# Patient Record
Sex: FEMALE | Race: BLACK OR AFRICAN AMERICAN | NOT HISPANIC OR LATINO | Employment: OTHER | URBAN - METROPOLITAN AREA
[De-identification: names, ages, dates, MRNs, and addresses within clinical notes are randomized per-mention and may not be internally consistent; named-entity substitution may affect disease eponyms.]

---

## 2019-01-02 ENCOUNTER — TRANSFERRED RECORDS (OUTPATIENT)
Dept: HEALTH INFORMATION MANAGEMENT | Facility: CLINIC | Age: 24
End: 2019-01-02

## 2019-01-02 LAB — INR PPP: 0.8 (ref 0.8–1.2)

## 2019-01-04 LAB
ALT SERPL-CCNC: 22 U/L (ref 6–55)
AST SERPL-CCNC: 48 U/L (ref 5–34)
CREAT SERPL-MCNC: 0.56 MG/DL (ref 0.57–1.11)
GFR SERPL CREATININE-BSD FRML MDRD: >60 ML/MIN/1.73M2
GLUCOSE SERPL-MCNC: 96 MG/DL (ref 65–99)
POTASSIUM SERPL-SCNC: 4.3 MMOL/L (ref 3.6–5.3)

## 2019-07-19 ENCOUNTER — OFFICE VISIT (OUTPATIENT)
Dept: URGENT CARE | Facility: URGENT CARE | Age: 24
End: 2019-07-19
Payer: MEDICAID

## 2019-07-19 VITALS
WEIGHT: 178 LBS | HEART RATE: 72 BPM | SYSTOLIC BLOOD PRESSURE: 90 MMHG | OXYGEN SATURATION: 99 % | RESPIRATION RATE: 16 BRPM | TEMPERATURE: 98.2 F | DIASTOLIC BLOOD PRESSURE: 68 MMHG

## 2019-07-19 DIAGNOSIS — M54.41 ACUTE RIGHT-SIDED LOW BACK PAIN WITH RIGHT-SIDED SCIATICA: Primary | ICD-10-CM

## 2019-07-19 DIAGNOSIS — R25.3 MUSCLE TWITCHING: ICD-10-CM

## 2019-07-19 PROCEDURE — 99214 OFFICE O/P EST MOD 30 MIN: CPT | Performed by: FAMILY MEDICINE

## 2019-07-19 NOTE — PROGRESS NOTES
Chief Complaint   Patient presents with     Urgent Care     right sided back pain     SUBJECTIVE  HPI: Karen Loya is a 24 year old female who presents for evaluation of back pain  Symptoms began 1 day(s) ago, have been onset acute and are worse.  Pain is located in the low back right region, with radiation to radiates into the right buttocks and radiates into the right leg,  Recent injury:recent heavy lifting, [place]  was there to help with the visit  Personal hx of back pain is no prior back problems.  Pain is exacerbated by: changing position.  Pain is relieved by: rest[unfilled] sx include: none.  Red flag symptoms: negative.  Does have some associated twitching mostly in the lateral 3 toes of the right foot.    History reviewed. No pertinent past medical history.  No current outpatient medications on file.     Social History     Tobacco Use     Smoking status: Never Smoker     Smokeless tobacco: Never Used   Substance Use Topics     Alcohol use: Not on file       ROS:  10 point ROS of systems including Constitutional, Eyes, Respiratory, Cardiovascular, Gastroenterology, Genitourinary, Integumentary,Psychiatric were all negative except for pertinent positives noted in my HPI         OBJECTIVE:  BP 90/68   Pulse 72   Temp 98.2  F (36.8  C) (Oral)   Resp 16   Wt 80.7 kg (178 lb)   SpO2 99%   Back examination: Back symmetric, no curvature. ROM normal. No CVA tenderness.  [unfilled] leg raise test: negative  GENERAL APPEARANCE: healthy, alert and no distress  RESP: lungs clear to auscultation - no rales, rhonchi or wheezes  CV: regular rates and rhythm, normal S1 S2, no murmur noted  ABDOMEN:  soft, nontender, no HSM or masses and bowel sounds normal  NEURO: Normal strength and tone with no weakness or sensory deficit noted, reflexes normal   SKIN: no suspicious lesions or rashes    ASSESSMENT/IMPRESSION:  Karen was seen today for urgent care.    Diagnoses and all orders for this visit:    Acute  right-sided low back pain with right-sided sciatica    Muscle twitching          PLAN:1) PLEASE SEE ORDER SUMMARY  [unfilled]:      1.  Continue stretching and strengthening exercises.       2.  Continue prn heat or ice application.  Viewed with patient about the condition advised that she should avoid heavy lifting  Do some back stretching exercises  Discussed about trying ibuprofen 600 mg with food every 8 hours for next 2 to 3 days and definitely consider doing back stretching exercises can use heat or cold packs to help with the back pain patient understood and agreed with plan  Muscle twitching I did discuss with patient if continues to worsen should consider following up with neurologist    Julia Ramirez MD

## 2019-07-19 NOTE — PATIENT INSTRUCTIONS
Patient Education     Back Exercises: Back Press    Do this exercise on your hands and knees. Keep your knees under your hips and your hands under your shoulders. Keep your spine in a neutral position (not arched or sagging). Be sure to maintain your neck s natural curve:    Tighten your stomach and buttock muscles to press your back upward. Let your head drop slightly.    Hold for 5 seconds. Return to starting position.    Repeat 5 times.  Date Last Reviewed: 3/1/2018    2773-6000 [employer]. [address] [place] [address]. All rights reserved. This information is not intended as a substitute for professional medical care. Always follow your healthcare professional's instructions.           Patient Education     Back Exercises: Back Release  Do this exercise on your hands and knees. Keep your knees under your hips and your hands under your shoulders.        Relax your abdominal and buttocks muscles, lift your head, and let your back sag. Be sure to keep your weight evenly distributed. Don t sit back on your hips.     Hold for 5 seconds.    Return to starting position.    Tuck your head and lift (arch) your back.    Hold for 5 seconds    Return to starting position.    Repeat 5 times.  Date Last Reviewed: 3/1/2018    7587-2360 [employer]. [address] [place] [address]. All rights reserved. This information is not intended as a substitute for professional medical care. Always follow your healthcare professional's instructions.           Patient Education     Back Exercises: Hip Rotator Stretch    To start, lie on your back with your knees bent and feet flat on the floor. Don t press your neck or lower back to the floor. Breathe deeply. You should feel comfortable and relaxed in this position.    Rest your right ankle on your left knee.    Place a towel behind your left thigh, and use it to pull the knee toward your chest. Feel the stretch in your  buttocks.    Hold for 30 to 60 seconds. Release.    Repeat 2 times.    Switch legs.     If there is any pain other than stretch in the knee or buttock, stop and contact your healthcare provider.  For your safety, check with your healthcare provider before starting an exercise program.  Date Last Reviewed: 3/1/2018    2164-8002 [employer]. [address] [place] [address]. All rights reserved. This information is not intended as a substitute for professional medical care. Always follow your healthcare professional's instructions.           Patient Education     Back Exercises: Leg Pull    To start, lie on your back with your knees bent and feet flat on the floor. Don t press your neck or lower back to the floor. Breathe deeply. You should feel comfortable and relaxed in this position.    Pull one knee to your chest.    Hold for 30 to 60 seconds. Return to starting position.    Repeat 2 times.    Switch legs.    For a double leg pull, pull both legs to your chest at the same time. Repeat 2 times.  For your safety, check with your healthcare provider before starting an exercise program.  Date Last Reviewed: 3/1/2018    7638-6130 [employer]. [address] [place] [address]. All rights reserved. This information is not intended as a substitute for professional medical care. Always follow your healthcare professional's instructions.

## 2019-08-14 ENCOUNTER — TELEPHONE (OUTPATIENT)
Dept: OBGYN | Facility: CLINIC | Age: 24
End: 2019-08-14

## 2019-08-14 NOTE — TELEPHONE ENCOUNTER
"Call received from  [phone]  LMP 6/15/19, has not taken a pregnancy test. When asked if she could be pregnant?  \"could be\"??  Started having some bleeding 8/9.  Had difficulty getting any information from pt or   Recommend pt to to ER for evaluation.  Dilma Silva RN      "

## 2019-10-31 ENCOUNTER — APPOINTMENT (OUTPATIENT)
Dept: CT IMAGING | Facility: CLINIC | Age: 24
End: 2019-10-31
Attending: NURSE PRACTITIONER
Payer: COMMERCIAL

## 2019-10-31 ENCOUNTER — HOSPITAL ENCOUNTER (EMERGENCY)
Facility: CLINIC | Age: 24
Discharge: HOME | End: 2019-10-31
Attending: EMERGENCY MEDICINE | Admitting: EMERGENCY MEDICINE
Payer: COMMERCIAL

## 2019-10-31 VITALS
SYSTOLIC BLOOD PRESSURE: 108 MMHG | RESPIRATION RATE: 18 BRPM | OXYGEN SATURATION: 100 % | TEMPERATURE: 98.4 F | HEART RATE: 88 BPM | DIASTOLIC BLOOD PRESSURE: 88 MMHG

## 2019-10-31 DIAGNOSIS — K43.9 ABDOMINAL WALL HERNIA: ICD-10-CM

## 2019-10-31 DIAGNOSIS — K59.00 CONSTIPATION: ICD-10-CM

## 2019-10-31 LAB
ALBUMIN UR-MCNC: NEGATIVE MG/DL
ANION GAP SERPL CALCULATED.3IONS-SCNC: 3 MMOL/L (ref 3–14)
APPEARANCE UR: CLEAR
B-HCG FREE SERPL-ACNC: <5 IU/L
BASOPHILS # BLD AUTO: 0 10E9/L (ref 0–0.2)
BASOPHILS NFR BLD AUTO: 0.3 %
BILIRUB UR QL STRIP: NEGATIVE
BUN SERPL-MCNC: 10 MG/DL (ref 7–30)
CALCIUM SERPL-MCNC: 9 MG/DL (ref 8.5–10.1)
CHLORIDE SERPL-SCNC: 108 MMOL/L (ref 94–109)
CO2 SERPL-SCNC: 26 MMOL/L (ref 20–32)
COLOR UR AUTO: ABNORMAL
CREAT SERPL-MCNC: 0.37 MG/DL (ref 0.52–1.04)
DIFFERENTIAL METHOD BLD: ABNORMAL
EOSINOPHIL # BLD AUTO: 0.2 10E9/L (ref 0–0.7)
EOSINOPHIL NFR BLD AUTO: 3.1 %
ERYTHROCYTE [DISTWIDTH] IN BLOOD BY AUTOMATED COUNT: 20.2 % (ref 10–15)
GFR SERPL CREATININE-BSD FRML MDRD: >90 ML/MIN/{1.73_M2}
GLUCOSE SERPL-MCNC: 102 MG/DL (ref 70–99)
GLUCOSE UR STRIP-MCNC: NEGATIVE MG/DL
HCT VFR BLD AUTO: 31.5 % (ref 35–47)
HGB BLD-MCNC: 10.1 G/DL (ref 11.7–15.7)
HGB UR QL STRIP: ABNORMAL
IMM GRANULOCYTES # BLD: 0 10E9/L (ref 0–0.4)
IMM GRANULOCYTES NFR BLD: 0.2 %
KETONES UR STRIP-MCNC: NEGATIVE MG/DL
LEUKOCYTE ESTERASE UR QL STRIP: NEGATIVE
LYMPHOCYTES # BLD AUTO: 1.3 10E9/L (ref 0.8–5.3)
LYMPHOCYTES NFR BLD AUTO: 21.9 %
MCH RBC QN AUTO: 23.1 PG (ref 26.5–33)
MCHC RBC AUTO-ENTMCNC: 32.1 G/DL (ref 31.5–36.5)
MCV RBC AUTO: 72 FL (ref 78–100)
MONOCYTES # BLD AUTO: 0.7 10E9/L (ref 0–1.3)
MONOCYTES NFR BLD AUTO: 11.8 %
NEUTROPHILS # BLD AUTO: 3.7 10E9/L (ref 1.6–8.3)
NEUTROPHILS NFR BLD AUTO: 62.7 %
NITRATE UR QL: NEGATIVE
NRBC # BLD AUTO: 0 10*3/UL
NRBC BLD AUTO-RTO: 0 /100
PH UR STRIP: 7 PH (ref 5–7)
PLATELET # BLD AUTO: 374 10E9/L (ref 150–450)
POTASSIUM SERPL-SCNC: 4.1 MMOL/L (ref 3.4–5.3)
RBC # BLD AUTO: 4.37 10E12/L (ref 3.8–5.2)
RBC #/AREA URNS AUTO: 1 /HPF (ref 0–2)
SODIUM SERPL-SCNC: 137 MMOL/L (ref 133–144)
SOURCE: ABNORMAL
SP GR UR STRIP: 1 (ref 1–1.03)
SQUAMOUS #/AREA URNS AUTO: <1 /HPF (ref 0–1)
UROBILINOGEN UR STRIP-MCNC: NORMAL MG/DL (ref 0–2)
WBC # BLD AUTO: 5.8 10E9/L (ref 4–11)
WBC #/AREA URNS AUTO: 1 /HPF (ref 0–5)

## 2019-10-31 PROCEDURE — 99285 EMERGENCY DEPT VISIT HI MDM: CPT | Mod: 25

## 2019-10-31 PROCEDURE — 96374 THER/PROPH/DIAG INJ IV PUSH: CPT

## 2019-10-31 PROCEDURE — 81001 URINALYSIS AUTO W/SCOPE: CPT | Performed by: NURSE PRACTITIONER

## 2019-10-31 PROCEDURE — 80048 BASIC METABOLIC PNL TOTAL CA: CPT | Performed by: EMERGENCY MEDICINE

## 2019-10-31 PROCEDURE — 84702 CHORIONIC GONADOTROPIN TEST: CPT

## 2019-10-31 PROCEDURE — 85025 COMPLETE CBC W/AUTO DIFF WBC: CPT | Performed by: NURSE PRACTITIONER

## 2019-10-31 PROCEDURE — 74176 CT ABD & PELVIS W/O CONTRAST: CPT

## 2019-10-31 PROCEDURE — 25000128 H RX IP 250 OP 636: Performed by: NURSE PRACTITIONER

## 2019-10-31 RX ORDER — KETOROLAC TROMETHAMINE 15 MG/ML
15 INJECTION, SOLUTION INTRAMUSCULAR; INTRAVENOUS ONCE
Status: COMPLETED | OUTPATIENT
Start: 2019-10-31 | End: 2019-10-31

## 2019-10-31 RX ADMIN — KETOROLAC TROMETHAMINE 15 MG: 15 INJECTION, SOLUTION INTRAMUSCULAR; INTRAVENOUS at 11:37

## 2019-10-31 SDOH — HEALTH STABILITY: MENTAL HEALTH: HOW OFTEN DO YOU HAVE A DRINK CONTAINING ALCOHOL?: NEVER

## 2019-10-31 ASSESSMENT — ENCOUNTER SYMPTOMS
FEVER: 0
ABDOMINAL PAIN: 1

## 2019-10-31 NOTE — ED NOTES
Bed: ED23  Expected date:   Expected time:   Means of arrival:   Comments:  beau - 514 - 24 F vag bleeding eta 1024

## 2019-10-31 NOTE — ED TRIAGE NOTES
Pt reports that she is having heavy vaginal bleeding for past two days. Reports also having back pain. Last menstruation one month ago.

## 2019-10-31 NOTE — ED AVS SNAPSHOT
Emergency Department  [address] [place] [id number]  Phone:  [phone]  Fax:  [phone]                                    Karen Loya   MRN: [id number]    Department:   Emergency Department   Date of Visit:  10/31/2019           After Visit Summary Signature Page    I have received my discharge instructions, and my questions have been answered. I have discussed any challenges I see with this plan with the nurse or doctor.    ..........................................................................................................................................  Patient/Patient Representative Signature      ..........................................................................................................................................  Patient Representative Print Name and Relationship to Patient    ..................................................               ................................................  Date                                   Time    ..........................................................................................................................................  Reviewed by Signature/Title    ...................................................              ..............................................  Date                                               Time          22EPIC Rev 08/18

## 2019-10-31 NOTE — ED PROVIDER NOTES
History     Chief Complaint:  Vaginal Bleeding    HPI   Karen Loya is a 24 year old female who presents with vaginal bleeding and right-sided abdominal pain. She states she has had right-sided abdominal pain since she gave birth in January 2019 which is worse when she lies on her side and bends over.  The patient reports on 10/29 she started to have vaginal bleeding and her abdominal pain has increased. She notes her last period was about a month ago, but states that this period feels different from her normal period as it is heavier than normal with more clots. She states she has not taken medication since she wanted to know what was happening with her pain. She denies history of kidney stones.    Allergies:  No Known Drug Allergies     Medications:    The patient is currently on no regular medications.     Past Medical History:    Preeclampsia in pregnancy     Past Surgical History:    History reviewed. No pertinent past surgical history.     Family History:    History reviewed. No pertinent family history.      Social History:  Recently moved from [place], [place] and went to [place]  Smoking Status: Never  Smokeless Tobacco: Never  Marital Status:       Review of Systems   Constitutional: Negative for chills and fever.   Respiratory: Negative for shortness of breath.    Cardiovascular: Negative for chest pain.   Gastrointestinal: Positive for abdominal pain. Negative for nausea and vomiting.   Genitourinary: Positive for vaginal bleeding.   Musculoskeletal: Negative for back pain.   All other systems reviewed and are negative.        Physical Exam     Patient Vitals for the past 24 hrs:   BP Temp Temp src Pulse Resp SpO2   10/31/19 1035 -- -- -- -- -- 100 %   10/31/19 1034 119/57 98.4  F (36.9  C) Oral 89 20 100 %        Physical Exam  General: Alert, Mild  discomfort, well kept  Eyes: PERRL, conjunctivae pink no scleral icterus or conjunctival injection  ENT:   Moist mucus membranes, posterior  oropharynx clear without erythema or exudates, No lymphadenopathy, Normal voice  Resp:  Lungs clear to auscultation bilaterally, no crackles/rubs/wheezes. Good air movement  CV:  Normal rate and rhythm, no murmurs/rubs/gallops  GI:  Abdomen soft and non-distended.  Normoactive BS. Mild LLQ/Left abd tenderness, No guarding or rebound, No masses  Skin:  Warm, dry.  No rashes or petechiae  Musculoskeletal: No peripheral edema or calf tenderness, Normal gross ROM   Neuro: Alert and oriented to person/place/time, normal sensation  Psychiatric: Normal affect, cooperative, good eye contact    Emergency Department Course   Imaging:  Radiology findings were communicated with the patient who voiced understanding of the findings.    Abd/pelvis CT no contrast - Stone Protocol  1. Calcifications and/or nonobstructing stones in the left kidney.  2. Large amount of stool in the distal colon.  3. Anterior abdominal wall hernia as above.  As read by Radiology.    Laboratory:  Laboratory findings were communicated with the patient who voiced understanding of the findings.    CBC: HGB 10.1 (L) o/w WNL. (WBC 5.8, )   BMP: Glucose 102 (H), Creatinine 0.37 (L) o/w WNL  ISTAT HCG Sergei: <5.0  UA: Moderate blood, o/w Negative    Interventions:  1137: Toradol 15 mg IV    Emergency Department Course:  Past medical records, nursing notes, and vitals reviewed.  1112: I performed an exam of the patient and obtained history, as documented above.  IV inserted and blood drawn.  The patient provided a urine sample here in the emergency department. This was sent for laboratory testing, findings above.    1422: I rechecked the patient. Explained findings to the patient. She was concerned about her urine and would like to try to give a urine sample.    Findings and plan explained to the Patient. Patient discharged home with instructions regarding supportive care, medications, and reasons to return. The importance of close follow-up was reviewed.      Impression & Plan   Medical Decision Making:  Karen Loya presents with abdominal pain.  The differential diagnosis is broad and includes:  Appendicitis, cholecystitis, peptic ulcer disease, diverticulitis, bowel obstruction, ischemia, pancreatitis, amongst others.  Based on clinical exam, laboratory testing, and imaging, exam is most consistent with abdominal wall hernia not incarcerated or strangulated and  constipation.  No evidence for further intra-abdominal catastrophe..  The pain has improved with interventions in the ED.  The exact etiology of the pain is not clear at this time.  She will be discharged, and was warned that persistent or worsening symptoms should prompt re-examination (ED if necessary) in 8-12 hours.    Discharge Diagnosis:    ICD-10-CM    1. Abdominal wall hernia K43.9 UA with Microscopic reflex to Culture   2. Constipation K59.00      Disposition:  discharged to home     Scribe Disclosure:  Gelacio LONG, am serving as a scribe at 11:12 AM on 10/31/2019 to document services personally performed by Pj Partida APRN CNP based on my observations and the provider's statements to me.       10/31/2019    EMERGENCY DEPARTMENT       Pj Partida APRN CNP  11/12/19 1016

## 2019-11-05 ENCOUNTER — OFFICE VISIT (OUTPATIENT)
Dept: URGENT CARE | Facility: URGENT CARE | Age: 24
End: 2019-11-05
Payer: COMMERCIAL

## 2019-11-05 VITALS
WEIGHT: 178 LBS | DIASTOLIC BLOOD PRESSURE: 67 MMHG | HEART RATE: 94 BPM | SYSTOLIC BLOOD PRESSURE: 115 MMHG | OXYGEN SATURATION: 98 % | TEMPERATURE: 99 F | RESPIRATION RATE: 16 BRPM

## 2019-11-05 DIAGNOSIS — R10.9 ABDOMINAL WALL PAIN: Primary | ICD-10-CM

## 2019-11-05 PROCEDURE — 99213 OFFICE O/P EST LOW 20 MIN: CPT | Performed by: PHYSICIAN ASSISTANT

## 2019-11-05 RX ORDER — IBUPROFEN 800 MG/1
800 TABLET, FILM COATED ORAL EVERY 8 HOURS PRN
Qty: 100 TABLET | Refills: 0 | Status: SHIPPED | OUTPATIENT
Start: 2019-11-05 | End: 2020-08-12

## 2019-11-05 NOTE — PATIENT INSTRUCTIONS
(R10.9) Abdominal wall pain  (primary encounter diagnosis)  Comment:   Plan: ibuprofen (ADVIL/MOTRIN) 800 MG tablet    Establish care with primary clinic.  Appointment made for patient.

## 2019-11-05 NOTE — PROGRESS NOTES
Patient presents with:  Abnormal Uterine Bleeding: Pt states she is having bleeding and abdominal bloating     SUBJECTIVE:   Karen Loya is a 24 year old female presenting with a chief complaint of   1) pain in her abdomen since she was pregnant.  Her son is now 10 months old    She was seen in the ED on 10/31/19 and diagnosed with an abdominal wall hernia and constipation.    Her symptoms are the same.      She does not have a primary clinic.     LMP 10/30/19.    SH: patient is here with her 10 month old son today     used today.    No past medical history on file.     Abdominal wall hernia    There is no problem list on file for this patient.    Social History     Tobacco Use     Smoking status: Never Smoker     Smokeless tobacco: Never Used   Substance Use Topics     Alcohol use: Never     Frequency: Never       ROS:  CONSTITUTIONAL:NEGATIVE for fever, chills, change in weight  INTEGUMENTARY/SKIN: NEGATIVE for worrisome rashes, moles or lesions  EYES: NEGATIVE for vision changes or irritation  ENT/MOUTH: NEGATIVE for ear, mouth and throat problems  RESP:NEGATIVE for significant cough or SOB  CV: NEGATIVE for chest pain, palpitations or peripheral edema  GI: NEGATIVE for nausea, abdominal pain, heartburn, or change in bowel habits  : normal menstrual cycles  MUSCULOSKELETAL: as per HPI  NEURO: NEGATIVE for weakness, dizziness or paresthesias  PSYCHIATRIC: NEGATIVE for changes in mood or affect    OBJECTIVE  :/67 (BP Location: Left arm, Patient Position: Sitting, Cuff Size: Adult Regular)   Pulse 94   Temp 99  F (37.2  C) (Oral)   Resp 16   Wt 80.7 kg (178 lb)   SpO2 98%   GENERAL APPEARANCE: healthy, alert and no distress  EYES: EOMI,  PERRL, conjunctiva clear  HENT: ear canals and TM's normal.  Nose and mouth without ulcers, erythema or lesions  NECK: supple, nontender, no lymphadenopathy  RESP: lungs clear to auscultation - no rales, rhonchi or wheezes  CV: regular rates and rhythm,  normal S1 S2, no murmur noted  ABDOMEN: soft.  Tenderness around umbilicus.    NEURO: Normal strength and tone, sensory exam grossly normal,  normal speech and mentation  SKIN: no suspicious lesions or rashes    (R10.9) Abdominal wall pain  (primary encounter diagnosis)  Comment: in patient recently seen for same chronic symptoms at ED on 10/31/19 and diagnosed with abdominal wall hernia  Plan: ibuprofen (ADVIL/MOTRIN) 800 MG tablet        appoitnment made for patient to establish care with primary clinic    Patient expresses understanding and agreement with the assessment and plan as above.

## 2019-11-12 ASSESSMENT — ENCOUNTER SYMPTOMS
BACK PAIN: 0
SHORTNESS OF BREATH: 0
VOMITING: 0
NAUSEA: 0
CHILLS: 0

## 2020-01-21 ENCOUNTER — OFFICE VISIT (OUTPATIENT)
Dept: URGENT CARE | Facility: URGENT CARE | Age: 25
End: 2020-01-21
Payer: COMMERCIAL

## 2020-01-21 VITALS
SYSTOLIC BLOOD PRESSURE: 113 MMHG | OXYGEN SATURATION: 99 % | DIASTOLIC BLOOD PRESSURE: 74 MMHG | HEART RATE: 83 BPM | TEMPERATURE: 99.5 F | RESPIRATION RATE: 17 BRPM

## 2020-01-21 DIAGNOSIS — N39.0 URINARY TRACT INFECTION WITH HEMATURIA, SITE UNSPECIFIED: ICD-10-CM

## 2020-01-21 DIAGNOSIS — R31.9 URINARY TRACT INFECTION WITH HEMATURIA, SITE UNSPECIFIED: ICD-10-CM

## 2020-01-21 DIAGNOSIS — R42 DIZZINESS: Primary | ICD-10-CM

## 2020-01-21 LAB
ALBUMIN UR-MCNC: NEGATIVE MG/DL
ANION GAP SERPL CALCULATED.3IONS-SCNC: 2 MMOL/L (ref 3–14)
APPEARANCE UR: CLEAR
BASOPHILS # BLD AUTO: 0 10E9/L (ref 0–0.2)
BASOPHILS NFR BLD AUTO: 0.4 %
BILIRUB UR QL STRIP: NEGATIVE
BUN SERPL-MCNC: 8 MG/DL (ref 7–30)
CALCIUM SERPL-MCNC: 8.9 MG/DL (ref 8.5–10.1)
CHLORIDE SERPL-SCNC: 106 MMOL/L (ref 94–109)
CO2 SERPL-SCNC: 29 MMOL/L (ref 20–32)
COLOR UR AUTO: YELLOW
CREAT SERPL-MCNC: 0.41 MG/DL (ref 0.52–1.04)
DIFFERENTIAL METHOD BLD: ABNORMAL
EOSINOPHIL # BLD AUTO: 0.1 10E9/L (ref 0–0.7)
EOSINOPHIL NFR BLD AUTO: 2.7 %
ERYTHROCYTE [DISTWIDTH] IN BLOOD BY AUTOMATED COUNT: 21.5 % (ref 10–15)
GFR SERPL CREATININE-BSD FRML MDRD: >90 ML/MIN/{1.73_M2}
GLUCOSE SERPL-MCNC: 99 MG/DL (ref 70–99)
GLUCOSE UR STRIP-MCNC: NEGATIVE MG/DL
HCG UR QL: NEGATIVE
HCT VFR BLD AUTO: 33.6 % (ref 35–47)
HGB BLD-MCNC: 10.6 G/DL (ref 11.7–15.7)
HGB UR QL STRIP: ABNORMAL
KETONES UR STRIP-MCNC: NEGATIVE MG/DL
LEUKOCYTE ESTERASE UR QL STRIP: ABNORMAL
LYMPHOCYTES # BLD AUTO: 1.9 10E9/L (ref 0.8–5.3)
LYMPHOCYTES NFR BLD AUTO: 36.3 %
MCH RBC QN AUTO: 23 PG (ref 26.5–33)
MCHC RBC AUTO-ENTMCNC: 31.5 G/DL (ref 31.5–36.5)
MCV RBC AUTO: 73 FL (ref 78–100)
MONOCYTES # BLD AUTO: 0.8 10E9/L (ref 0–1.3)
MONOCYTES NFR BLD AUTO: 14.4 %
NEUTROPHILS # BLD AUTO: 2.4 10E9/L (ref 1.6–8.3)
NEUTROPHILS NFR BLD AUTO: 46.2 %
NITRATE UR QL: NEGATIVE
NON-SQ EPI CELLS #/AREA URNS LPF: ABNORMAL /LPF
PH UR STRIP: 5.5 PH (ref 5–7)
PLATELET # BLD AUTO: 332 10E9/L (ref 150–450)
POTASSIUM SERPL-SCNC: 3.6 MMOL/L (ref 3.4–5.3)
RBC # BLD AUTO: 4.61 10E12/L (ref 3.8–5.2)
RBC #/AREA URNS AUTO: ABNORMAL /HPF
SODIUM SERPL-SCNC: 137 MMOL/L (ref 133–144)
SOURCE: ABNORMAL
SP GR UR STRIP: 1.01 (ref 1–1.03)
TSH SERPL DL<=0.005 MIU/L-ACNC: 2.8 MU/L (ref 0.4–4)
UROBILINOGEN UR STRIP-ACNC: 0.2 EU/DL (ref 0.2–1)
WBC # BLD AUTO: 5.2 10E9/L (ref 4–11)
WBC #/AREA URNS AUTO: ABNORMAL /HPF

## 2020-01-21 PROCEDURE — 81001 URINALYSIS AUTO W/SCOPE: CPT | Performed by: FAMILY MEDICINE

## 2020-01-21 PROCEDURE — 36415 COLL VENOUS BLD VENIPUNCTURE: CPT | Performed by: FAMILY MEDICINE

## 2020-01-21 PROCEDURE — 87086 URINE CULTURE/COLONY COUNT: CPT | Performed by: FAMILY MEDICINE

## 2020-01-21 PROCEDURE — 99214 OFFICE O/P EST MOD 30 MIN: CPT | Performed by: FAMILY MEDICINE

## 2020-01-21 PROCEDURE — 80048 BASIC METABOLIC PNL TOTAL CA: CPT | Performed by: FAMILY MEDICINE

## 2020-01-21 PROCEDURE — 81025 URINE PREGNANCY TEST: CPT | Performed by: FAMILY MEDICINE

## 2020-01-21 PROCEDURE — 85025 COMPLETE CBC W/AUTO DIFF WBC: CPT | Performed by: FAMILY MEDICINE

## 2020-01-21 PROCEDURE — 84443 ASSAY THYROID STIM HORMONE: CPT | Performed by: FAMILY MEDICINE

## 2020-01-21 RX ORDER — NITROFURANTOIN 25; 75 MG/1; MG/1
100 CAPSULE ORAL 2 TIMES DAILY
Qty: 10 CAPSULE | Refills: 0 | Status: SHIPPED | OUTPATIENT
Start: 2020-01-21 | End: 2020-01-26

## 2020-01-21 NOTE — PROGRESS NOTES
SUBJECTIVE: Karen Loya is a 25 year old female presenting with a chief complaint of wanting blood tests for not feeling well.  Onset of symptoms was day(s) ago.  Course of illness is worsening.    Severity moderate  Current and Associated symptoms: dizziness  Treatment measures tried include None tried.  Predisposing factors include None.    No past medical history on file.  No Known Allergies  Social History     Tobacco Use     Smoking status: Never Smoker     Smokeless tobacco: Never Used   Substance Use Topics     Alcohol use: Never     Frequency: Never       ROS:  SKIN: no rash  GI: no vomiting    OBJECTIVE:  /74   Pulse 83   Temp 99.5  F (37.5  C) (Oral)   Resp 17   SpO2 99% GENERAL APPEARANCE: healthy, alert and no distress  EYES: EOMI,  PERRL, conjunctiva clear  HENT: ear canals and TM's normal.  Nose and mouth without ulcers, erythema or lesions  NECK: supple, nontender, no lymphadenopathy  RESP: lungs clear to auscultation - no rales, rhonchi or wheezes  CV: regular rates and rhythm, normal S1 S2, no murmur noted  ABDOMEN:  soft, nontender, no HSM or masses and bowel sounds normal  NEURO: Normal strength and tone, sensory exam grossly normal,  normal speech and mentation  SKIN: no suspicious lesions or rashes      ICD-10-CM    1. Dizziness R42 CBC with platelets differential     HCG qualitative urine     UA with Microscopic reflex to Culture     Basic metabolic panel     TSH with free T4 reflex     Urine Culture Aerobic Bacterial   2. Urinary tract infection with hematuria, site unspecified N39.0 nitroFURantoin macrocrystal-monohydrate (MACROBID) 100 MG capsule    R31.9        Fluids/Rest, f/u if worse/not any better

## 2020-01-22 LAB
BACTERIA SPEC CULT: NORMAL
SPECIMEN SOURCE: NORMAL

## 2020-08-12 ENCOUNTER — OFFICE VISIT (OUTPATIENT)
Dept: INTERNAL MEDICINE | Facility: CLINIC | Age: 25
End: 2020-08-12
Payer: COMMERCIAL

## 2020-08-12 ENCOUNTER — TELEPHONE (OUTPATIENT)
Dept: INTERNAL MEDICINE | Facility: CLINIC | Age: 25
End: 2020-08-12

## 2020-08-12 VITALS
BODY MASS INDEX: 47.54 KG/M2 | RESPIRATION RATE: 18 BRPM | OXYGEN SATURATION: 98 % | DIASTOLIC BLOOD PRESSURE: 66 MMHG | HEART RATE: 87 BPM | SYSTOLIC BLOOD PRESSURE: 104 MMHG | WEIGHT: 205.4 LBS | HEIGHT: 55 IN

## 2020-08-12 DIAGNOSIS — R50.9 FEVER AND CHILLS: Primary | ICD-10-CM

## 2020-08-12 DIAGNOSIS — Z32.01 PREGNANCY TEST POSITIVE: ICD-10-CM

## 2020-08-12 DIAGNOSIS — Z34.90 SUPERVISION OF NORMAL PREGNANCY: ICD-10-CM

## 2020-08-12 DIAGNOSIS — Z20.822 SUSPECTED COVID-19 VIRUS INFECTION: ICD-10-CM

## 2020-08-12 DIAGNOSIS — N92.6 MISSED PERIOD: ICD-10-CM

## 2020-08-12 DIAGNOSIS — O21.9 NAUSEA AND VOMITING IN PREGNANCY: Primary | ICD-10-CM

## 2020-08-12 LAB — B-HCG SERPL-ACNC: ABNORMAL IU/L (ref 0–5)

## 2020-08-12 PROCEDURE — 84702 CHORIONIC GONADOTROPIN TEST: CPT | Performed by: PHYSICIAN ASSISTANT

## 2020-08-12 PROCEDURE — 99213 OFFICE O/P EST LOW 20 MIN: CPT | Performed by: PHYSICIAN ASSISTANT

## 2020-08-12 PROCEDURE — 36415 COLL VENOUS BLD VENIPUNCTURE: CPT | Performed by: PHYSICIAN ASSISTANT

## 2020-08-12 ASSESSMENT — MIFFLIN-ST. JEOR: SCORE: 1510.88

## 2020-08-12 NOTE — TELEPHONE ENCOUNTER
Please call patient with HCG results. Pregnancy test is positive. She should get into see OBGYN. Referral placed    Your provider has referred you to:  [facility]: [facility] [phone]   [email or web address]    I am wondering if we can reach out to OB team she will be seeing for advice. Her HCG seems higher than her LMP (July 10). Also patient hasn't eaten since yesterday due to vomiting. She is keeping liquids down.  I advised her to eat small frequent meals and hydrate. I am wondering if OB has a recommendation on medication to try for vomiting until she can get in and be seen by them.     I will also send in prenatal vitamin. Please ensure patient has no further pregnancy education questions and review general recommendations      - no alcohol or tobacco use    - daily prenatal   - avoid soft cheeses, raw fish and sandwich meat   - call before trying an OTC medications or ask pharmacist

## 2020-08-12 NOTE — PROGRESS NOTES
"   SUBJECTIVE:   CC: Karen Loya is an 25 year old woman who presents for multiple concerns as below.   Patient was initially here for a physical but had multiple concerns thus we change this to an office visit.    Patient notes she missed her period. She is concerned for possible pregnancy.     Patient reports loss of appetite and notes she does not like the smell of food   -She reports vomiting when trying to eat anything  -Patient reports she hasn't eaten today  -She is drinking fluids, and has nausea with water, but does not throw it up  -She reports she had this with last pregnancy   -Patient notes she did medication last time   -Patient reports she feels dizzy, room spinning   -Dizziness comes and goes and lasts for few seconds  -Symptom onset- July 23 rd    -Patient also reports fever and chills she has had this for couple of days only when going to sleep  -She has not taken temperature   -She denies cough, sore throat, diarrhea, loss of smell or taste, headache, runny nose, body aches   -She feels warm and cold for few hours then goes away   - LMP was 07/10/20          Reviewed and updated as needed this visit by clinical staff  Tobacco  Allergies  Meds  Problems  Surg Hx  Fam Hx  Soc Hx        Reviewed and updated as needed this visit by Provider  Tobacco  Meds  Problems  Surg Hx  Fam Hx  Soc Hx         OBJECTIVE:   /66   Pulse 87   Resp 18   Ht 1.384 m (4' 6.5\")   Wt 93.2 kg (205 lb 6.4 oz)   LMP 07/10/2020 (Approximate)   SpO2 98%   BMI 48.62 kg/m    Physical Exam  GENERAL: healthy, alert and no distress  EYES: Eyes grossly normal to inspection, PERRL and conjunctivae and sclerae normal  HENT: ear canals and TM's normal, nose and mouth without ulcers or lesions  NECK: no adenopathy, no asymmetry, masses, or scars and thyroid normal to palpation  RESP: lungs clear to auscultation - no rales, rhonchi or wheezes  CV: regular rates and rhythm, normal S1 S2, no S3 or S4 and no murmur, " click or rub  ABDOMEN: soft, nontender, no hepatosplenomegaly, no masses and bowel sounds normal    Diagnostic Test Results:  Labs reviewed in Epic    ASSESSMENT/PLAN:   1. Fever and chills  2. Suspected COVID-19 virus infection  -Discussed with patient due to reported fever and chills recommend being screened for COVID-19  -When CMA goes into the room to take COVID-19 test patient declines, CMA reviews that this is needed and she continues to decline  -She has been advised on isolation precautions    3. Missed period  - HCG Quantitative Pregnancy, Blood ([id number])    4. Pregnancy test positive  -hCG quant returned higher than expected based on last menstrual period   -Reached out to OB/GYN team due to significant vomiting and they have advised on medication see telephone encounter  - pt to establish with OBGYN and needs to start prenatals   - OB/GYN REFERRAL      Yahaira Lopez PA-C  [facility]

## 2020-08-12 NOTE — NURSING NOTE
Spoke with patient explaining that because of her symptoms she should be tested. Patient declined COVID testing. I did instruct patient that she should quarantine for the next 14 days to be safe. Patient gave verbal understanding through phone .

## 2020-08-13 ENCOUNTER — APPOINTMENT (OUTPATIENT)
Dept: INTERPRETER SERVICES | Facility: CLINIC | Age: 25
End: 2020-08-13
Payer: COMMERCIAL

## 2020-08-13 RX ORDER — PYRIDOXINE HCL (VITAMIN B6) 25 MG
25 TABLET ORAL 3 TIMES DAILY PRN
Qty: 100 TABLET | Refills: 3 | Status: SHIPPED | OUTPATIENT
Start: 2020-08-13 | End: 2020-12-09

## 2020-08-13 NOTE — TELEPHONE ENCOUNTER
Noted. No further recommendations, just recommend that she establish with OB and if not comfortable with a male we could send her to another OB location ?

## 2020-08-13 NOTE — TELEPHONE ENCOUNTER
Patient notified of results . Discussed to call her back with rest of information at 9 am .Sydney Villa RN

## 2020-08-13 NOTE — TELEPHONE ENCOUNTER
Sent in vit b6 and unisom as there is  and it may be hard to explain getting this OTC.    Scheduled for dating U/s.      Pt unsure if she will come to see male doc at [facility] or if she may seek care elsewhere.  We will still proceed with U/s for dating as HCG high enough and according to pt her LMP was 7/10/20.    Dr Alves, I ordered under you as she may continue care here.    Chasity CORTES R.N.

## 2020-08-14 ENCOUNTER — TELEPHONE (OUTPATIENT)
Dept: OBGYN | Facility: CLINIC | Age: 25
End: 2020-08-14

## 2020-08-14 NOTE — TELEPHONE ENCOUNTER
Tried to call pt, unable to LM.  Pt was on U/s schedule today at [facility].    Had to cancel as she had COVID sx yesterday. Will need to reschedule.    Chasity CORTES R.N.

## 2020-08-19 RX ORDER — PRENATAL VIT/IRON FUM/FOLIC AC 27MG-0.8MG
1 TABLET ORAL DAILY
Qty: 90 TABLET | Refills: 3 | Status: SHIPPED | OUTPATIENT
Start: 2020-08-19 | End: 2020-09-16

## 2020-08-27 ENCOUNTER — APPOINTMENT (OUTPATIENT)
Dept: INTERPRETER SERVICES | Facility: CLINIC | Age: 25
End: 2020-08-27
Payer: COMMERCIAL

## 2020-08-28 DIAGNOSIS — Z34.90 SUPERVISION OF NORMAL PREGNANCY: ICD-10-CM

## 2020-08-29 NOTE — RESULT ENCOUNTER NOTE
Please advise the patient of the normal early first trimester obstetrical ultrasound confirming viability.  Please change the EDC of this gestation to 4/6/2021 based on the 7-week ultrasound exam  Thank you  Tiago Alves MD FACOG

## 2020-09-16 ENCOUNTER — PRENATAL OFFICE VISIT (OUTPATIENT)
Dept: NURSING | Facility: CLINIC | Age: 25
End: 2020-09-16
Payer: COMMERCIAL

## 2020-09-16 DIAGNOSIS — Z34.80 PRENATAL CARE, SUBSEQUENT PREGNANCY, UNSPECIFIED TRIMESTER: Primary | ICD-10-CM

## 2020-09-16 DIAGNOSIS — Z32.01 PREGNANCY TEST POSITIVE: ICD-10-CM

## 2020-09-16 PROCEDURE — 99207 ZZC NO CHARGE NURSE ONLY: CPT

## 2020-09-16 RX ORDER — PRENATAL VIT/IRON FUM/FOLIC AC 27MG-0.8MG
1 TABLET ORAL DAILY
Qty: 90 TABLET | Refills: 3 | Status: SHIPPED | OUTPATIENT
Start: 2020-09-16 | End: 2020-12-09

## 2020-09-16 NOTE — PROGRESS NOTES
NPN nurse visit done over the phone. Pt will be given NPN folder and book at her upcoming appt.   Discussed optional screening available to assess chromosomal anomalies. Questions answered. Pt advised to call the clinic if she has any questions or concerns related to her pregnancy. Prenatal labs will be obtained at her upcoming appt. New prenatal visit scheduled on 9/23 with Maile Eliza HALINA.    11w1d    No results found for: PAP        Patient supplied answers from flow sheet for:  Prenatal OB Questionnaire.  Past Medical History  Diabetes?: No  Hypertension : No  Heart disease, mitral valve prolapse or rheumatic fever?: No  An autoimmune disease such as lupus or rheumatoid arthritis?: No  Kidney disease or urinary tract infection?: No  Epilepsy, seizures or spells?: No  Migraine headaches?: No  A stroke or loss of function or sensation?: No  Any other neurological problems?: No  Have you ever been treated for depression?: No  Are you having problems with crying spells or loss of self-esteem?: No  Have you ever required psychiatric care?: No  Have you ever had hepatitis, liver disease or jaundice?: No  Have you been treated for blood clots in your veins, deep vein thromosis, inflammation in the veins, thrombosis, phlebitis, pulmonary embolism or varicosities?: No  Have you had excessive bleeding after surgery or dental work?: No  Do you bleed more than other women after a cut or scratch?: No  Do you have a history of anemia?: No  Have you ever had thyroid problems or taken thyroid medication?: No   Do you have any endocrine problems?: No  Have you ever been in a major accident or suffered serious trauma?: No  Within the last year, has anyone hit, slapped, kicked or otherwise hurt you?: No  In the last year, has anyone forced you to have sex when you didn't want to?: No    Past Medical History 2   Have you ever received a blood transfusion?: No  Would you refuse a blood transfusion if a doctor judged it to be  medically necessary?: No   If you answered Yes, would you rather die than receive a blood transfusion?: No  If you answered Yes, is this for Spiritism reasons?: No  Does anyone in your home smoke?: No  Do you use tobacco products?: No  Do you drink beer, wine or hard liquor?: No  Do you use any of the following: marijuana, speed, cocaine, heroin, hallucinogens or other drugs?: No   Is your blood type Rh negative?: No  Have you ever had abnormal antibodies in your blood?: No  Have you ever had asthma?: No  Have you ever had tuberculosis?: No  Do you have any allergies to drugs or over-the-counter medications?: No  Allergies: Dust Mites, Aspartame, Ethanol, Venlafaxine, Hydrochloride, Sertraline: No  Have you had any breast problems?: No  Have you ever ?: (!) Yes  Have you had any gynecological surgical procedures such as cervical conization, a LEEP procedure, laser treatment, cryosurgery of the cervix or a dilation and curettage, etc?: No  Have you ever had any other surgical procedures?: No  Have you been hospitalized for a nonsurgical reason excluding normal delivery?: No  Have you ever had any anesthetic complications?: No  Have you ever had an abnormal pap smear?: No    Past Medical History (Continued)  Do you have a history of abnormalities of the uterus?: No  Did your mother take JEFF or any other hormones when she was pregnant with you?: No  Did it take you more than a year to become pregnant?: No  Have you ever been evaluated or treated for infertility?: No  Is there a history of medical problems in your family, which you feel may be important to this pregnancy?: No  Do you have any other problems we have not asked about which you feel may be important to this pregnancy?: No    Symptoms since last menstrual period  Do you have any of the following symptoms: abdominal pain, blood in stools or urine, chest pain, shortness of breath, coughing or vomiting up blood, your heart racing or skipping beats,  nausea and vomiting, pain on urination or vaginal discharge or bleed: No  Will the patient be 35 years old or older at the time of delivery?: No    Has the patient, baby's father or anyone in either family had:  Thalassemia (Italian, Greek, Mediterranean or  background only) and an MCV result less than 80?: No  Neural tube defect such as meningomyelocele, spina bifida or anencephaly?: No  Congenital heart defect?: No  Down's Syndrome?: No  Ranjeet-Sachs disease (Restorationist, Cajun, Spanish-[place])?: No  Sickle cell disease or trait ()?: No  Hemophilia or other inherited problems of blood?: No  Muscular dystrophy?: No  Cystic fibrosis?: No  Aric's chorea?: No  Mental retardation/autism?: No  If yes, was the person tested for fragile X?: No  Any other inherited genetic or chromosomal disorder?: No  Maternal metabolic disorder (e.g Insulin-dependent diabetes, PKU)?: No  A child with birth defects not listed above?: No  Recurrent pregnancy loss or stillbirth?: No   Has the patient had any medications/street drugs/alcohol since her last menstrual period?: No  Does the patient or baby's father have any other genetic risks?: No    Infection History   Do you object to being tested for Hepatitis B?: No  Do you object to being tested for HIV?: No   Do you feel that you are at high risk for coming in contact with the AIDS virus?: No  Have you ever been treated for tuberculosis?: No  Have you ever had a positive skin test for tuberculosis?: No  Do you live with someone who has tuberculosis?: No  Have you ever been exposed to tuberculosis?: No  Do you have genital herpes?: No  Does your partner have genital herpes?: No  Have you had a viral illness since your last period?: No  Have you ever had gonorrhea, chlamydia, syphilis, venereal warts, trichomoniasis, pelvic inflammatory disease or any other sexually transmitted disease?: No  Do you know if you are a genital group B streptococcus carrier?: No  Have you had  chicken pox/varicella?: Unknown   Have you been vaccinated against chicken Pox?: Unknown  Have you had any other infectious diseases?: No

## 2020-09-23 ENCOUNTER — PRENATAL OFFICE VISIT (OUTPATIENT)
Dept: OBGYN | Facility: CLINIC | Age: 25
End: 2020-09-23
Payer: COMMERCIAL

## 2020-09-23 VITALS — WEIGHT: 206 LBS | SYSTOLIC BLOOD PRESSURE: 102 MMHG | DIASTOLIC BLOOD PRESSURE: 68 MMHG | BODY MASS INDEX: 48.76 KG/M2

## 2020-09-23 DIAGNOSIS — O21.9 NAUSEA/VOMITING IN PREGNANCY: Primary | ICD-10-CM

## 2020-09-23 DIAGNOSIS — O09.299 HISTORY OF MATERNAL THIRD DEGREE PERINEAL LACERATION, CURRENTLY PREGNANT: ICD-10-CM

## 2020-09-23 DIAGNOSIS — Z34.80 PRENATAL CARE, SUBSEQUENT PREGNANCY, UNSPECIFIED TRIMESTER: ICD-10-CM

## 2020-09-23 DIAGNOSIS — O09.299 HISTORY OF PRE-ECLAMPSIA IN PRIOR PREGNANCY, CURRENTLY PREGNANT: ICD-10-CM

## 2020-09-23 DIAGNOSIS — Z34.80 PRENATAL CARE, SUBSEQUENT PREGNANCY: Primary | ICD-10-CM

## 2020-09-23 PROCEDURE — 99207 ZZC FIRST OB VISIT: CPT | Performed by: ADVANCED PRACTICE MIDWIFE

## 2020-09-23 PROCEDURE — 87389 HIV-1 AG W/HIV-1&-2 AB AG IA: CPT | Performed by: ADVANCED PRACTICE MIDWIFE

## 2020-09-23 PROCEDURE — G0499 HEPB SCREEN HIGH RISK INDIV: HCPCS | Performed by: ADVANCED PRACTICE MIDWIFE

## 2020-09-23 RX ORDER — PNV NO.95/FERROUS FUM/FOLIC AC 28MG-0.8MG
1 TABLET ORAL DAILY
Qty: 90 TABLET | Refills: 3 | Status: SHIPPED | OUTPATIENT
Start: 2020-09-23 | End: 2020-12-09

## 2020-09-23 RX ORDER — ONDANSETRON 4 MG/1
TABLET, ORALLY DISINTEGRATING ORAL
COMMUNITY
Start: 2020-09-17 | End: 2020-12-09

## 2020-09-23 RX ORDER — ONDANSETRON 4 MG/1
4 TABLET, ORALLY DISINTEGRATING ORAL EVERY 8 HOURS PRN
Qty: 30 TABLET | Refills: 1 | Status: SHIPPED | OUTPATIENT
Start: 2020-09-23 | End: 2020-12-09

## 2020-09-23 NOTE — NURSING NOTE
"Chief Complaint   Patient presents with     Prenatal Care     New Prenatal Visit       Initial /68 (BP Location: Left arm, Cuff Size: Adult Regular)   Wt 93.4 kg (206 lb)   LMP 07/10/2020 (Approximate)   BMI 48.76 kg/m   Estimated body mass index is 48.76 kg/m  as calculated from the following:    Height as of 20: 1.384 m (4' 6.5\").    Weight as of this encounter: 93.4 kg (206 lb).  BP completed using cuff size: regular    Questioned patient about current smoking habits.  Pt. has never smoked.          The following HM Due: pap smear and Samantha Bailey MA               "

## 2020-09-23 NOTE — PROGRESS NOTES
PRENATAL VISIT   FIRST OBSTETRICAL EXAM - OB     First prenatal visit at 12w1d   26yo    Hx preeclampsia in prior pregnancy  Hyperemesis gravidarum   Partial 3rd degree laceration with first birth  Morbid obesity in pregnancy  Last pap = never    **Full visit not completed due to continuous vomiting and need for IV fluids/medications.  Please see recommendations for future visit. **    Plan:  -Return in 1-2 weeks for FHT and physical (refused doptone today) assessment with labs  -Admission to ER for IV fluids, zofran, and labs  -RX for zofran sent to preferred pharmacy  -Low dose ASA for hx of preE with  heritage  -IOB labs ordered to be drawn at ER when she gets fluids.   -Reviewed prenatal care schedule.   -Optimal nutrition and weight gain discussed. Pregnancy weight gain of 2-11lb encouraged.   -Anticipatory guidance for common pregnancy questions and concerns reviewed.   -Danger s/sx for this trimester reviewed with patient.   -Reviewed genetic screening options with patient, patient is interested in quad screen  -Did not review carrier screening.  Please assess at future visit  -IOB packet given and reviewed with patient.   -Recommend FERNANDO for records from [place] where she had last birth, some notes are scanned in chart.    The patient has the following high risk factors for preeclampsia: hx of preE. Therefore, low-dose aspirin ordered to  Begin now    -Antepartum VTE risk factors: BMI > 40.  -Patient is at increased risk for overt diabetes, so A1C will be added to IOB labs today.  -Return to clinic 1 week for FHT assessment.  Total time spent with patient: 45 minutes, >50% time spent counseling and coordinating care.     Karen Loya is a 25 year old  here today for her first obstetrical exam at 12w1d. Here alone.  The patient reports nausea and vomiting daily, where she is unable to keep any food or fluids down.  She had been given zofran but states it has run out.  It worked some, but  then nausea and vomiting would return.  We discussed taking the medication every 8 hours to help control N/V so she could eat and drink.  She also states she feels very dizzy.  We discussed that may be associated with pregnancy and BP changes but may likely be due to dehydration. Advised generally that she should move slowly to help reduce dizziness and drink fluids when nausea is controlled. Patient's last menstrual period was 07/10/2020 (approximate)., predicting an expected date of delivery of 21.  She had an early US that was inconsistent with this date.  US 20, with EGA of 8 3/7 weeks, giving Estimated Date of Delivery: 2021. Last period was normal.  Her pregnancy is dated by early US.   The patient states that she is in a monogamous relationship and states that she is safe. Offered GC/CT screening today and patient and could not be done due to consistent vomiting and need to leave to go to ER for fluids.    Current symptoms also include: N/V dizziness, fatigue.   Risk factors: hx of preE, morbid obesity.     Social History:     Occupation: [employer]  Partners name: [place],   ?   OB History    Para Term  AB Living   2 1 1 0 0 1   SAB TAB Ectopic Multiple Live Births   0 0 0 0 1      # Outcome Date GA Lbr Robert/2nd Weight Sex Delivery Anes PTL Lv   2 Current            1 Term 19 40w0d  3.175 kg (7 lb) M Vag-Spont   MARLA      Birth Comments: partial 3rd degree lac, IOL for GHTN      History:   Past Medical History:   Diagnosis Date     No pertinent past medical history 2020      Past Surgical History:   Procedure Laterality Date     NO HISTORY OF SURGERY  2020      No family history on file.   Social History     Tobacco Use     Smoking status: Never Smoker     Smokeless tobacco: Never Used   Substance Use Topics     Alcohol use: Never     Frequency: Never     Drug use: Never      Current Outpatient Medications   Medication Sig Dispense Refill     aspirin (ASA) 81 MG EC  tablet Take 1 tablet (81 mg) by mouth daily 90 tablet 2     doxylamine (UNISOM) 25 MG TABS tablet 1/2 to 1 tablet at bedtime 30 tablet 1     ondansetron (ZOFRAN-ODT) 4 MG ODT tab Take 1 tablet (4 mg) by mouth every 8 hours as needed for nausea 30 tablet 1     Prenatal Vit-Fe Fumarate-FA (PRENATAL MULTIVITAMIN W/IRON) 27-0.8 MG tablet Take 1 tablet by mouth daily 90 tablet 3     Prenatal Vit-Fe Fumarate-FA (PRENATAL VITAMIN) 27-0.8 MG TABS Take 1 tablet by mouth daily 90 tablet 3     pyridOXINE (VITAMIN B6) 25 MG tablet Take 1 tablet (25 mg) by mouth 3 times daily as needed (for anusea) 100 tablet 3     ondansetron (ZOFRAN-ODT) 4 MG ODT tab DIS ONE T PO  Q 8 H PRN        No Known Allergies   The patient's medical, surgical and family histories were reviewed and were pertinent to this visit.   Pap smear: Last Pap: never done, Pt wants to defer until PP.       Review of Systems   General: Fatigue but otherwise denies problem   Eyes: Denies problem   Ears/Nose/Throat: Denies problem   Cardiovascular: Denies problem   Respiratory: Denies problem   Gastrointestinal: Nausea with vomiting whenever she eats and drinks anything  Genitourinary: Denies any discharge, vaginal bleeding or itchiness or any other problem   Musculoskeletal: Breast tenderness otherwise denies problem   Skin: dry on feet bilaterally   Neurologic: Dizziness without fainting   Psychiatric: Denies problem   Endocrine: Denies problem   Heme/Lymphatic: Denies problem   Allergic/Immunologic: Denies problem         Objective    Physical Exam:   General Appearance: Alert, cooperative, moderate distress, appears stated age   HEENT: Normocephalic, without obvious abnormality, atraumatic. Conjunctiva/corneas clear   Lungs: Clear to auscultation bilaterally, respirations unlabored   Heart: Regular rate and rhythm, S1 and S2 normal, no murmur, rub, or gallop. No edema to lower extremities.   Abdomen: Gravid, soft, non-tender, bowel sounds active all four  quadrants, no masses.    Musculoskeletal:Moving extremities equally and able to step onto exam table, lay down and sit up unassisted  Skin: Skin color, texture, turgor normal, no rashes or lesions.  Dry hands and feet noted  Neurologic: Alert and oriented x 3. Normal speech, CN II-XII intact  Lab: No results found for any visits on 09/23/20.      '

## 2020-09-25 PROBLEM — O21.9 NAUSEA/VOMITING IN PREGNANCY: Status: ACTIVE | Noted: 2020-09-25

## 2020-09-25 PROBLEM — O09.299 HISTORY OF PRE-ECLAMPSIA IN PRIOR PREGNANCY, CURRENTLY PREGNANT: Status: ACTIVE | Noted: 2020-09-25

## 2020-09-25 PROBLEM — O09.299 HISTORY OF MATERNAL THIRD DEGREE PERINEAL LACERATION, CURRENTLY PREGNANT: Status: ACTIVE | Noted: 2020-09-25

## 2020-09-28 ENCOUNTER — TELEPHONE (OUTPATIENT)
Dept: OBGYN | Facility: CLINIC | Age: 25
End: 2020-09-28

## 2020-09-28 NOTE — TELEPHONE ENCOUNTER
----- Message from Maile Kay CNM sent at 9/25/2020  9:09 PM CDT -----  Regarding: Retrun to clinic  Hi!  I sent this gal to the ER for fluids from her initial OB appt on Wednesday as she was vomiting over and over in clinic.  We did not have labs drawn and asked that the ER do them along with their normal panel.  She also refused FHTs.  It appears as though she never showed up to ER.  Could you please call her and see how she is doing in terms of hydration and nutrition?  She had run out of zofran and I did refill it for her.  Also, please ask her to come in in about a week for FHTs and to complete labs and remainder of exam?  She had stated she wanted to see OB in pregnancy.  Thanks!

## 2020-09-28 NOTE — TELEPHONE ENCOUNTER
Called pt via [email or web address] , ID # [id number].     No answer and unable to leave a message as the mailbox was full.    Raina MCCOY RN

## 2020-10-05 NOTE — TELEPHONE ENCOUNTER
Called via [facility] .   No answer. Unable to leave a message as mailbox was full.    Raina MCCOY RN

## 2020-10-09 NOTE — TELEPHONE ENCOUNTER
[place], we have been unable to reach this pt.     Just wanted to let you know.    Thanks    Raina MCCOY RN

## 2020-10-12 NOTE — TELEPHONE ENCOUNTER
Maile Kay CNM  You; [facility] Triage 3 days ago     Thank you for attempting.  It is documented that we tried to reach her.  I appreciate it.    Message text

## 2020-10-21 ENCOUNTER — PRENATAL OFFICE VISIT (OUTPATIENT)
Dept: OBGYN | Facility: CLINIC | Age: 25
End: 2020-10-21
Payer: COMMERCIAL

## 2020-10-21 VITALS — BODY MASS INDEX: 49.71 KG/M2 | DIASTOLIC BLOOD PRESSURE: 64 MMHG | SYSTOLIC BLOOD PRESSURE: 120 MMHG | WEIGHT: 210 LBS

## 2020-10-21 DIAGNOSIS — Z11.3 ROUTINE SCREENING FOR STI (SEXUALLY TRANSMITTED INFECTION): ICD-10-CM

## 2020-10-21 DIAGNOSIS — O26.892 HEARTBURN DURING PREGNANCY IN SECOND TRIMESTER: ICD-10-CM

## 2020-10-21 DIAGNOSIS — Z87.59 HISTORY OF PRE-ECLAMPSIA: ICD-10-CM

## 2020-10-21 DIAGNOSIS — R12 HEARTBURN DURING PREGNANCY IN SECOND TRIMESTER: ICD-10-CM

## 2020-10-21 DIAGNOSIS — Z34.82 PRENATAL CARE, SUBSEQUENT PREGNANCY IN SECOND TRIMESTER: Primary | ICD-10-CM

## 2020-10-21 LAB
ABO + RH BLD: NORMAL
ABO + RH BLD: NORMAL
BLD GP AB SCN SERPL QL: NORMAL
BLOOD BANK CMNT PATIENT-IMP: NORMAL
ERYTHROCYTE [DISTWIDTH] IN BLOOD BY AUTOMATED COUNT: 16.7 % (ref 10–15)
HBA1C MFR BLD: 5.2 % (ref 0–5.6)
HCT VFR BLD AUTO: 32.9 % (ref 35–47)
HGB BLD-MCNC: 10.9 G/DL (ref 11.7–15.7)
MCH RBC QN AUTO: 27 PG (ref 26.5–33)
MCHC RBC AUTO-ENTMCNC: 33.1 G/DL (ref 31.5–36.5)
MCV RBC AUTO: 81 FL (ref 78–100)
PLATELET # BLD AUTO: 201 10E9/L (ref 150–450)
RBC # BLD AUTO: 4.04 10E12/L (ref 3.8–5.2)
SPECIMEN EXP DATE BLD: NORMAL
WBC # BLD AUTO: 5.4 10E9/L (ref 4–11)

## 2020-10-21 PROCEDURE — 86901 BLOOD TYPING SEROLOGIC RH(D): CPT | Performed by: ADVANCED PRACTICE MIDWIFE

## 2020-10-21 PROCEDURE — 99000 SPECIMEN HANDLING OFFICE-LAB: CPT | Performed by: ADVANCED PRACTICE MIDWIFE

## 2020-10-21 PROCEDURE — 85027 COMPLETE CBC AUTOMATED: CPT | Performed by: ADVANCED PRACTICE MIDWIFE

## 2020-10-21 PROCEDURE — 87389 HIV-1 AG W/HIV-1&-2 AB AG IA: CPT | Performed by: ADVANCED PRACTICE MIDWIFE

## 2020-10-21 PROCEDURE — 86780 TREPONEMA PALLIDUM: CPT | Mod: 90 | Performed by: ADVANCED PRACTICE MIDWIFE

## 2020-10-21 PROCEDURE — 87086 URINE CULTURE/COLONY COUNT: CPT | Performed by: ADVANCED PRACTICE MIDWIFE

## 2020-10-21 PROCEDURE — 83036 HEMOGLOBIN GLYCOSYLATED A1C: CPT | Performed by: ADVANCED PRACTICE MIDWIFE

## 2020-10-21 PROCEDURE — 86900 BLOOD TYPING SEROLOGIC ABO: CPT | Performed by: ADVANCED PRACTICE MIDWIFE

## 2020-10-21 PROCEDURE — 87491 CHLMYD TRACH DNA AMP PROBE: CPT | Performed by: ADVANCED PRACTICE MIDWIFE

## 2020-10-21 PROCEDURE — 86762 RUBELLA ANTIBODY: CPT | Performed by: ADVANCED PRACTICE MIDWIFE

## 2020-10-21 PROCEDURE — 99207 PR PRENATAL VISIT: CPT | Performed by: ADVANCED PRACTICE MIDWIFE

## 2020-10-21 PROCEDURE — 86850 RBC ANTIBODY SCREEN: CPT | Performed by: ADVANCED PRACTICE MIDWIFE

## 2020-10-21 PROCEDURE — 81511 FTL CGEN ABNOR FOUR ANAL: CPT | Mod: 90 | Performed by: ADVANCED PRACTICE MIDWIFE

## 2020-10-21 PROCEDURE — 87591 N.GONORRHOEAE DNA AMP PROB: CPT | Performed by: ADVANCED PRACTICE MIDWIFE

## 2020-10-21 PROCEDURE — 87340 HEPATITIS B SURFACE AG IA: CPT | Performed by: ADVANCED PRACTICE MIDWIFE

## 2020-10-21 PROCEDURE — 36415 COLL VENOUS BLD VENIPUNCTURE: CPT | Performed by: ADVANCED PRACTICE MIDWIFE

## 2020-10-21 RX ORDER — ASPIRIN 81 MG/1
81 TABLET, CHEWABLE ORAL DAILY
Qty: 60 TABLET | Refills: 2 | Status: SHIPPED | OUTPATIENT
Start: 2020-10-21 | End: 2020-12-09

## 2020-10-21 RX ORDER — SUCRALFATE 1 G/1
1 TABLET ORAL
Qty: 90 TABLET | Refills: 1 | Status: SHIPPED | OUTPATIENT
Start: 2020-10-21 | End: 2020-12-09

## 2020-10-21 NOTE — PROGRESS NOTES
S: Endorses heartburn, especially at night. States TUMS hasn't helped in her previous pregnancy. Has not tried anything yet this pregnancy. Frequent N/V has resolved.  Fetal movement Yes  Denies loss of fluid/vb/contractions/pelvic pain    O:  /64   Wt 95.3 kg (210 lb)   LMP 07/10/2020 (Approximate)   BMI 49.71 kg/m    Exam:  Constitutional: Healthy, alert and no distress  Respiratory: Respirations even and unlabored  Gastrointestinal: Abdomen soft, non-tender. Fundus consistent with gestational age. Fetal heart tones heard easily.  Psychiatric: Mentation appears normal and affect normal/bright    A:     ICD-10-CM    1. Prenatal care, subsequent pregnancy in second trimester  Z34.82 NEISSERIA GONORRHOEA PCR     CHLAMYDIA TRACHOMATIS PCR     aspirin (ASA) 81 MG chewable tablet     sucralfate (CARAFATE) 1 GM tablet     US OB > 14 Weeks     ABO/Rh type and screen     Hepatitis B surface antigen     CBC with platelets     HIV Antigen Antibody Combo     Rubella Antibody IgG Quantitative     Treponema Abs w Reflex to RPR and Titer     Urine Culture Aerobic Bacterial     Hemoglobin A1c     Maternal Quad Marker 2nd Trimester     CANCELED: Quad Screen (2nd Tri) ([facility])   2. Routine screening for STI (sexually transmitted infection)  Z11.3 CHLAMYDIA TRACHOMATIS PCR     aspirin (ASA) 81 MG chewable tablet   3. Heartburn during pregnancy in second trimester  O26.892 sucralfate (CARAFATE) 1 GM tablet    R12    4. History of pre-eclampsia  Z87.59 aspirin (ASA) 81 MG chewable tablet       P: OB labs drawn today, including quad screen per patient desire and A1c based on ethnicity and BMI risk factors  Start ASA today for hx preeclampsia. Instructed to take daily until 36 weeks gestation. Rx sent to preferred pharmacy.  Sucralfate 1 g three times daily for heartburn. Rx sent. Discussed avoiding triggers, refraining from eating 2-3 hours before bed, and elevating head at night.  Anatomy ultrasound next visit between  20-22 weeks  Return to clinic in 4 weeks    Edel Victoria, JOSE, APRN, CNM

## 2020-10-22 LAB
BACTERIA SPEC CULT: NO GROWTH
C TRACH DNA SPEC QL NAA+PROBE: NEGATIVE
Lab: NORMAL
N GONORRHOEA DNA SPEC QL NAA+PROBE: NEGATIVE
SPECIMEN SOURCE: NORMAL

## 2020-10-23 LAB
HBV SURFACE AG SERPL QL IA: NONREACTIVE
HIV 1+2 AB+HIV1 P24 AG SERPL QL IA: NONREACTIVE
RUBV IGG SERPL IA-ACNC: 195 IU/ML
T PALLIDUM AB SER QL: NONREACTIVE

## 2020-10-24 DIAGNOSIS — O99.012 ANEMIA DURING PREGNANCY IN SECOND TRIMESTER: Primary | ICD-10-CM

## 2020-10-24 LAB
# FETUSES US: NORMAL
# FETUSES: NORMAL
AFP ADJ MOM AMN: 0.73
AFP SERPL-MCNC: 21 NG/ML
AGE - REPORTED: 26.3 YR
CURRENT SMOKER: NO
CURRENT SMOKER: NO
DIABETES STATUS PATIENT: NO
FAMILY MEMBER DISEASES HX: NO
FAMILY MEMBER DISEASES HX: NO
GA METHOD: NORMAL
GA METHOD: NORMAL
GA: NORMAL WK
HCG MOM SERPL: 0.98
HCG SERPL-ACNC: NORMAL IU/L
HX OF HEREDITARY DISORDERS: NO
IDDM PATIENT QL: NO
INHIBIN A MOM SERPL: 0.73
INHIBIN A SERPL-MCNC: 94 PG/ML
INTEGRATED SCN PATIENT-IMP: NORMAL
IVF PREGNANCY: NO
LMP START DATE: NORMAL
MONOCHORIONIC TWINS: NO
PATHOLOGY STUDY: NORMAL
PREV FETUS DEFECT: NO
SERVICE CMNT-IMP: NO
SPECIMEN DRAWN SERPL: NORMAL
U ESTRIOL MOM SERPL: 1.03
U ESTRIOL SERPL-MCNC: 0.92 NG/ML
VALPROIC/CARBAMAZEPINE STATUS: NO
WEIGHT UNITS: NORMAL

## 2020-10-24 RX ORDER — FERROUS GLUCONATE 324(38)MG
324 TABLET ORAL
Qty: 90 TABLET | Refills: 1 | Status: SHIPPED | OUTPATIENT
Start: 2020-10-24 | End: 2020-12-09

## 2020-12-09 ENCOUNTER — PRENATAL OFFICE VISIT (OUTPATIENT)
Dept: OBGYN | Facility: CLINIC | Age: 25
End: 2020-12-09
Attending: ADVANCED PRACTICE MIDWIFE
Payer: COMMERCIAL

## 2020-12-09 ENCOUNTER — ANCILLARY PROCEDURE (OUTPATIENT)
Dept: ULTRASOUND IMAGING | Facility: CLINIC | Age: 25
End: 2020-12-09
Attending: ADVANCED PRACTICE MIDWIFE
Payer: COMMERCIAL

## 2020-12-09 ENCOUNTER — TELEPHONE (OUTPATIENT)
Dept: OBGYN | Facility: CLINIC | Age: 25
End: 2020-12-09

## 2020-12-09 VITALS — DIASTOLIC BLOOD PRESSURE: 72 MMHG | BODY MASS INDEX: 51.13 KG/M2 | SYSTOLIC BLOOD PRESSURE: 106 MMHG | WEIGHT: 216 LBS

## 2020-12-09 DIAGNOSIS — Z11.3 ROUTINE SCREENING FOR STI (SEXUALLY TRANSMITTED INFECTION): ICD-10-CM

## 2020-12-09 DIAGNOSIS — O99.012 ANEMIA DURING PREGNANCY IN SECOND TRIMESTER: ICD-10-CM

## 2020-12-09 DIAGNOSIS — Z34.82 PRENATAL CARE, SUBSEQUENT PREGNANCY IN SECOND TRIMESTER: Primary | ICD-10-CM

## 2020-12-09 DIAGNOSIS — Z87.59 HISTORY OF PRE-ECLAMPSIA: ICD-10-CM

## 2020-12-09 DIAGNOSIS — Z34.82 PRENATAL CARE, SUBSEQUENT PREGNANCY IN SECOND TRIMESTER: ICD-10-CM

## 2020-12-09 PROCEDURE — 99207 PR PRENATAL VISIT: CPT | Performed by: ADVANCED PRACTICE MIDWIFE

## 2020-12-09 PROCEDURE — 76805 OB US >/= 14 WKS SNGL FETUS: CPT | Performed by: OBSTETRICS & GYNECOLOGY

## 2020-12-09 RX ORDER — ASPIRIN 81 MG/1
81 TABLET, CHEWABLE ORAL DAILY
Qty: 60 TABLET | Refills: 2 | Status: SHIPPED | OUTPATIENT
Start: 2020-12-09 | End: 2020-12-09

## 2020-12-09 RX ORDER — FERROUS GLUCONATE 324(38)MG
324 TABLET ORAL
Qty: 90 TABLET | Refills: 1 | Status: SHIPPED | OUTPATIENT
Start: 2020-12-09 | End: 2020-12-09

## 2020-12-09 NOTE — PROGRESS NOTES
S: Feels well and has no concerns. Having difficulty with transportation to her visits.  Baby active.  Denies uterine cramping, vaginal bleeding or leaking of fluid  O: Vitals: /72 (BP Location: Left arm, Cuff Size: Adult Regular)   Wt 98 kg (216 lb)   LMP 07/10/2020 (Approximate)   BMI 51.13 kg/m    BMI= Body mass index is 51.13 kg/m .  Exam:  Constitutional: healthy, alert and no distress  Respiratory: respirations even and unlabored  Gastrointestinal: Abdomen soft, non-tender. Fundus measures appropriate for gestational age. Fetal heart tones hear without difficulty and within normal limits  : Deferred  Psychiatric: mentation appears normal and affect normal/bright  A:    Diagnosis Comments   1. Prenatal care, subsequent pregnancy in second trimester         P: Discussed GCT/repeat RPR for next visit, handout provided, reminded of longer appointment.  Tdap next visit; reviewed CDC recommendations and partner/family vaccination recommended as well.  Need for Rhogam? No, to be done next visit   Encouraged patient to call with any questions or concerns.  Return to clinic 4 weeks    LAXMI Sanchez, HALINA

## 2020-12-09 NOTE — NURSING NOTE
"Chief Complaint   Patient presents with     Prenatal Care       Initial /72 (BP Location: Left arm, Cuff Size: Adult Regular)   Wt 98 kg (216 lb)   LMP 07/10/2020 (Approximate)   BMI 51.13 kg/m   Estimated body mass index is 51.13 kg/m  as calculated from the following:    Height as of 20: 1.384 m (4' 6.5\").    Weight as of this encounter: 98 kg (216 lb).  BP completed using cuff size: regular    Questioned patient about current smoking habits.  Pt. has never smoked.           Would like vitamin D and iron.  Patient states that the [facility] is easier for her to get to, she is not able to drive sometimes.     Jack Bailey MA             "

## 2020-12-09 NOTE — TELEPHONE ENCOUNTER
Patient calling with [place] .  Lab results reviewed.   She has not been taking asa or iron.  (send to 2 different pharmacies)  Both med's faxed to preferred pharmacy.  Stressed importance of keeping ultrasound and follow up appt today. She will get an Uber ride to appts today.  She usually is seen in [place] because it is close to her home.  Dilma Sivla RN

## 2020-12-10 ENCOUNTER — PATIENT OUTREACH (OUTPATIENT)
Dept: NURSING | Facility: CLINIC | Age: 25
End: 2020-12-10
Payer: COMMERCIAL

## 2020-12-10 NOTE — PROGRESS NOTES
Clinic Care Coordination Contact  Community Health Worker Initial Outreach    CHW Initial Information Gathering:  Referral Source: PCP  Preferred Hospital: [facility], [place]  [phone]  Current living arrangement:: I live in a private home with family  Type of residence:: Apartment  Community Resources: [facility]  Supplies used at home:: None  Equipment Currently Used at Home: none  Informal Support system:: Children, Family, Friends  No PCP office visit in Past Year: No  Transportation means:: Regular car  CHW Additional Questions  If ED/Hospital discharge, follow-up appointment scheduled as recommended?: N/A  Medication changes made following ED/Hospital discharge?: N/A  MyChart active?: No  Patient agreeable to assistance with activating MyChart?: No    Patient accepts CC: Yes. Patient scheduled for assessment with YARIEL Rinaldi , on 12/10/20 at 10AM. Patient noted desire to discuss CC.     Spoke to Karen  KYMBERLY introduced self and intent of call regarding recent referral from OB.  Complex Medical Concerns/Education: Chronic diagnosis waiting for a dx, Financial Support: Insurance and Medication Affordability and Mental Wellness (Health) (Mental Illness/Chemical Depedency): Treatment Options  Additional pertinent details: Struggling with disability paperwork and her health.     Patient acknowledged, stating that she does have a car.  However, she's need other modes of tranportation since she's pregnant and may get dizziness while she's driving.    CHW introduced roles with CC and offered assessment with CC to assess for support and resources.  Patient agreed to follow up.    CRISTIANE Ordoñez  Clinic Care Coordination  [facility] : [place], [place], [place], and [place]  Phone: [phone]

## 2020-12-11 ENCOUNTER — PATIENT OUTREACH (OUTPATIENT)
Dept: NURSING | Facility: CLINIC | Age: 25
End: 2020-12-11
Attending: ADVANCED PRACTICE MIDWIFE
Payer: COMMERCIAL

## 2020-12-11 ENCOUNTER — TRANSCRIBE ORDERS (OUTPATIENT)
Dept: MATERNAL FETAL MEDICINE | Facility: CLINIC | Age: 25
End: 2020-12-11

## 2020-12-11 DIAGNOSIS — Z34.82 PRENATAL CARE, SUBSEQUENT PREGNANCY IN SECOND TRIMESTER: Primary | ICD-10-CM

## 2020-12-11 DIAGNOSIS — O26.90 PREGNANCY RELATED CONDITION, ANTEPARTUM: Primary | ICD-10-CM

## 2020-12-11 ASSESSMENT — ACTIVITIES OF DAILY LIVING (ADL): DEPENDENT_IADLS:: TRANSPORTATION;INDEPENDENT

## 2020-12-11 NOTE — LETTER
[facility] Home  Complex Care Plan  About Me:    Patient Name:  Karen Loya    YOB: 1995  Age:         25 year old   Cedric MRN:    [id number] Telephone Information:  Home Phone [phone]   Mobile [phone]       Address:  [address]  [facility] [address] Email address:  No e-mail address on record      Emergency Contact(s)    Name Relationship Lgl Grd Work Phone Home Phone Mobile Phone   1. NONE PER PT Other    [phone]           Primary language:  Portuguese     needed? Yes   [place] [employer]:  [phone] op. 1  Other communication barriers: English as a second language  Preferred Method of Communication:   Phone, Mail  Current living arrangement: I live in a private home with family  Mobility Status/ Medical Equipment: Independent    Health Maintenance  Health Maintenance Reviewed: Due/Overdue   Health Maintenance Due   Topic Date Due     PREVENTIVE CARE VISIT  1995     HPV IMMUNIZATION (1 - 2-dose series) 01/01/2006     HEPATITIS C SCREENING  01/01/2013     PAP  01/01/2016     PHQ-2  01/01/2020     DTAP/TDAP/TD IMMUNIZATION (1 - Tdap) 01/01/2020     INFLUENZA VACCINE (1) 09/01/2020     OBGCT (OB)  12/15/2020       My Access Plan  Medical Emergency 911   Primary Clinic Line [facility] [phone]   24 Hour Appointment Line [phone] or  [phone]) (toll-free)   24 Hour Nurse Line [phone] (toll-free)   Preferred Urgent Care [facility], [phone]   Preferred Hospital [facility]  [phone]   Preferred Pharmacy [facility] [address]     Behavioral [facility] Crisis Line The National Suicide Prevention Lifeline at 1-531.922.7524 or 911       My Care Team Members  Patient Care Team       Relationship Specialty Notifications Start End    No Ref-Primary, Physician PCP - General   7/19/19      Fax: [phone]         Yahaira Mooney PA-C Assigned PCP   7/23/20     Phone: [phone] Fax: [phone]         [address] [facility] [address]    Edel Victoria APRN REEM Assigned OBGYN Provider   11/15/20     Phone: [phone] Fax: [phone] [address] [facility] [address]    Sandy Paz LGSW Lead Care Coordinator   12/11/20     Artemio Harper Community Health Worker   12/11/20             My Care Plans  Self Management and Treatment Plan  Goals and (Comments)  Goals        General    1. Transportation (pt-stated)     Notes - Note created  12/11/2020 11:12 AM by Sandy Paz LGSW    Goal Statement: I want to access my insurance-covered transportation benefit through [employer] within 3 months.   Date Goal set: 12.11.2020  Barriers: COVID-19, English as a second language.   Strengths: Recognition of need, CC involvement and support.   Date to Achieve By: 3.11.2021  Patient expressed understanding of goal: Pt reports understanding and denies any additional questions or concerns at this times. SW CC engaged in AIDET communication during encounter.    Action steps to achieve this goal:  1. I will review [employer] information.   2. I will contact [employer] to schedule a ride.   3. I will use the transportation benefit through [employer] for appointments.                    My Medical and Care Information  Problem List   Patient Active Problem List   Diagnosis     History of pre-eclampsia in prior pregnancy, currently pregnant     Nausea/vomiting in pregnancy     History of maternal third degree perineal laceration, currently pregnant      Current Medications and Allergies:  See printed Medication Report.  No current outpatient medications      Care Coordination Start Date: 12/11/2020   Frequency of Care Coordination: monthly   Form Last Updated: 12/11/2020     DAVIS Rinaldi  Clinic Care Coordinator  Ph.  [phone]  [email or web address]

## 2020-12-11 NOTE — LETTER
[facility]  [address] [place], [address]  December 11, 2020      Karen Loya  [address] [place] [address] [address]  [facility] [address]      Dear Karen,    I am a clinic care coordinator who works at the [facility]. I wanted to thank you for taking the time to speak with me over the phone.  Below is a description of clinic care coordination and how I can further assist you.      The clinic care coordination team is made up of a registered nurse,  and community health worker who understand the health care system. The goal of clinic care coordination is to help you manage your health and improve access to the health care system in the most efficient manner. The team can assist you in meeting your health care goals by providing education, coordinating services, strengthening the communication among your providers and supporting you with any resource needs.    As we discussed on the phone, I've included the information for [employer] below:    To schedule, change or cancel a ride, call: toll-free [phone] ([phone]) Monday through Friday, 8 a.m. to 5 p.m.    Eligibility: You may be able to get a ride with a participating provider if you do not have access to your own ride and are a member of [facility] ([facility]).   How the program works: A BlueRide representative will make sure you re eligible. If you are, you ll get one of these services: ?? A public bus pass if you live in    a public transit service area ?? A scheduled taxi or other ride service if you live outside a public transit service area ?? If you have a disability or other special needs, [employer] will find another type of ride to meet your needs    Using the program: Here are things to keep in mind when using [employer]: ?? [employer] is for covered medical, dental and mental health appointments, to  prescriptions, and to  or repair durable medical equipment (DME).   You can also  use BlueRide when discharged from a hospital. ?? Rides are available for the member. The member may have an escort accompany them. ?? For bus passes, call at least 10 business days before your appointment. ?? If you need to change or cancel your ride, call a minimum of 4 hours before your scheduled pick-up time. ?? [employer] members may also use [employer] for visits  to a fitness facility once a  day (round trip). ?? The  will not be able to make unplanned stops or drop you off other than your scheduled drop off location.      Please feel free to contact the Community Health Worker, Artemio, at [phone] with any questions or concerns. We are focused on providing you with the highest-quality healthcare experience possible and that all starts with you.     ----    Colin Bennett isjosselinuwaha daryeelka rugta caafimaadka neetu brina [facility]. Tai almarazugu mahadceliyo sida aad waqtiga ii siisay inaad telefoonmiriam skaggs. Harrisonos chantel cooper sharaxadam quickbssarah iskuduwida daryeelka rugta caafimaadka iyo sida sulemann nilo root.    Kooxda isuduwidda daryeelka caafimaadka jordon galarza tapeewee kalkaaliye caafimaad oo diiwaangashan, shaqaale bulsho iyo shaqaale caafimaad bulshada oo fahmay nidaamka daryeelka caafimaadka. Ujeedada isuduwidda daryeelka bukaan socod eegtada ayaa ah inay kaa caawiso inaad maareyso caafimaadkaaga iyo inaad hagaajiso helitaanka nidaamka daryeelka caafimaadka sida ugu habboon. Kooxda ayaa kaomer perez karcindi inatalon la clive hadafyadaada daryeelka caafimaadka adiga oo siinaya waxbarasho, isku-dubbarid adeegyo, xoojinta xiriirka ka dhexeeya bixiyeyaashaada iyo inay rufinagu taageerasarah wivikkii baahiyo aad u baahan tahay    Sidii sulemann taleefanka uga wada hadalnay, waxaan ku soo daray macluumaadka loogu talagalay [employer] hoosta:    Linnette randhawa, katrina baeza, [facility]: vivienne rodriguez  [phone] () Isniinta illaa [place], 8da subaxnimo ilaa 5 galabnimo.    U-qalmitaanka: Waxaa laga yaabaa inaad la fay karto raacitaan bixiye ka qaybqaadanaya haddii aadan montgomery u lahayn safarkaaga oo aad xubin ka tahay [employer] (HMO SNP).  Sidee barnaamijku u shaqeeyaa: Wakiilka [employer] ayaa hubin doona inaad xaq u leedahay. Haddii aad tahay, waxaad fay doontaa mid ka mid ah adeegyadan: ?? Kaarka baska dadweynaha haddii aad ku nooshahay   aagga adeegga gaadiidka dadweynaha ?? Taksi la qorsheeyay ama adeeg kale oo raacitaan ah haddii aad ku nooshahay meel ka baxsan aagga adeegga gaadiidka dadweynaha ?? Haddii aad leedahay naafo ama baahiyo kale oo gaar ah, [employer] waxay fay doontaa nooc kale oo fuulitaan ah si loo daboolo baahiyahaaga    Isticmaalka barnaamijka: Waa kuwan waxyaabo ay tahay in maanka lagu hayo marka la isticmaalayo [employer]: ?? [employer] waxaa loogu talagalay ballamaha caafimaadka, ilkaha iyo caafimaadka maskaxda oo daboolan, si ay u beau qaadato rijeetooyinka, iyo in ay beau qaadato ama ku dayactirto qalab caafimaad oo waara (DME).  Waxaad sidoo kale isticmaali kartaa [employer] markii lagaa saarayo isbitaalka. ?? Duulimaadyo ayaa loo fay karaa xubinta. Xubinta waxaa laga yaabaa inay wehel galiso weheliso. ?? Geoffreyfranchesca soler, [facility] ugu yaraan 10 maalmood oo shaqo ka hor ballantaada. ?? Haddii aad u baahan tahay inaad beddesho ama aad baajiso lizzetha, [facility] ugu yaraan 4 saacadood ka hor wakhtiga laguu qabtay. ?? Xubnaha [employer] sidoo kale waxay u isticmaali karaan [employer]ide booqashooyinka xarunta jirdhiska umer mar maalintii (mary mcnamara ). ?? Juan ahmadi in sameo michaelogsitracy aan la sii qorsheyn ama uu ku dejiyo wax aan ka ahayn goobta aad ka degtay.      Lizbethlan si xor michael jackson xiriiArtemio Reid lambarka [phone] wimichael taylor'aalo rogelio brooks . Fortinoaan beatrizrada jai hilliardan rufina lunao summer fowler  oo macquul ah wax walbana adiga ayey kaa bilaabayaan.  Sincerely,       Sandy Vásquez, [facility]  Clinic Care Coordinator  Ph. [phone]  [email or web address]      Enclosed: I have enclosed a copy of the Complex Care Plan. This has helpful information and goals that we have talked about. Please keep this in an easy to access place to use as needed.

## 2020-12-11 NOTE — PROGRESS NOTES
Clinic Care Coordination Contact    Clinic Care Coordination Contact  OUTREACH    Referral Information:  Referral Source: PCP  Primary Diagnosis: Psychosocial    Chief Complaint   Patient presents with     Clinic Care Coordination - Initial     Psychosocial        [place] Utilization: Reviewed on this date.   Difficulty keeping appointments: No  Compliance Concerns: No  No-Show Concerns: Yes  No PCP office visit in Past Year: No  Utilization    Last refreshed: 12/11/2020 10:56 AM: Hospital Admissions 0           Last refreshed: 12/11/2020 10:56 AM: ED Visits 0           Last refreshed: 12/11/2020 10:56 AM: No Show Count (past year) 2              Current as of: 12/11/2020 10:56 AM            Clinical Concerns:  Current Medical Concerns:    Patient Active Problem List   Diagnosis     History of pre-eclampsia in prior pregnancy, currently pregnant     Nausea/vomiting in pregnancy     History of maternal third degree perineal laceration, currently pregnant       Current Behavioral Concerns: None noted at this time.    Education Provided to patient: Role of SHRAVAN CC and reason for appointment.    Pain  Pain  No  Health Maintenance Reviewed: Due/Overdue   Health Maintenance Due   Topic Date Due     PREVENTIVE CARE VISIT  1995     HPV IMMUNIZATION (1 - 2-dose series) 01/01/2006     HEPATITIS C SCREENING  01/01/2013     PAP  01/01/2016     PHQ-2  01/01/2020     DTAP/TDAP/TD IMMUNIZATION (1 - Tdap) 01/01/2020     INFLUENZA VACCINE (1) 09/01/2020     OBGCT (OB)  12/15/2020     Clinical Pathway: None    Medication Management:  Independent.      Functional Status:  Dependent ADLs: Independent  Dependent IADLs: Transportation, Independent  Bed or wheelchair confined: No  Mobility Status: Independent  Fallen 2 or more times in the past year?: No  Any fall with injury in the past year?: No    Living Situation:  Current living arrangement: I live in a private home with family  Type of residence: Apartment    Lifestyle &  Psychosocial Needs:  Diet: Regular  Inadequate nutrition  No  Tube Feeding: No  Inadequate activity/exercise  No  Significant changes in sleep pattern  No  Transportation means: Regular car     Sikh or spiritual beliefs that impact treatment: No  Mental health DX: No  Mental health management concern  No  Chemical Dependency Status: No Current Concerns  Informal Support system: Children, Family, Friends   Socioeconomic History     Marital status:      Spouse name: Not on file     Number of children: Not on file     Years of education: Not on file     Highest education level: Not on file     Tobacco Use     Smoking status: Never Smoker     Smokeless tobacco: Never Used   Substance and Sexual Activity     Alcohol use: Never     Frequency: Never     Drug use: Never     Sexual activity: Yes     Partners: Male     Call placed to Patient with the support of a [employer] . Patient reports that her sole concern at this time is transportation to/from medical appointments. Pt has been using an Uber for longer drives, like those to the [facility]. We reviewed the availability of [employer] and covered transportation to/from medical appointments. Pt endorses understanding and is agreeable to contacting [employer] for transportation.     SW CC inquired about other reasons for the CC order. Pt declines any additional concerns.      Resources and Interventions:  Community Resources: [facility] Programs  Supplies used at home: None  Equipment Currently Used at Home: none  Employment Status: unemployed)    Advance Care Plan/Directive  Advanced Care Plans/Directives on file: No  Advanced Care Plan/Directive Status: Considering Options    Referrals Placed: Transportation     Goals:   Goals        General    1. Transportation (pt-stated)     Notes - Note created  12/11/2020 11:12 AM by Sandy Paz LGSW    Goal Statement: I want to access my insurance-covered transportation benefit through [employer] within 3  months.   Date Goal set: 12.11.2020  Barriers: COVID-19, English as a second language.   Strengths: Recognition of need, CC involvement and support.   Date to Achieve By: 3.11.2021  Patient expressed understanding of goal: Pt reports understanding and denies any additional questions or concerns at this times. SW CC engaged in AIDET communication during encounter.    Action steps to achieve this goal:  1. I will review Blue Ride information.   2. I will contact [employer] to schedule a ride.   3. I will use the transportation benefit through [employer] for appointments.              Patient/Caregiver understanding: Pt reports understanding and denies any additional questions or concerns at this times. SW CC engaged in AIDET communication during encounter.      Outreach Frequency: monthly  Future Appointments              In 3 weeks Clementina Smith CNM [facility] [place] [place]          Plan: Introduction to CC letter, Blue Ride information, and Complex Care Plan mailed to Patient on this date. Patient will review the letter and contact [employer] for next scheduled appointment. CHW will outreach to Patient in one month to discuss goal progression. SW CC will remain available for CC needs and will schedule a clinical review in 6 weeks.    Sandy Vásquez [facility]  Clinic Care Coordinator  Ph. [phone]  [email or web address]

## 2020-12-18 ENCOUNTER — PRE VISIT (OUTPATIENT)
Dept: MATERNAL FETAL MEDICINE | Facility: CLINIC | Age: 25
End: 2020-12-18

## 2020-12-22 ENCOUNTER — HOSPITAL ENCOUNTER (OUTPATIENT)
Dept: ULTRASOUND IMAGING | Facility: CLINIC | Age: 25
End: 2020-12-22
Attending: ADVANCED PRACTICE MIDWIFE
Payer: COMMERCIAL

## 2020-12-22 ENCOUNTER — OFFICE VISIT (OUTPATIENT)
Dept: MATERNAL FETAL MEDICINE | Facility: CLINIC | Age: 25
End: 2020-12-22
Attending: ADVANCED PRACTICE MIDWIFE
Payer: COMMERCIAL

## 2020-12-22 DIAGNOSIS — O26.90 PREGNANCY RELATED CONDITION, ANTEPARTUM: ICD-10-CM

## 2020-12-22 DIAGNOSIS — O99.213 MATERNAL OBESITY SYNDROME, ANTEPARTUM, THIRD TRIMESTER: ICD-10-CM

## 2020-12-22 DIAGNOSIS — O99.210 OBESITY AFFECTING PREGNANCY, ANTEPARTUM: Primary | ICD-10-CM

## 2020-12-22 PROCEDURE — 76811 OB US DETAILED SNGL FETUS: CPT

## 2020-12-22 PROCEDURE — 76811 OB US DETAILED SNGL FETUS: CPT | Mod: 26 | Performed by: OBSTETRICS & GYNECOLOGY

## 2020-12-22 NOTE — PROGRESS NOTES
Please see full imaging report from [facility] program under imaging tab.      Hugo Jules MD  Maternal Fetal Medicine

## 2021-01-20 ENCOUNTER — PATIENT OUTREACH (OUTPATIENT)
Dept: CARE COORDINATION | Facility: CLINIC | Age: 26
End: 2021-01-20

## 2021-01-20 NOTE — PROGRESS NOTES
Clinic Care Coordination Contact  [facility]/Voicemail       Clinical Data: Care Coordinator Outreach  Outreach attempted x 1.  Left message on patient's voicemail with call back information and requested return call.    Plan:  Care Coordinator will try to reach patient again in 10 business days.    CRISTIANE Ordoñez  Clinic Care Coordination  [facility] Clinics : [place], [place], and [place]  Phone: [phone]    ______________________  Next Outreach:  02/03/21  Planned Outreach Frequency: Monthly  Preferred Phone Number: [phone]    Icelandic  needed    Enrollment Date:  12/11/20  Last Care Plan Assessment:  12/11/20

## 2021-02-01 ENCOUNTER — PATIENT OUTREACH (OUTPATIENT)
Dept: CARE COORDINATION | Facility: CLINIC | Age: 26
End: 2021-02-01

## 2021-02-01 ASSESSMENT — ACTIVITIES OF DAILY LIVING (ADL): DEPENDENT_IADLS:: TRANSPORTATION;INDEPENDENT

## 2021-02-01 NOTE — PROGRESS NOTES
Clinic Care Coordination Contact    Situation: Patient chart reviewed by care coordinator.    Background: Patient is enrolled in Clinic Care Coordination with the goal of accessing transportation benefits through her health insurance.     Assessment: Patient has not yet been reachable for follow-up to address goal progression.     Plan/Recommendations: CHW will outreach as previously planned to address goal progression with Patient. SW CC will remain available for CC needs and will schedule a clinical review in 6 weeks.     Sandy Vásquez [facility]  Clinic Care Coordinator  Ph. [phone]  [email or web address]

## 2021-02-02 ENCOUNTER — HOSPITAL ENCOUNTER (OUTPATIENT)
Dept: ULTRASOUND IMAGING | Facility: CLINIC | Age: 26
End: 2021-02-02
Attending: OBSTETRICS & GYNECOLOGY
Payer: COMMERCIAL

## 2021-02-02 ENCOUNTER — OFFICE VISIT (OUTPATIENT)
Dept: MATERNAL FETAL MEDICINE | Facility: CLINIC | Age: 26
End: 2021-02-02
Attending: OBSTETRICS & GYNECOLOGY
Payer: COMMERCIAL

## 2021-02-02 VITALS — DIASTOLIC BLOOD PRESSURE: 80 MMHG | HEART RATE: 80 BPM | SYSTOLIC BLOOD PRESSURE: 125 MMHG

## 2021-02-02 DIAGNOSIS — O99.210 OBESITY AFFECTING PREGNANCY, ANTEPARTUM: ICD-10-CM

## 2021-02-02 DIAGNOSIS — O99.210 OBESITY AFFECTING PREGNANCY, ANTEPARTUM: Primary | ICD-10-CM

## 2021-02-02 PROCEDURE — 76816 OB US FOLLOW-UP PER FETUS: CPT | Mod: 26 | Performed by: OBSTETRICS & GYNECOLOGY

## 2021-02-02 PROCEDURE — 76816 OB US FOLLOW-UP PER FETUS: CPT

## 2021-02-02 NOTE — NURSING NOTE
Patient here for MFM visit, originally declined  and wanted spouse to interpret, explained to patient it is  policy to provide professional .  ID #[id number] used via the phone for MD discussion re: U/S. Patient was walked to primary OB clinic to make appointment with them for OB care.

## 2021-02-02 NOTE — PROGRESS NOTES
Karen Shalini was seen for an ultrasound today at the Maternal-Fetal Medicine center.      For the details of the ultrasound please see the report which can be found under the imaging tab.      Maile Wheatley MD  , OB/GYN  Maternal-Fetal Medicine  [email or web address]  [phone] (Main M Office)  [phone] or [phone] (for 24 hour MFM questions)  [phone] (Pager)

## 2021-02-03 ENCOUNTER — PRENATAL OFFICE VISIT (OUTPATIENT)
Dept: OBGYN | Facility: CLINIC | Age: 26
End: 2021-02-03
Payer: COMMERCIAL

## 2021-02-03 VITALS — WEIGHT: 225 LBS | BODY MASS INDEX: 53.26 KG/M2 | DIASTOLIC BLOOD PRESSURE: 60 MMHG | SYSTOLIC BLOOD PRESSURE: 104 MMHG

## 2021-02-03 DIAGNOSIS — Z34.83 PRENATAL CARE, SUBSEQUENT PREGNANCY IN THIRD TRIMESTER: Primary | ICD-10-CM

## 2021-02-03 LAB
ERYTHROCYTE [DISTWIDTH] IN BLOOD BY AUTOMATED COUNT: 16.5 % (ref 10–15)
HCT VFR BLD AUTO: 28.7 % (ref 35–47)
HGB BLD-MCNC: 9.2 G/DL (ref 11.7–15.7)
MCH RBC QN AUTO: 25 PG (ref 26.5–33)
MCHC RBC AUTO-ENTMCNC: 32.1 G/DL (ref 31.5–36.5)
MCV RBC AUTO: 78 FL (ref 78–100)
PLATELET # BLD AUTO: 221 10E9/L (ref 150–450)
RBC # BLD AUTO: 3.68 10E12/L (ref 3.8–5.2)
WBC # BLD AUTO: 7.8 10E9/L (ref 4–11)

## 2021-02-03 PROCEDURE — 86780 TREPONEMA PALLIDUM: CPT | Mod: 90 | Performed by: ADVANCED PRACTICE MIDWIFE

## 2021-02-03 PROCEDURE — 82950 GLUCOSE TEST: CPT | Performed by: ADVANCED PRACTICE MIDWIFE

## 2021-02-03 PROCEDURE — 85027 COMPLETE CBC AUTOMATED: CPT | Performed by: ADVANCED PRACTICE MIDWIFE

## 2021-02-03 PROCEDURE — 99000 SPECIMEN HANDLING OFFICE-LAB: CPT | Performed by: ADVANCED PRACTICE MIDWIFE

## 2021-02-03 PROCEDURE — 36415 COLL VENOUS BLD VENIPUNCTURE: CPT | Performed by: ADVANCED PRACTICE MIDWIFE

## 2021-02-03 PROCEDURE — 99207 PR PRENATAL VISIT: CPT | Performed by: ADVANCED PRACTICE MIDWIFE

## 2021-02-03 NOTE — PROGRESS NOTES
S: Feels well and has no concerns. Has been going to [facility] appointments, but has not been seen in this office since early December.  Baby active.  Denies uterine cramping, vaginal bleeding or leaking of fluid  O: Vitals: /60 (BP Location: Right arm, Cuff Size: Adult Large)   Wt 102.1 kg (225 lb)   LMP 07/10/2020 (Approximate)   BMI 53.26 kg/m    BMI= Body mass index is 53.26 kg/m .  Exam:  Constitutional: healthy, alert and no distress  Respiratory: respirations even and unlabored  Gastrointestinal: Abdomen soft, non-tender. Fundus measures appropriate for gestational age. Fetal heart tones hear without difficulty and within normal limits  : Deferred  Psychiatric: mentation appears normal and affect normal/bright  A:     ICD-10-CM    1. Prenatal care, subsequent pregnancy in third trimester  Z34.83 Glucose tolerance, gest screen, 1 hour     CBC with platelets     Treponema Abs w Reflex to RPR and Titer   2. BMI 50.0-59.9, adult (H)  Z68.43      P: GCT/repeat RPR today, handout provided.  Tdap given; reviewed CDC recommendations and partner/family vaccination recommended as well.  Need for Rhogam? No.   Discussed that high BMI in combination with one more risk factor may necessitate referral to MD for remainder of pregnancy.   Encouraged patient to call with any questions or concerns.  Return to clinic 2 weeks. Stressed importance of continued regularly scheduled appointments with primary OB provider.    LAXMI Sanchez, REEM

## 2021-02-03 NOTE — NURSING NOTE
"Chief Complaint   Patient presents with     Prenatal Care     31w 1d GCT today       Initial /60 (BP Location: Right arm, Cuff Size: Adult Large)   Wt 102.1 kg (225 lb)   LMP 07/10/2020 (Approximate)   BMI 53.26 kg/m   Estimated body mass index is 53.26 kg/m  as calculated from the following:    Height as of 20: 1.384 m (4' 6.5\").    Weight as of this encounter: 102.1 kg (225 lb).  BP completed using cuff size: large    Questioned patient about current smoking habits.  Pt. has never smoked.          The following HM Due: NONE  Kayla House CMA    "

## 2021-02-04 ENCOUNTER — TELEPHONE (OUTPATIENT)
Dept: OBGYN | Facility: CLINIC | Age: 26
End: 2021-02-04

## 2021-02-04 DIAGNOSIS — O99.013 ANEMIA IN PREGNANCY, THIRD TRIMESTER: Primary | ICD-10-CM

## 2021-02-04 LAB
GLUCOSE 1H P 50 G GLC PO SERPL-MCNC: 99 MG/DL (ref 60–129)
T PALLIDUM AB SER QL: NONREACTIVE

## 2021-02-04 RX ORDER — FERROUS SULFATE 325(65) MG
325 TABLET, DELAYED RELEASE (ENTERIC COATED) ORAL EVERY OTHER DAY
Qty: 30 TABLET | Refills: 3 | Status: SHIPPED | OUTPATIENT
Start: 2021-02-04 | End: 2021-02-04

## 2021-02-04 RX ORDER — FERROUS SULFATE 325(65) MG
325 TABLET, DELAYED RELEASE (ENTERIC COATED) ORAL EVERY OTHER DAY
Qty: 30 TABLET | Refills: 3 | Status: ON HOLD | OUTPATIENT
Start: 2021-02-04 | End: 2021-04-17

## 2021-02-04 NOTE — TELEPHONE ENCOUNTER
Called pt via [facility] interpretor.  Advised.  She prefers [facility] in [place].  Rx sent there instead.    Kaylee Choe RN

## 2021-02-04 NOTE — TELEPHONE ENCOUNTER
Please call Karen and let her know that her hemoglobin, or iron in her blood, is very low. I have ordered her some supplements that I would like her to take every other day. I have sent the prescription to the [facility] in [facility]. We will recheck her hgb in about one month.    The remainder of her labs were normal.    Thank you,    LAXMI Sanchez, HALINA

## 2021-02-08 NOTE — RESULT ENCOUNTER NOTE
I agree that given her BMI and concern for macrosomia that she would likely benefit from transfer of care to the MD service.     Jaskaran

## 2021-02-16 ENCOUNTER — PATIENT OUTREACH (OUTPATIENT)
Dept: NURSING | Facility: CLINIC | Age: 26
End: 2021-02-16
Payer: COMMERCIAL

## 2021-02-16 NOTE — PROGRESS NOTES
Clinic Care Coordination Contact  Community Health Worker Follow Up  Spoke to Karen    Goals:   Goals Addressed as of 2/16/2021 at 11:55 AM                 Today    12/11/20      1. Transportation (pt-stated)   100%  On track    Added 12/11/20 by Sandy Paz, SHRAVAN     Goal Statement: I want to access my insurance-covered transportation benefit through [employer] within 3 months.   Date Goal set: 12.11.2020  Barriers: COVID-19, English as a second language.   Strengths: Recognition of need, CC involvement and support.   Date to Achieve By: 3.11.2021  Patient expressed understanding of goal: Pt reports understanding and denies any additional questions or concerns at this times. SW CC engaged in AIDET communication during encounter.    Action steps to achieve this goal:  1. I will review [employer] information.   2. I will contact [employer] to schedule a ride.   3. I will use the transportation benefit through [employer] for appointments.      02/16, CHW:    CHW confirmed that Karen had everything that she needed to utilize the [employer] services.    Patient confirmed that she understands and has already been utilizing the services.    Has no further questions regarding transportation.    Patient stated to complete goal  Goal has been completed        Intervention and Education during outreach:   CHW inquired if there was any additional support or resources needed at this time.  However, patient had no other questions or concerns.  We discussed transitioning to Maintenance, which patient agreed.  Karen understands that she can contact CC at any time if there are any changes.    CHW Plan: CHW has routed encounter to YARIEL Rinaldi, to review maintenance plan.    CRISTIANE Ordoñez  Clinic Care Coordination  [facility] Clinics : [place], [place], and [place]  Phone: [phone]    ______________________  Next Outreach:  04/16/21  Planned Outreach Frequency: Maintenance  Preferred  Phone Number: [phone]    French  needed    Enrollment Date:  12/11/20  Last Care Plan Assessment:  12/11/20

## 2021-02-17 NOTE — TELEPHONE ENCOUNTER
CHW documentation reviewed. Patient is appropriate for Maintenance. CHW to outreach to Patient in 2 months to review Graduation. SW CC will remain available for CC needs.     Sandy Vásquez, [facility]  Clinic Care Coordinator  Ph. [phone]  [email or web address]

## 2021-02-18 ENCOUNTER — PRENATAL OFFICE VISIT (OUTPATIENT)
Dept: OBGYN | Facility: CLINIC | Age: 26
End: 2021-02-18
Payer: COMMERCIAL

## 2021-02-18 VITALS — WEIGHT: 225 LBS | BODY MASS INDEX: 53.26 KG/M2 | SYSTOLIC BLOOD PRESSURE: 122 MMHG | DIASTOLIC BLOOD PRESSURE: 70 MMHG

## 2021-02-18 DIAGNOSIS — Z34.83 PRENATAL CARE, SUBSEQUENT PREGNANCY IN THIRD TRIMESTER: Primary | ICD-10-CM

## 2021-02-18 PROCEDURE — 99207 PR PRENATAL VISIT: CPT | Performed by: OBSTETRICS & GYNECOLOGY

## 2021-02-18 RX ORDER — VITAMIN B COMPLEX
TABLET ORAL DAILY
COMMUNITY
End: 2021-06-15

## 2021-02-18 NOTE — NURSING NOTE
"Chief Complaint   Patient presents with     Prenatal Care     33 2/7 weeks       Initial /70   Wt 102.1 kg (225 lb)   LMP 07/10/2020 (Approximate)   BMI 53.26 kg/m   Estimated body mass index is 53.26 kg/m  as calculated from the following:    Height as of 20: 1.384 m (4' 6.5\").    Weight as of this encounter: 102.1 kg (225 lb).  BP completed using cuff size: large    Questioned patient about current smoking habits.  Pt. has never smoked.          The following HM Due: NONE    +fetal movement  -swelling    Paige Diallo, CMA    "

## 2021-02-18 NOTE — PROGRESS NOTES
Transfer of Care visit    Doing well.   She expresses confusion about why she is seeing the MDs instead of the CNM service.   She has no complaints. She expresses that this pregnancy feels not as much of a burden physically as her first pregnancy. This despite the fact that her baby recently measured greater than the 99th percentile for EFW and with an AC measurement greater than the 99th percentile as well.   She chronicles the events of her first pregnancy and how it resulted in an induction of labor a few days prior to her due date for gestational hypertension. She delivered in [place].  She also reports a 3rd degree perineal laceration from an infant weight of 7 lbs. Overall, otherwise, the labor occurred fairly fast. Her induction was started at sunset and she delivered at 1 am in the morning.   Today, otherwise, she denies VB, ctx, LOF.   She reports active fetal movements.   /70   Wt 102.1 kg (225 lb)   LMP 07/10/2020 (Approximate)   BMI 53.26 kg/m    General Appearance: NAD  Abdomen: Gravid, NT  Refer to flow sheet above.   A/P: 26 year old  at 33w2d  -- reviewed rationale for transfer of care from CNM service to MD service with specific focus as it relates to her BMI and concern for fetal macrosomia and its related possible delivery complications ie shoulder dystocia   -- also reviewed that given her 3rd degree laceration had occurred with a 7lbs infant and that there is fetal macrosomia concerns for this pregnancy, to consider induction of labor at 39w; also discussed  delivery as an alternative to avoid another significant perineal laceration; patient elects to proceed with induction of labor at 39w gestation in which we agreed to the date of the induction being 3/30  -- reviewed with patient the physician coverage for our [facility] group; patient conveys understanding of this  -- has follow-up growth ultrasound with MFM on 3/2   -- PTL precautions reviewed  RTC in 2 weeks  for GBS and cervix check; patient expresses desire to have appointment with female physician for duration of her pregnancy moving forward      Jaskaran Hancock MD  [facility]

## 2021-03-02 ENCOUNTER — OFFICE VISIT (OUTPATIENT)
Dept: MATERNAL FETAL MEDICINE | Facility: CLINIC | Age: 26
End: 2021-03-02
Attending: OBSTETRICS & GYNECOLOGY
Payer: COMMERCIAL

## 2021-03-02 ENCOUNTER — PRENATAL OFFICE VISIT (OUTPATIENT)
Dept: OBGYN | Facility: CLINIC | Age: 26
End: 2021-03-02
Payer: COMMERCIAL

## 2021-03-02 ENCOUNTER — HOSPITAL ENCOUNTER (OUTPATIENT)
Dept: ULTRASOUND IMAGING | Facility: CLINIC | Age: 26
End: 2021-03-02
Attending: OBSTETRICS & GYNECOLOGY
Payer: COMMERCIAL

## 2021-03-02 VITALS
DIASTOLIC BLOOD PRESSURE: 60 MMHG | HEIGHT: 64 IN | SYSTOLIC BLOOD PRESSURE: 100 MMHG | WEIGHT: 222 LBS | BODY MASS INDEX: 37.9 KG/M2

## 2021-03-02 DIAGNOSIS — O99.210 OBESITY AFFECTING PREGNANCY, ANTEPARTUM: ICD-10-CM

## 2021-03-02 DIAGNOSIS — Z34.80 SUPERVISION OF OTHER NORMAL PREGNANCY, ANTEPARTUM: ICD-10-CM

## 2021-03-02 DIAGNOSIS — Z34.83 PRENATAL CARE, SUBSEQUENT PREGNANCY IN THIRD TRIMESTER: ICD-10-CM

## 2021-03-02 DIAGNOSIS — O26.90 PREGNANCY RELATED CONDITION, ANTEPARTUM: Primary | ICD-10-CM

## 2021-03-02 PROCEDURE — 99207 PR PRENATAL VISIT: CPT | Performed by: OBSTETRICS & GYNECOLOGY

## 2021-03-02 PROCEDURE — 76816 OB US FOLLOW-UP PER FETUS: CPT

## 2021-03-02 PROCEDURE — 76816 OB US FOLLOW-UP PER FETUS: CPT | Mod: 26 | Performed by: OBSTETRICS & GYNECOLOGY

## 2021-03-02 NOTE — PROGRESS NOTES
Please see ultrasound report under Imaging tab for details of today's ultrasound.    Tania Hayes MD  Maternal-Fetal Medicine

## 2021-03-02 NOTE — NURSING NOTE
[facility]  used via phone for today's [facility] visit.

## 2021-03-02 NOTE — PROGRESS NOTES
"26 year old  at 35w0d     BMI originally listed at 55 - height was corrected today to her true height of 5'4\" which makes her new BMI 38.  Baby measuring >99%ile, has repeat EFW today with MFM.  Planning for IOL at 39+0 on 3/30 with Dr Chong.  Will schedule her some appointments with Dr Chong for delivery planning, I did offer PLTCS today in case of an EFW >5000g, she strongly desires .   GBS next visit.  Declines TDaP.  Plans to breastfeed.     RTC weekly until delivery     Carolee Lomas MD, MPH  [facility]     "

## 2021-03-12 ENCOUNTER — PRENATAL OFFICE VISIT (OUTPATIENT)
Dept: OBGYN | Facility: CLINIC | Age: 26
End: 2021-03-12
Payer: COMMERCIAL

## 2021-03-12 VITALS — SYSTOLIC BLOOD PRESSURE: 100 MMHG | DIASTOLIC BLOOD PRESSURE: 60 MMHG | WEIGHT: 222 LBS | BODY MASS INDEX: 38.11 KG/M2

## 2021-03-12 DIAGNOSIS — Z36.85 SCREENING, ANTENATAL, FOR STREPTOCOCCUS B: Primary | ICD-10-CM

## 2021-03-12 PROCEDURE — 87653 STREP B DNA AMP PROBE: CPT | Performed by: OBSTETRICS & GYNECOLOGY

## 2021-03-12 PROCEDURE — 99207 PR PRENATAL VISIT: CPT | Performed by: OBSTETRICS & GYNECOLOGY

## 2021-03-12 NOTE — PROGRESS NOTES
26 year old  at 36w3d     BMI 38 (originally incorrect in the chart listed at 55) but EFW >99%ile on growth scan (although not extrapolated to be >5000g at 39 wks, no indication for primary CS).  Had discussed IOL at 39 wks, offered to return to [facility] care, will schedule her with the provider who is on call on 3/30 when she could plan the induction.    GBS today    RTC weekly until delivery     Carolee Lomas MD, MPH  [facility]

## 2021-03-12 NOTE — NURSING NOTE
"Chief Complaint   Patient presents with     Prenatal Care     36 3/7 weeks       Initial /60   Wt 100.7 kg (222 lb)   LMP 07/10/2020 (Approximate)   BMI 38.11 kg/m   Estimated body mass index is 38.11 kg/m  as calculated from the following:    Height as of 3/2/21: 1.626 m (5' 4\").    Weight as of this encounter: 100.7 kg (222 lb).  BP completed using cuff size: large    Questioned patient about current smoking habits.  Pt. has never smoked.          The following HM Due: NONE  +fetal movement  -swelling    Paige Diallo, CMA    "

## 2021-03-13 LAB
GP B STREP DNA SPEC QL NAA+PROBE: POSITIVE
SPECIMEN SOURCE: ABNORMAL

## 2021-03-15 PROBLEM — O99.820 GBS (GROUP B STREPTOCOCCUS CARRIER), +RV CULTURE, CURRENTLY PREGNANT: Status: ACTIVE | Noted: 2021-03-15

## 2021-03-17 ENCOUNTER — PRENATAL OFFICE VISIT (OUTPATIENT)
Dept: OBGYN | Facility: CLINIC | Age: 26
End: 2021-03-17
Payer: COMMERCIAL

## 2021-03-17 VITALS — WEIGHT: 225 LBS | SYSTOLIC BLOOD PRESSURE: 102 MMHG | DIASTOLIC BLOOD PRESSURE: 54 MMHG | BODY MASS INDEX: 38.62 KG/M2

## 2021-03-17 DIAGNOSIS — O09.93 SUPERVISION OF HIGH RISK PREGNANCY IN THIRD TRIMESTER: Primary | ICD-10-CM

## 2021-03-17 DIAGNOSIS — O99.013 ANEMIA IN PREGNANCY, THIRD TRIMESTER: ICD-10-CM

## 2021-03-17 DIAGNOSIS — Z87.59 HISTORY OF PRE-ECLAMPSIA: ICD-10-CM

## 2021-03-17 PROCEDURE — 99207 PR PRENATAL VISIT: CPT | Performed by: ADVANCED PRACTICE MIDWIFE

## 2021-03-17 NOTE — PROGRESS NOTES
S: Feels well,  Baby active.  Denies uterine cramping, vaginal bleeding or leaking of fluid. No headache, increase in edema, no epigastric pain. Karen is here with her  today who speaks English and can translate.   She did have regular contractions overnight a few nights ago but they have since gone away. She would like a cervical exam today.  We discussed that given her prepregnancy BMI >30, can consider 39 week IOL, she is open to this.    O: Vitals: /54   Wt 102.1 kg (225 lb)   LMP 07/10/2020 (Approximate)   BMI 38.62 kg/m    BMI= Body mass index is 38.62 kg/m .  Exam:  Constitutional: healthy, alert and no distress  Respiratory: respirations even and unlabored  Gastrointestinal: Abdomen soft, non-tender. Fundus measures appropriate for gestational age. Fetal heart tones hear without difficulty and within normal limits  : Normal external genitalia without lesions  SVE: /-3  Psychiatric: mentation appears normal and affect normal/bright    A/P:  (O09.93) Supervision of high risk pregnancy in third trimester  (primary encounter diagnosis)  -Prepregnancy BMI 35. Current BMI 38.62  -Plan for 39 week IOL, will have pt RTO next week for repeat cervical exam and to schedule IOL, COVID testing.   -GBS positive, plan for PCN in labor   -Rev s/sx of labor, FM monitoring     (O36.63X0) Fetal macrosomia in third trimester, single or unspecified fetus  -Infant measuring in >99%ile on last growth ultrasound with MFM at 35 weeks. -Extrapolated EFW <5000gm at 39 weeks, therefore not a candidate for      (Z68.38) BMI 38.0-38.9,adult    (Z87.59) History of pre-eclampsia  -Has been on 81mg ASA during this pregnancy   -Reviewed sx of PEC.     (O99.013) Anemia in pregnancy, third trimester  -Hgb 9.2 at 32 weeks, on oral iron  -Repeat CBC ordered for today    VINOD Wilkins CNM 3/17/2021 4:45 PM

## 2021-03-17 NOTE — PATIENT INSTRUCTIONS
"Labor Instructions for Midwife Patients    When to call:  Both during and after office hours call [phone]. There is a Nurse Midwife available to take your calls and answer your questions 24 hours a day.     When to call:  Call anytime you have important concerns about you or your baby.     Call if:    You are having contractions at regular intervals about 5-6 minutes apart lasting 30-60 seconds and becoming increasingly more intense     You have an uncontrollable gush of fluid from your vagina or feel a pop and gush like your water has broken    You have HEAVY bleeding, like heavy period, blood running down your legs, or  soaking a pad.     Some bleeding after a pelvic exam, after intercourse, or in labor when your cervix is dilating is normal and is referred to as \"bloody show\"    You have severe, continuous back or abdominal pain    You feel it is time to go to the hospital    If this is your first labor, call when contractions are very intense and have been about every 3-4 minutes for about an hour    If it is your second labor or more, call when contractions are strong and about every 3-5 minutes or sooner depending on your level of discomfort.     Keep in mind we are always here for you! If you have questions, concerns please don't hesitate to call us.     What to eat/drink in labor: Drink plenty of fluid (water most importantly, juice, soda or tea without caffeine). Eat rice, pasta, soup, cereal, bread/toast, and fruit. Avoid dairy and greasy food as they are difficult to digest and you may experience some nausea during labor.    Comfort measures:    Baths and showers (ok even with ruptured membranes, it may temporarily slow contractions if you are still in the early stage of labor)    Warm/hot packs for back pain or discomfort    Back, belly, or thigh massages    Standing, rocking, walking, leaning over bed or tables, side-lying and sleeping    Miscellaneous:     Contractions are timed from the beginning " of one to the beginning of the next    Try hard to sleep during the early stage of labor when you are not that uncomfortable. Timing of contractions at this point is not important    Even if you cannot sleep, resting in bed or on the couch can help you maintain your energy for labor    When you arrive at the hospital the nurse will check your baby's heartbeat, check your cervix, and will call us. The midwife on call will come in and be with you when you are in active labor    After hours you need to enter the hospital through the emergency room

## 2021-03-17 NOTE — NURSING NOTE
"Chief Complaint   Patient presents with     Prenatal Care     37w 1d c/o abd pain during night       Initial /54   LMP 07/10/2020 (Approximate)  Estimated body mass index is 38.11 kg/m  as calculated from the following:    Height as of 3/2/21: 1.626 m (5' 4\").    Weight as of 3/12/21: 100.7 kg (222 lb).  BP completed using cuff size: regular    Questioned patient about current smoking habits.  Pt. has never smoked.          The following HM Due: NONE  Kayla House CMA    "

## 2021-03-18 ENCOUNTER — TELEPHONE (OUTPATIENT)
Dept: OBGYN | Facility: CLINIC | Age: 26
End: 2021-03-18

## 2021-03-18 DIAGNOSIS — O99.013 ANEMIA IN PREGNANCY, THIRD TRIMESTER: ICD-10-CM

## 2021-03-18 DIAGNOSIS — O99.013 ANEMIA IN PREGNANCY, THIRD TRIMESTER: Primary | ICD-10-CM

## 2021-03-18 LAB
ERYTHROCYTE [DISTWIDTH] IN BLOOD BY AUTOMATED COUNT: 18.1 % (ref 10–15)
HCT VFR BLD AUTO: 28.8 % (ref 35–47)
HGB BLD-MCNC: 9 G/DL (ref 11.7–15.7)
MCH RBC QN AUTO: 23.2 PG (ref 26.5–33)
MCHC RBC AUTO-ENTMCNC: 31.3 G/DL (ref 31.5–36.5)
MCV RBC AUTO: 74 FL (ref 78–100)
PLATELET # BLD AUTO: 205 10E9/L (ref 150–450)
RBC # BLD AUTO: 3.88 10E12/L (ref 3.8–5.2)
WBC # BLD AUTO: 7.8 10E9/L (ref 4–11)

## 2021-03-18 PROCEDURE — 82728 ASSAY OF FERRITIN: CPT | Performed by: ADVANCED PRACTICE MIDWIFE

## 2021-03-18 PROCEDURE — 36415 COLL VENOUS BLD VENIPUNCTURE: CPT | Performed by: ADVANCED PRACTICE MIDWIFE

## 2021-03-18 PROCEDURE — 85027 COMPLETE CBC AUTOMATED: CPT | Performed by: ADVANCED PRACTICE MIDWIFE

## 2021-03-18 RX ORDER — HEPARIN SODIUM (PORCINE) LOCK FLUSH IV SOLN 100 UNIT/ML 100 UNIT/ML
5 SOLUTION INTRAVENOUS
Status: CANCELLED | OUTPATIENT
Start: 2021-03-22

## 2021-03-18 RX ORDER — HEPARIN SODIUM,PORCINE 10 UNIT/ML
5 VIAL (ML) INTRAVENOUS
Status: CANCELLED | OUTPATIENT
Start: 2021-03-22

## 2021-03-18 NOTE — TELEPHONE ENCOUNTER
Called pt to discuss CBC results with [facility] , Pt did not answer and voicemail is not yet set up.  Her hemoglobin has gone down to 9.0  I would like for her to get set up for an iron transfusion ASAP prior to delivery.   I have placed the orders for this as well as for her COVID-19 test that will need to be done beforehand.     VINOD Wilkins CNM 3/18/2021 4:05 PM

## 2021-03-19 LAB — FERRITIN SERPL-MCNC: 5 NG/ML (ref 12–150)

## 2021-03-23 NOTE — TELEPHONE ENCOUNTER
Pt has an appt with Alia on 3/24/21. I added a note to the appt.   Also, will route this message back to the provider as an FYI that we have not been able to reach this pt.     Raina MCCOY RN

## 2021-03-24 ENCOUNTER — PRENATAL OFFICE VISIT (OUTPATIENT)
Dept: OBGYN | Facility: CLINIC | Age: 26
End: 2021-03-24
Payer: COMMERCIAL

## 2021-03-24 VITALS — WEIGHT: 224 LBS | BODY MASS INDEX: 38.45 KG/M2 | SYSTOLIC BLOOD PRESSURE: 98 MMHG | DIASTOLIC BLOOD PRESSURE: 56 MMHG

## 2021-03-24 DIAGNOSIS — O99.013 ANEMIA IN PREGNANCY, THIRD TRIMESTER: ICD-10-CM

## 2021-03-24 DIAGNOSIS — O09.93 SUPERVISION OF HIGH RISK PREGNANCY IN THIRD TRIMESTER: Primary | ICD-10-CM

## 2021-03-24 PROCEDURE — 99207 PR PRENATAL VISIT: CPT | Performed by: ADVANCED PRACTICE MIDWIFE

## 2021-03-24 NOTE — NURSING NOTE
"Chief Complaint   Patient presents with     Prenatal Care     38w 1d no concerns       Initial BP 98/56 (BP Location: Left arm, Cuff Size: Adult Large)   Wt 101.6 kg (224 lb)   LMP 07/10/2020 (Approximate)   BMI 38.45 kg/m   Estimated body mass index is 38.45 kg/m  as calculated from the following:    Height as of 3/2/21: 1.626 m (5' 4\").    Weight as of this encounter: 101.6 kg (224 lb).  BP completed using cuff size: large    Questioned patient about current smoking habits.  Pt. has never smoked.          The following HM Due: NONE  Kayla House CMA    "

## 2021-03-24 NOTE — PROGRESS NOTES
S: Feels well,  Baby active.  Denies uterine cramping, vaginal bleeding or leaking of fluid. No headache, increase in edema, no epigastric pain.   Having trouble sleeping, uncomfortable.   We discussed 39 week induction given pre-pregnancy BMI of >35, she is open to this.  She was recommended after last visit to get set up for iron transfusions but was unable to be reached to schedule. Her voicemail has not been set up. It's unlikely at this point that she will be able to get in for an iron transfusion prior to her IOL next week.   Contact information updated in patient chart, 's contact info added. He does speak English and is able to translate for Karen if needed.    O: Vitals: BP 98/56 (BP Location: Left arm, Cuff Size: Adult Large)   Wt 101.6 kg (224 lb)   LMP 07/10/2020 (Approximate)   BMI 38.45 kg/m    BMI= Body mass index is 38.45 kg/m .  Exam:  Constitutional: healthy, alert and no distress  Respiratory: respirations even and unlabored  Gastrointestinal: Abdomen soft, non-tender. Fundus measures appropriate for gestational age. Fetal heart tones hear without difficulty and within normal limits  : Normal external genitalia without lesions  Aguirre: 3  Psychiatric: mentation appears normal and affect normal/bright  A/P:  (O09.93) Supervision of high risk pregnancy in third trimester  (primary encounter diagnosis)  -Reviewed GBS positive, plan for PCN in labor  -Discussed s/sx of labor, FM monitoring  -IOL scheduled for 39 weeks- 3/30 at 7:30pm, procedure explained, COVID-19 swab ordered to be done no sooner than 3 days prior to induction date      (Z68.38) BMI 38.0-38.9,adult    (O36.63X0) Fetal macrosomia in third trimester, single or unspecified fetus  -Infant measuring in >99%ile on last growth ultrasound with MFM at 35 weeks. -Extrapolated EFW <5000gm at 39 weeks, therefore not a candidate for      (O99.013) Anemia in pregnancy, third trimester  -Hgb 9.0 at 37 weeks   -Was unable to be  reached to set up iron transfusions, will reassess need postpartum     HTrevor Wilkins CNM 3/24/2021 4:49 PM

## 2021-03-30 ENCOUNTER — TELEPHONE (OUTPATIENT)
Dept: OBGYN | Facility: CLINIC | Age: 26
End: 2021-03-30

## 2021-03-30 NOTE — TELEPHONE ENCOUNTER
Called and talked with Karen Mitchell's  regarding induction. Discussed that indication for  induction is pre-pregnancy BMI of >35 as well as suspected fetal macrosomia. Although both of these are considerations for IOL at 39 weeks(discussed in detail at her prenatal visits) expectant management is also an option. They feel they have a good understanding of the risks associated with fetal macrosomia and obesity in pregnancy, however do not wish to pursue 39wk induction as originally scheduled. She would like to give her body more time to prepare for labor. R/B/A to this plan were discussed. Recommended in clinic appointment with [facility] for this week.      Pt was provided with on-call [facility] pager number in addition to clinic number.    All questions solicited and answered.     VINOD Wilkins CNM 3/30/2021 3:26 PM

## 2021-03-30 NOTE — TELEPHONE ENCOUNTER
"pts  calling for pt. Pt does not speak english well.   Pt is not wanting to be induced tonight.   \"wants to wait to go naturally\".  States that she is not feeling any signs of labor yet.    Routed to the provider as an FYI and L&D needs to be notified.  I did go over a few reasons to be induced as mentioned in the last OV. Fetal macrosomia and anemia were noted.    Raina MCCOY RN        "

## 2021-04-02 ENCOUNTER — TELEPHONE (OUTPATIENT)
Dept: OBGYN | Facility: CLINIC | Age: 26
End: 2021-04-02

## 2021-04-02 NOTE — TELEPHONE ENCOUNTER
Please call Karen and schedule a prenatal appointment with one of the midwives for Monday, April 5,2021.      Thank you,    LAXMI Sanchez, REEM

## 2021-04-05 ENCOUNTER — HOSPITAL ENCOUNTER (INPATIENT)
Facility: CLINIC | Age: 26
LOS: 2 days | Discharge: HOME | End: 2021-04-07
Attending: OBSTETRICS & GYNECOLOGY | Admitting: ADVANCED PRACTICE MIDWIFE
Payer: COMMERCIAL

## 2021-04-05 ENCOUNTER — PRENATAL OFFICE VISIT (OUTPATIENT)
Dept: OBGYN | Facility: CLINIC | Age: 26
End: 2021-04-05
Payer: COMMERCIAL

## 2021-04-05 VITALS — DIASTOLIC BLOOD PRESSURE: 60 MMHG | SYSTOLIC BLOOD PRESSURE: 96 MMHG | WEIGHT: 224 LBS | BODY MASS INDEX: 38.45 KG/M2

## 2021-04-05 DIAGNOSIS — O09.93 SUPERVISION OF HIGH RISK PREGNANCY IN THIRD TRIMESTER: Primary | ICD-10-CM

## 2021-04-05 DIAGNOSIS — O99.013 ANEMIA IN PREGNANCY, THIRD TRIMESTER: ICD-10-CM

## 2021-04-05 DIAGNOSIS — Z87.59 HISTORY OF PRE-ECLAMPSIA: ICD-10-CM

## 2021-04-05 LAB
ABO + RH BLD: NORMAL
ABO + RH BLD: NORMAL
BASOPHILS # BLD AUTO: 0 10E9/L (ref 0–0.2)
BASOPHILS NFR BLD AUTO: 0.3 %
DIFFERENTIAL METHOD BLD: ABNORMAL
EOSINOPHIL # BLD AUTO: 0.1 10E9/L (ref 0–0.7)
EOSINOPHIL NFR BLD AUTO: 1.4 %
ERYTHROCYTE [DISTWIDTH] IN BLOOD BY AUTOMATED COUNT: 18.9 % (ref 10–15)
HCT VFR BLD AUTO: 29.1 % (ref 35–47)
HGB BLD-MCNC: 9.2 G/DL (ref 11.7–15.7)
IMM GRANULOCYTES # BLD: 0 10E9/L (ref 0–0.4)
IMM GRANULOCYTES NFR BLD: 0.5 %
LABORATORY COMMENT REPORT: ABNORMAL
LYMPHOCYTES # BLD AUTO: 1.9 10E9/L (ref 0.8–5.3)
LYMPHOCYTES NFR BLD AUTO: 26 %
MCH RBC QN AUTO: 22.7 PG (ref 26.5–33)
MCHC RBC AUTO-ENTMCNC: 31.6 G/DL (ref 31.5–36.5)
MCV RBC AUTO: 72 FL (ref 78–100)
MONOCYTES # BLD AUTO: 0.7 10E9/L (ref 0–1.3)
MONOCYTES NFR BLD AUTO: 9 %
NEUTROPHILS # BLD AUTO: 4.6 10E9/L (ref 1.6–8.3)
NEUTROPHILS NFR BLD AUTO: 62.8 %
NRBC # BLD AUTO: 0 10*3/UL
NRBC BLD AUTO-RTO: 0 /100
PLATELET # BLD AUTO: 239 10E9/L (ref 150–450)
RBC # BLD AUTO: 4.05 10E12/L (ref 3.8–5.2)
SARS-COV-2 RNA RESP QL NAA+PROBE: POSITIVE
SPECIMEN EXP DATE BLD: NORMAL
SPECIMEN SOURCE: ABNORMAL
WBC # BLD AUTO: 7.4 10E9/L (ref 4–11)

## 2021-04-05 PROCEDURE — 120N000001 HC R&B MED SURG/OB

## 2021-04-05 PROCEDURE — 86901 BLOOD TYPING SEROLOGIC RH(D): CPT | Performed by: ADVANCED PRACTICE MIDWIFE

## 2021-04-05 PROCEDURE — 36415 COLL VENOUS BLD VENIPUNCTURE: CPT | Performed by: ADVANCED PRACTICE MIDWIFE

## 2021-04-05 PROCEDURE — 87635 SARS-COV-2 COVID-19 AMP PRB: CPT | Performed by: ADVANCED PRACTICE MIDWIFE

## 2021-04-05 PROCEDURE — 85025 COMPLETE CBC W/AUTO DIFF WBC: CPT | Performed by: ADVANCED PRACTICE MIDWIFE

## 2021-04-05 PROCEDURE — 3E0P7VZ INTRODUCTION OF HORMONE INTO FEMALE REPRODUCTIVE, VIA NATURAL OR ARTIFICIAL OPENING: ICD-10-PCS | Performed by: ADVANCED PRACTICE MIDWIFE

## 2021-04-05 PROCEDURE — 99207 PR PRENATAL VISIT: CPT | Performed by: ADVANCED PRACTICE MIDWIFE

## 2021-04-05 PROCEDURE — 86900 BLOOD TYPING SEROLOGIC ABO: CPT | Performed by: ADVANCED PRACTICE MIDWIFE

## 2021-04-05 PROCEDURE — 250N000013 HC RX MED GY IP 250 OP 250 PS 637: Performed by: ADVANCED PRACTICE MIDWIFE

## 2021-04-05 RX ORDER — ACETAMINOPHEN 325 MG/1
650 TABLET ORAL EVERY 4 HOURS PRN
Status: DISCONTINUED | OUTPATIENT
Start: 2021-04-05 | End: 2021-04-07 | Stop reason: HOSPADM

## 2021-04-05 RX ORDER — OXYTOCIN/0.9 % SODIUM CHLORIDE 30/500 ML
100-340 PLASTIC BAG, INJECTION (ML) INTRAVENOUS CONTINUOUS PRN
Status: DISCONTINUED | OUTPATIENT
Start: 2021-04-05 | End: 2021-04-07 | Stop reason: HOSPADM

## 2021-04-05 RX ORDER — OXYTOCIN 10 [USP'U]/ML
10 INJECTION, SOLUTION INTRAMUSCULAR; INTRAVENOUS
Status: DISCONTINUED | OUTPATIENT
Start: 2021-04-05 | End: 2021-04-07 | Stop reason: HOSPADM

## 2021-04-05 RX ORDER — OXYCODONE AND ACETAMINOPHEN 5; 325 MG/1; MG/1
1 TABLET ORAL
Status: DISCONTINUED | OUTPATIENT
Start: 2021-04-05 | End: 2021-04-07 | Stop reason: HOSPADM

## 2021-04-05 RX ORDER — MORPHINE SULFATE 10 MG/ML
10 INJECTION, SOLUTION INTRAMUSCULAR; INTRAVENOUS ONCE
Status: DISCONTINUED | OUTPATIENT
Start: 2021-04-05 | End: 2021-04-07 | Stop reason: HOSPADM

## 2021-04-05 RX ORDER — CARBOPROST TROMETHAMINE 250 UG/ML
250 INJECTION, SOLUTION INTRAMUSCULAR
Status: DISCONTINUED | OUTPATIENT
Start: 2021-04-05 | End: 2021-04-07 | Stop reason: HOSPADM

## 2021-04-05 RX ORDER — IBUPROFEN 800 MG/1
800 TABLET, FILM COATED ORAL
Status: DISCONTINUED | OUTPATIENT
Start: 2021-04-05 | End: 2021-04-07 | Stop reason: HOSPADM

## 2021-04-05 RX ORDER — NALOXONE HYDROCHLORIDE 0.4 MG/ML
0.4 INJECTION, SOLUTION INTRAMUSCULAR; INTRAVENOUS; SUBCUTANEOUS
Status: DISCONTINUED | OUTPATIENT
Start: 2021-04-05 | End: 2021-04-07 | Stop reason: HOSPADM

## 2021-04-05 RX ORDER — ONDANSETRON 2 MG/ML
4 INJECTION INTRAMUSCULAR; INTRAVENOUS EVERY 6 HOURS PRN
Status: DISCONTINUED | OUTPATIENT
Start: 2021-04-05 | End: 2021-04-07 | Stop reason: HOSPADM

## 2021-04-05 RX ORDER — HYDROXYZINE HYDROCHLORIDE 50 MG/1
100 TABLET, FILM COATED ORAL EVERY 6 HOURS PRN
Status: DISCONTINUED | OUTPATIENT
Start: 2021-04-05 | End: 2021-04-07 | Stop reason: HOSPADM

## 2021-04-05 RX ORDER — NALOXONE HYDROCHLORIDE 0.4 MG/ML
0.2 INJECTION, SOLUTION INTRAMUSCULAR; INTRAVENOUS; SUBCUTANEOUS
Status: DISCONTINUED | OUTPATIENT
Start: 2021-04-05 | End: 2021-04-07 | Stop reason: HOSPADM

## 2021-04-05 RX ORDER — PENICILLIN G POTASSIUM 5000000 [IU]/1
5 INJECTION, POWDER, FOR SOLUTION INTRAMUSCULAR; INTRAVENOUS ONCE
Status: COMPLETED | OUTPATIENT
Start: 2021-04-05 | End: 2021-04-06

## 2021-04-05 RX ORDER — FENTANYL CITRATE 50 UG/ML
50-100 INJECTION, SOLUTION INTRAMUSCULAR; INTRAVENOUS
Status: DISCONTINUED | OUTPATIENT
Start: 2021-04-05 | End: 2021-04-07 | Stop reason: HOSPADM

## 2021-04-05 RX ORDER — SODIUM CHLORIDE, SODIUM LACTATE, POTASSIUM CHLORIDE, CALCIUM CHLORIDE 600; 310; 30; 20 MG/100ML; MG/100ML; MG/100ML; MG/100ML
INJECTION, SOLUTION INTRAVENOUS CONTINUOUS
Status: DISCONTINUED | OUTPATIENT
Start: 2021-04-05 | End: 2021-04-07 | Stop reason: HOSPADM

## 2021-04-05 RX ORDER — TERBUTALINE SULFATE 1 MG/ML
0.25 INJECTION, SOLUTION SUBCUTANEOUS
Status: DISCONTINUED | OUTPATIENT
Start: 2021-04-05 | End: 2021-04-07 | Stop reason: HOSPADM

## 2021-04-05 RX ORDER — METHYLERGONOVINE MALEATE 0.2 MG/ML
200 INJECTION INTRAVENOUS
Status: DISCONTINUED | OUTPATIENT
Start: 2021-04-05 | End: 2021-04-07 | Stop reason: HOSPADM

## 2021-04-05 RX ORDER — TRANEXAMIC ACID 10 MG/ML
1 INJECTION, SOLUTION INTRAVENOUS EVERY 30 MIN PRN
Status: DISCONTINUED | OUTPATIENT
Start: 2021-04-05 | End: 2021-04-07 | Stop reason: HOSPADM

## 2021-04-05 RX ORDER — MISOPROSTOL 100 UG/1
25 TABLET ORAL
Status: DISCONTINUED | OUTPATIENT
Start: 2021-04-05 | End: 2021-04-05

## 2021-04-05 RX ORDER — ASPIRIN 81 MG/1
81 TABLET, CHEWABLE ORAL DAILY
Status: ON HOLD | COMMUNITY
End: 2021-04-17

## 2021-04-05 RX ADMIN — DINOPROSTONE 10 MG: 10 INSERT VAGINAL at 22:09

## 2021-04-05 RX ADMIN — HYDROXYZINE HYDROCHLORIDE 100 MG: 50 TABLET, FILM COATED ORAL at 23:45

## 2021-04-05 ASSESSMENT — MIFFLIN-ST. JEOR: SCORE: 1775.08

## 2021-04-05 NOTE — PROGRESS NOTES
S: Feels well,  Baby active.  Denies uterine cramping, vaginal bleeding or leaking of fluid. No headache, increase in edema, no epigastric pain.     Reports having 1 contraction/ hour for the past 3-4 days. She was originally scheduled for IOL secondary to maternal obesity and suspected fetal macrosomia last week but ultimately did not come in as she desired to await spontaneous labor. We reviewed the R/B/A and indications for induction today, they desire induction starting this evening if possible.    Hx of anemia, was ordered to have iron transfusions but did not go. Has been taking oral iron     O: Vitals: BP 96/60 (BP Location: Left arm, Cuff Size: Adult Regular)   Wt 101.6 kg (224 lb)   LMP 07/10/2020 (Approximate)   BMI 38.45 kg/m    BMI= Body mass index is 38.45 kg/m .  Exam:  Constitutional: healthy, alert and no distress  Respiratory: respirations even and unlabored  Gastrointestinal: Abdomen soft, non-tender. Fundus measures appropriate for gestational age. Fetal heart tones hear without difficulty and within normal limits  : Normal external genitalia without lesions  Cephalic per BSUS   SVE: /-3  Aguirre 3  Psychiatric: mentation appears normal and affect normal/bright    A/P:  (O09.93) Supervision of high risk pregnancy in third trimester  (primary encounter diagnosis)  -Plan for IOL with cervical ripening this evening (pt and partner's request)   -COVID-19 will be done at time of admission  -Procedure for cervical ripening/ induction discussed with patient and her partner  -Indication for induction: maternal obesity and suspected fetal macrosomia   -R/B/A to this plan were discussed, unit and on-call CNM notified    (O36.63X0) Fetal macrosomia in third trimester, single or unspecified fetus  -Infant measuring in >99%ile on last growth ultrasound with MFM at 35 weeks.   -Extrapolated EFW <5000gm at 39 weeks, therefore not a candidate for      (O99.013) Anemia in pregnancy, third  trimester  -Hgb 9.0 at 37 weeks  -Was ordered to have IV iron transfusion but did not follow-up   -Has been taking oral iron  -Plan for repeat CBC on admission     (Z87.59) History of pre-eclampsia  -On prophylactic Asprin 81mg for preeclampsia prevention    VINOD Wilkins CNM 4/5/2021 5:00 PM

## 2021-04-05 NOTE — NURSING NOTE
"Chief Complaint   Patient presents with     Prenatal Care     39 weeks and 6 days       Initial BP 96/60 (BP Location: Left arm, Cuff Size: Adult Regular)   Wt 101.6 kg (224 lb)   LMP 07/10/2020 (Approximate)   BMI 38.45 kg/m   Estimated body mass index is 38.45 kg/m  as calculated from the following:    Height as of 3/2/21: 1.626 m (5' 4\").    Weight as of this encounter: 101.6 kg (224 lb).  BP completed using cuff size: regular    Questioned patient about current smoking habits.  Pt. has never smoked.                   "

## 2021-04-06 ENCOUNTER — ANESTHESIA (OUTPATIENT)
Dept: OBGYN | Facility: CLINIC | Age: 26
End: 2021-04-06
Payer: COMMERCIAL

## 2021-04-06 ENCOUNTER — ANESTHESIA EVENT (OUTPATIENT)
Dept: OBGYN | Facility: CLINIC | Age: 26
End: 2021-04-06
Payer: COMMERCIAL

## 2021-04-06 PROBLEM — O99.013 ANEMIA AFFECTING PREGNANCY IN THIRD TRIMESTER: Status: ACTIVE | Noted: 2021-04-06

## 2021-04-06 PROCEDURE — 250N000013 HC RX MED GY IP 250 OP 250 PS 637: Performed by: ADVANCED PRACTICE MIDWIFE

## 2021-04-06 PROCEDURE — 258N000003 HC RX IP 258 OP 636: Performed by: ADVANCED PRACTICE MIDWIFE

## 2021-04-06 PROCEDURE — 120N000001 HC R&B MED SURG/OB

## 2021-04-06 PROCEDURE — 370N000003 HC ANESTHESIA WARD SERVICE

## 2021-04-06 PROCEDURE — 3E033VJ INTRODUCTION OF OTHER HORMONE INTO PERIPHERAL VEIN, PERCUTANEOUS APPROACH: ICD-10-PCS | Performed by: ADVANCED PRACTICE MIDWIFE

## 2021-04-06 PROCEDURE — 3E0D7GC INTRODUCTION OF OTHER THERAPEUTIC SUBSTANCE INTO MOUTH AND PHARYNX, VIA NATURAL OR ARTIFICIAL OPENING: ICD-10-PCS | Performed by: ADVANCED PRACTICE MIDWIFE

## 2021-04-06 PROCEDURE — 250N000009 HC RX 250: Performed by: ADVANCED PRACTICE MIDWIFE

## 2021-04-06 PROCEDURE — 250N000011 HC RX IP 250 OP 636: Performed by: ADVANCED PRACTICE MIDWIFE

## 2021-04-06 PROCEDURE — 999N000011 HC STATISTIC ANESTHESIA CASE

## 2021-04-06 RX ORDER — OXYTOCIN/0.9 % SODIUM CHLORIDE 30/500 ML
1-24 PLASTIC BAG, INJECTION (ML) INTRAVENOUS CONTINUOUS
Status: DISCONTINUED | OUTPATIENT
Start: 2021-04-06 | End: 2021-04-07 | Stop reason: HOSPADM

## 2021-04-06 RX ORDER — NALBUPHINE HYDROCHLORIDE 10 MG/ML
2.5-5 INJECTION, SOLUTION INTRAMUSCULAR; INTRAVENOUS; SUBCUTANEOUS EVERY 6 HOURS PRN
Status: DISCONTINUED | OUTPATIENT
Start: 2021-04-06 | End: 2021-04-07

## 2021-04-06 RX ORDER — ONDANSETRON 2 MG/ML
4 INJECTION INTRAMUSCULAR; INTRAVENOUS EVERY 6 HOURS PRN
Status: DISCONTINUED | OUTPATIENT
Start: 2021-04-06 | End: 2021-04-07 | Stop reason: HOSPADM

## 2021-04-06 RX ORDER — LIDOCAINE HYDROCHLORIDE AND EPINEPHRINE 15; 5 MG/ML; UG/ML
3 INJECTION, SOLUTION EPIDURAL
Status: DISCONTINUED | OUTPATIENT
Start: 2021-04-06 | End: 2021-04-07 | Stop reason: HOSPADM

## 2021-04-06 RX ORDER — SODIUM CHLORIDE, SODIUM LACTATE, POTASSIUM CHLORIDE, CALCIUM CHLORIDE 600; 310; 30; 20 MG/100ML; MG/100ML; MG/100ML; MG/100ML
INJECTION, SOLUTION INTRAVENOUS CONTINUOUS
Status: DISCONTINUED | OUTPATIENT
Start: 2021-04-06 | End: 2021-04-07 | Stop reason: HOSPADM

## 2021-04-06 RX ORDER — MISOPROSTOL 100 UG/1
25 TABLET ORAL
Status: DISCONTINUED | OUTPATIENT
Start: 2021-04-06 | End: 2021-04-07 | Stop reason: HOSPADM

## 2021-04-06 RX ORDER — FENTANYL CITRATE-0.9 % NACL/PF 10 MCG/ML
100 PLASTIC BAG, INJECTION (ML) INTRAVENOUS EVERY 5 MIN PRN
Status: DISCONTINUED | OUTPATIENT
Start: 2021-04-06 | End: 2021-04-07

## 2021-04-06 RX ORDER — ONDANSETRON 4 MG/1
4 TABLET, ORALLY DISINTEGRATING ORAL EVERY 6 HOURS PRN
Status: DISCONTINUED | OUTPATIENT
Start: 2021-04-06 | End: 2021-04-07 | Stop reason: HOSPADM

## 2021-04-06 RX ADMIN — HYDROXYZINE HYDROCHLORIDE 100 MG: 50 TABLET, FILM COATED ORAL at 10:58

## 2021-04-06 RX ADMIN — Medication 25 MCG: at 14:37

## 2021-04-06 RX ADMIN — SODIUM CHLORIDE, POTASSIUM CHLORIDE, SODIUM LACTATE AND CALCIUM CHLORIDE: 600; 310; 30; 20 INJECTION, SOLUTION INTRAVENOUS at 22:30

## 2021-04-06 RX ADMIN — PENICILLIN G POTASSIUM 5 MILLION UNITS: 5000000 POWDER, FOR SOLUTION INTRAMUSCULAR; INTRAPLEURAL; INTRATHECAL; INTRAVENOUS at 22:45

## 2021-04-06 RX ADMIN — Medication 25 MCG: at 18:38

## 2021-04-06 RX ADMIN — Medication 2 MILLI-UNITS/MIN: at 22:51

## 2021-04-06 RX ADMIN — Medication 25 MCG: at 20:33

## 2021-04-06 RX ADMIN — Medication 25 MCG: at 10:37

## 2021-04-06 RX ADMIN — Medication 25 MCG: at 12:35

## 2021-04-06 RX ADMIN — Medication 25 MCG: at 16:37

## 2021-04-06 NOTE — PLAN OF CARE
Arrives for scheduled cervical ripening/induction, ESL declines . There is confusion initially regarding changing into a gown, not placed on monitor until 2010

## 2021-04-06 NOTE — PLAN OF CARE
Patient states she has been sleeping well and not feeling strong contractions.  Vitals done and patient states she will try to sleep more.  Infant is very active and it can be difficult to trace fetal heart rate.  Ultrasound readjusted.

## 2021-04-06 NOTE — H&P
[facility] Labor Admission History & Physical    Karen Loya is a 26 year old  with an IUP at 39w6d  Somalian; ,   Partner/support Person: Stephen  Language Barrier: English, Ivorian  Clinic: [facility]  Provider: Luis Fernando    Karen Loya is admitted to the Birthplace at [facility] on 2021 at 7:58 PM   Karen presents for induction of labor s/t maternal obesity Class II and suspected fetal macrosomia.     History of present inllness/Chief Complaint:    Here with: induction of labor secondary to Class II obesity, macrosomia  Patient reports contractions are None      Baby active Yes  Membranes are intact.  Bloody show No   Any changes with medical history since last prenatal visit No  Declines syphilis screening.  Neg x2 in pregnancy     Obstetrical history  Estimated Date of Delivery: 2021 determined by US at 8wks  Patient's last menstrual period was 07/10/2020 (approximate).   Dating U/S: 2020    Fetal anatomic survey: Normal, macrosomia   Placenta: posterior     PRENATAL COURSE  Prenatal care began at 12 wks gestation for a total of 11 prenatal visits.  Total wt gain 18; There is no height or weight on file to calculate BMI.  Prenatal Blood Pressure: WNL  Prenatal course was   complicated by    Patient Active Problem List    Diagnosis Date Noted     Supervision of high risk pregnancy in third trimester 2021     Priority: Medium       Clinic/Hospital: [facility]  Partner Name: Stephen  Ultrasound predicts sex: boy  Childrens names/ages: 20 month old boy  Previous labor experiences: IOL for preeclampsia,  of 7lb8oz boy   Waterbirth (interest, declined, ineligible): n/a  Level of education/occupation:   Pertinent History: Obesity, belkis. Macrosomia. Previous infant was suspected to be LGA and was AGA   PP contraception: Undecided   Hx PPD/Depression/Anxiety: Denies   Peds provider:    Plans for labor pain management: Epidural   Plans for post partum recovery/time off:  Feeding  preference: breast   Breast pump:   Car seat:  Circumcision:  Discussed 2 week visit:  Tdap:   Flu:         GBS (group B Streptococcus carrier), +RV culture, currently pregnant 03/15/2021     Priority: Medium     BMI 38.0-38.9,adult 2021     Priority: Medium     Fetal macrosomia in third trimester, single or unspecified fetus 2021     Priority: Medium     History of pre-eclampsia in prior pregnancy, currently pregnant 2020     Priority: Medium     Nausea/vomiting in pregnancy 2020     Priority: Medium     History of maternal third degree perineal laceration, currently pregnant 2020     Priority: Medium     Repair of partial third degree lac with first birth       Tdap: declined   Rhogam: O pos, not indicated     Patient Active Problem List   Diagnosis     History of pre-eclampsia in prior pregnancy, currently pregnant     Nausea/vomiting in pregnancy     History of maternal third degree perineal laceration, currently pregnant     BMI 38.0-38.9,adult     Fetal macrosomia in third trimester, single or unspecified fetus     GBS (group B Streptococcus carrier), +RV culture, currently pregnant     Supervision of high risk pregnancy in third trimester       HISTORY  No Known Allergies  Past Medical History:   Diagnosis Date     No pertinent past medical history 2020     Past Surgical History:   Procedure Laterality Date     NO HISTORY OF SURGERY  2020     No family history on file.  Social History     Tobacco Use     Smoking status: Never Smoker     Smokeless tobacco: Never Used   Substance Use Topics     Alcohol use: Never     Frequency: Never     OB History    Para Term  AB Living   2 1 1 0 0 1   SAB TAB Ectopic Multiple Live Births   0 0 0 0 1      # Outcome Date GA Lbr Robert/2nd Weight Sex Delivery Anes PTL Lv   2 Current            1 Term 19 40w0d  3.175 kg (7 lb) M Vag-Spont   [facility]      Birth Comments: partial 3rd degree lac, IOL for GHTN        LABS:  Lab  Results   Component Value Date    ABO O 10/21/2020    RH Pos 10/21/2020    AS Neg 10/21/2020    HGB 9.0 (L) 2021    HEPBANG Nonreactive 10/21/2020    CHPCRT Negative 10/21/2020    GCPCRT Negative 10/21/2020       GBS Status:   Lab Results   Component Value Date    GBS Positive (A) 2021     Rubella: Immune    HIV: Non-Reactive   Platelets:  205  1hr GCT:  99    ROS   Pt is alert and oriented  Pt denies significant constitutional symptoms including fever and/or malaise.    Pt denies significant respiratory, cardiovacular, GI, or muscular/skeletal complaints.    Neuro: Denies HA and visual changes  Muscoloskeletal: Denies except for discomforts r/t pregnancy     PHYSICAL EXAM:  LMP 07/10/2020 (Approximate)   General appearance:  healthy, alert and active   Heart: RRR  Lungs: CTA bilaterally, normal respiratory effort  Abdomen: gravid, single vertex fetus, non-tender, EFW 8-9 lbs.   Legs: reflexes 2+ bilaterally, no clonus, no edema     Contractions: Pt is tia in an irregular pattern    Fetal heart tones: Baseline 140   Variability: moderate   Accelerations: present  Decelerations: absent    NST: reactive    Cervix: 1/ 30%/ -3 per CNM in clinic today, Vtx  Bloody show: no  Membranes:  intact    ASSESSMENT:  26 year old  with santos IUP 39w6d for induction of labor.  Indication maternal class II  Obesity and suspected fetal macrosomia - EFW >99% at 35wks  NST reactive  GBS positive and membranes intact    PLAN:  Routine CNM care  Labs ordered  Teaching done r/t comfort measures, pain management options, and labor processes, and pt desires epidural once more uncomfortable   Discussed options for cervical ripening including oral or vaginal cytotec, or cervidil. Pt and her  desire to proceed with cervidil.   Admit - see IP orders  cervical ripening with cervidil  Theraputic sleep  Prophylactic antibiotic for + GBS status when in active labor  Anticipate     Adelina Fung CNM

## 2021-04-06 NOTE — PROGRESS NOTES
"CNM PROGRESS NOTE    SUBJECTIVE:  Karen resting in bed with Stephen at bedside when RN and CNM entered the room. Couple requesting Stephen interpret, decline professional . Karen very uncomfortable during this conversation, difficulty repositioning herself in bed, moving fetal monitors. Reports pain is constant, lower abdomen, not like labor contractions. Little sleep last night. Wondering if her cervix has changed, requesting SVE now rather than at 10:00. Wondering if it is time to get the epidural.    OBJECTIVE:  BP 93/53   Pulse 80   Temp 98.6  F (37  C) (Oral)   Resp 16   Ht 1.651 m (5' 5\")   Wt 103.4 kg (228 lb)   LMP 07/10/2020 (Approximate)   BMI 37.94 kg/m      Fetal heart tones: Baseline 140   Variability: moderate  Accelerations: present  Decelerations: absent    Contractions: Pt is tia in an irregular pattern    Cervix: 1 / 60 / -3 / post / med, Aguirre 4  ROM: not ruptured    Pitocin- none  Antibiotics- none  Cervical ripening: cervidil    ASSESSMENT:  IUP @ 40w0d, IOL for suspected fetal macrosomia & maternal BMI  Cat I FHR  GBS- positive  Membranes intact  Estimated fetal weight >99%ile  Anemia-- last hemoglobin 9.2  Asymptomatic covid positive  History of preeclampsia  History of 3rd degree perineal laceration    PLAN:   -Removed cervidil. Karen is very uncomfortable with VE, but reports almost immediate improvement of abdominal pain after cervidil removed. Discussed Aguirre score 4, further ripening needed. Recommended oral cytotec d/t intense vaginal pain with cervidil-- Karen and Stephen are in agreement with this plan. Cytotec ordered per protocol after cervidil removal.  -Discussion about r/b/a of antibiotics for GBS positive status. Karen initially declines because she remembers disliking the way this medication made her feel with her first delivery in [place], so wants to \"wait and see\" if antibiotics are needed. After discussing route of administration, goal of antibiotics " 4 hours prior to delivery, Karen and Stephen are in agreement with prophylactic antibiotics for GBS. Plan to start during active labor or with pitocin administration. Chart review-- Karen was GBS negative with her first delivery, but felt ill with expected side effects d/t magnesium administration for preeclampsia with severe features.  -Discussed low hemoglobin, goal to prevent postpartum hemorrhage or harm to Karen with blood loss. Accept AMTSL-- will plan for TXA administration prior to delivery and pitocin after delivery of the baby  -Discussed possible fetal macrosomia, goals for delivery, possible need for physician back up if shoulder dystocia. Karen strongly desires a female physician if possible. Will update Dr. Lomas [facility] physician on-call today.  -Discussed code status-- ordered full code  -Comfort measures PRN-- declines shower now, will try to order some food from out of the hospital  -Pain medication PRN-- declines fentanyl for now, will likely get epidural when further along in her labor  -Reevaluate this afternoon, sooner PRN  -Anticipate LAXMI Meadows CNM

## 2021-04-06 NOTE — PROVIDER NOTIFICATION
04/06/21 0400   Provider Notification   Provider Name/Title Adelina MEADE   Method of Notification In Department   Request Evaluate - Remote   Notification Reason Status Update     Adelina MEADE present in department.  Updated on fetal monitor strip and occasional difficulty monitoring fetal heart rate.  Majority of time the fetal monitor strip is category 1.  Patient is sleeping comfortably.

## 2021-04-06 NOTE — PLAN OF CARE
Covid +, education done re masking, spoke with spouse Stephen re: hospital rules/not able to leave/ masking etc.    Pt is moving monitors off and out of placement, difficulty getting a FHT strip. I had already loosened belts and educated regarding necessity for monitoring. After cervidil placement, I sat at bedside to get better strip and assist pt in getting comfortable- pt sidelying with extra pillows and blankets for padding/comfort. May suggest raudel

## 2021-04-06 NOTE — PLAN OF CARE
Difficulty with IV start, flying squad paged. Pt states she has a hx of difficulty. Lab will collect labs while waiting.    Will delay cervidil placement due to unit/staffing, pt and CNM aware.    Naso-phayngeal Covid swab collected and sent despite difficulty. Pt education done but pt kept grabbing my hand and pushing me away. Repeated swab after 2 attempts and was able to get swab specimen collected/sent

## 2021-04-06 NOTE — PROVIDER NOTIFICATION
CNM at bedside for SVE. Cervidil removed. Little change in cervical exam, orders for oral Cytotec. Ok for pt to eat breakfast.

## 2021-04-06 NOTE — PLAN OF CARE
Assessment completed and informed pt to call if rupture of membranes, vaginal bleeding, contractions increasing in strength or frequency, or any other concerns.

## 2021-04-07 ENCOUNTER — APPOINTMENT (OUTPATIENT)
Dept: ULTRASOUND IMAGING | Facility: CLINIC | Age: 26
End: 2021-04-07
Attending: ADVANCED PRACTICE MIDWIFE
Payer: COMMERCIAL

## 2021-04-07 VITALS
BODY MASS INDEX: 37.99 KG/M2 | HEART RATE: 78 BPM | RESPIRATION RATE: 16 BRPM | DIASTOLIC BLOOD PRESSURE: 61 MMHG | HEIGHT: 65 IN | SYSTOLIC BLOOD PRESSURE: 116 MMHG | TEMPERATURE: 98.1 F | WEIGHT: 228 LBS

## 2021-04-07 LAB
ABO + RH BLD: NORMAL
ABO + RH BLD: NORMAL
BLD GP AB SCN SERPL QL: NORMAL
BLOOD BANK CMNT PATIENT-IMP: NORMAL
SPECIMEN EXP DATE BLD: NORMAL

## 2021-04-07 PROCEDURE — 250N000009 HC RX 250: Performed by: ADVANCED PRACTICE MIDWIFE

## 2021-04-07 PROCEDURE — 76819 FETAL BIOPHYS PROFIL W/O NST: CPT

## 2021-04-07 PROCEDURE — 36415 COLL VENOUS BLD VENIPUNCTURE: CPT | Performed by: OBSTETRICS & GYNECOLOGY

## 2021-04-07 PROCEDURE — 86850 RBC ANTIBODY SCREEN: CPT | Performed by: OBSTETRICS & GYNECOLOGY

## 2021-04-07 PROCEDURE — 86900 BLOOD TYPING SEROLOGIC ABO: CPT | Performed by: OBSTETRICS & GYNECOLOGY

## 2021-04-07 PROCEDURE — 250N000011 HC RX IP 250 OP 636: Performed by: ADVANCED PRACTICE MIDWIFE

## 2021-04-07 PROCEDURE — 258N000003 HC RX IP 258 OP 636: Performed by: ADVANCED PRACTICE MIDWIFE

## 2021-04-07 PROCEDURE — 76816 OB US FOLLOW-UP PER FETUS: CPT

## 2021-04-07 PROCEDURE — 86901 BLOOD TYPING SEROLOGIC RH(D): CPT | Performed by: OBSTETRICS & GYNECOLOGY

## 2021-04-07 RX ORDER — NALBUPHINE HYDROCHLORIDE 10 MG/ML
2.5-5 INJECTION, SOLUTION INTRAMUSCULAR; INTRAVENOUS; SUBCUTANEOUS EVERY 6 HOURS PRN
Status: DISCONTINUED | OUTPATIENT
Start: 2021-04-07 | End: 2021-04-07 | Stop reason: HOSPADM

## 2021-04-07 RX ORDER — FENTANYL CITRATE-0.9 % NACL/PF 10 MCG/ML
100 PLASTIC BAG, INJECTION (ML) INTRAVENOUS EVERY 5 MIN PRN
Status: DISCONTINUED | OUTPATIENT
Start: 2021-04-07 | End: 2021-04-07 | Stop reason: HOSPADM

## 2021-04-07 RX ORDER — GINSENG 100 MG
CAPSULE ORAL 3 TIMES DAILY PRN
Status: DISCONTINUED | OUTPATIENT
Start: 2021-04-07 | End: 2021-04-07 | Stop reason: HOSPADM

## 2021-04-07 RX ORDER — SCOLOPAMINE TRANSDERMAL SYSTEM 1 MG/1
1 PATCH, EXTENDED RELEASE TRANSDERMAL
Status: DISCONTINUED | OUTPATIENT
Start: 2021-04-07 | End: 2021-04-07 | Stop reason: HOSPADM

## 2021-04-07 RX ADMIN — SODIUM CHLORIDE 2.5 MILLION UNITS: 9 INJECTION, SOLUTION INTRAVENOUS at 06:49

## 2021-04-07 RX ADMIN — SODIUM CHLORIDE 2.5 MILLION UNITS: 9 INJECTION, SOLUTION INTRAVENOUS at 02:45

## 2021-04-07 RX ADMIN — Medication 4 MILLI-UNITS/MIN: at 00:10

## 2021-04-07 NOTE — PROVIDER NOTIFICATION
"CNM and RN at bedside with  ipad to discuss POC.  section recommended based on FHT. Pt and spouse refusing  despite a very thorough discussion of risks including fetal demise and long term complications. Pt and spouse verbalize that they understand the risks but continue to decline medical advice stating the outcome \"is in God's hands.\" Pt requesting to leave AMA but eventually agreed to stay for monitoring. Refusing antibiotics for GBS or any other medications at this time. Will continue to monitor and discuss POC later this afternoon.   "

## 2021-04-07 NOTE — PROGRESS NOTES
"Notified that pt has had persistent late decelerations and fetal tachycardia since Pitocin was turned off at 10:45.     CNM PROGRESS NOTE    SUBJECTIVE: Not feeling contractions, Stephen at bedside.     OBJECTIVE:  /54   Pulse 78   Temp 98.1  F (36.7  C) (Oral)   Resp 16   Ht 1.651 m (5' 5\")   Wt 103.4 kg (228 lb)   LMP 07/10/2020 (Approximate)   BMI 37.94 kg/m      Fetal heart tones: Baseline 160s, periods of baseline 170s   Variability: moderate, periods of minimal variability   Accelerations: absent  Decelerations: present, late decelerations, recurrent late decels form 10:25-11:50, intermittent late declarations after that   At present: baseline 150s with minimal variability    Contractions: Pt is tia in an irregular pattern, not feeling them    Cervix: Not assessed     Pitocin- none  Antibiotics- PCN complete  Cervical ripening: s/p 6 doses of misoprostol    ASSESSMENT:  IUP @ 40w1d, not in labor     IOL for maternal BMI and suspected fetal macrosomia    GBS- positive, adequately treated   Membranes intact  FHR Category II  EFW >99%ile on ultrasound   COVID-19 positive, asymptomatic   Hx of PEC   Hx of third degree laceration with 7lb8oz baby     PLAN:   Plan of care discussed with Dr. Salguero who recommends proceeding to primary  in the setting of fetal intolerance of labor.      Lengthy discussion regarding plan of care with Karen and Stephen via . Reviewed that she has not had pitocin for over 2 hours and infant is continuing to display signs of fetal distress including tachycardia, and late decelerations and at this time a  delivery is safest for her baby. Discussed risks of not doing a  including risk of fetal death, permanent complications and brain damage. Karen is adamantly declining surgery. She and Stephen voice understanding of the risks associated with refusing this recommendation. They were originally planning to leave [facility] but have agreed to stay for " continued FHR monitoring.  Will plan continued fetal monitoring for now and reassess at short interval.    IVNOD Wilkins CNM 4/7/2021 1:42 PM

## 2021-04-07 NOTE — PROGRESS NOTES
"CNM PROGRESS NOTE    SUBJECTIVE:  Continues to sleep between cares. RN helping to alternate R & L lateral positions.    OBJECTIVE:  /66   Pulse 80   Temp 98.1  F (36.7  C) (Oral)   Resp 16   Ht 1.651 m (5' 5\")   Wt 103.4 kg (228 lb)   LMP 07/10/2020 (Approximate)   BMI 37.94 kg/m      Fetal heart tones: Baseline 145   Variability: moderate  Accelerations: present  Decelerations: absent    Currently category I, has been intermittently cat II with moderate variability and variable and late decelerations over the last 6 hours    Contractions: Pt is tia every 1-5 minutes, lasting 40-60 seconds    Cervix: deferred  ROM: not ruptured    Pitocin- 2 mu/min.  Antibiotics- PCN, s/p 2 doses  Cervical ripening: s/p 6 doses oral cytotec and 11 hours of cervidil    ASSESSMENT:  IUP @ 40w0d, IOL for suspected fetal macrosomia & maternal BMI  Cat I FHR  GBS- positive, adequately treated  Membranes intact  Estimated fetal weight >99%ile  Anemia-- last hemoglobin 9.2  Asymptomatic covid positive  History of preeclampsia  History of 3rd degree perineal laceration    PLAN:   -Continue intrauterine resuscitation for Cat II tracing PRN  -Pitocin reduced to 2 mU/min after recurrent decelerations. If tracing continues Cat I, titrate pitocin per protocol. Plan to offer AROM when appropriate. If unable to titrate pitocin higher overnight and no cervical change, plan to consult IHOB.  -Continue prophylactic abx for GBS per protocol  -TXA prior to delivery and pitocin after for AMTSL. 2nd IV in place  -Plan for in-house OBGYN Dr. Lomas to be at desk near the time of delivery for backup-- she plans to alert the next in-house OBGYN in the morning if Karen has not delivered by that time.  -Comfort measures PRN, pain medications as requested  -Reevaluate in 3-4 hours  -Anticipate progress    LAXMI Beyer CNM      "

## 2021-04-07 NOTE — PROGRESS NOTES
"CNM PROGRESS NOTE    SUBJECTIVE:  CNM called back to room after deep variable deceleration to 90 bmp, lasting 60 seconds. Stephen at bedside, interpreting per family preference. Karen resting between cares-- currently R lateral position. Reports pain is not too bad yet-- still not as intense as this morning.    OBJECTIVE:  /66   Pulse 80   Temp 98.1  F (36.7  C) (Oral)   Resp 16   Ht 1.651 m (5' 5\")   Wt 103.4 kg (228 lb)   LMP 07/10/2020 (Approximate)   BMI 37.94 kg/m      Fetal heart tones: Baseline 150   Variability: moderate  Accelerations: present  Decelerations: present-- variable, tena to 90 bmp, lasting 60 seconds    FHR intermittently minimal variability and recurrent decelerations over the last 2 hours-- interventions include repositioning to lateral positions.    Contractions: Pt is tia every 1-4 minutes, lasting 40-60 seconds    Cervix: recheck deferred  ROM: not ruptured    Pitocin- 4 mu/min.  Antibiotics- PCN, s/p 1 dose  Cervical ripening: s/p 6 doses oral cytotec and 11 hours of cervidil    ASSESSMENT:  IUP @ 40w0d, IOL for suspected fetal macrosomia & maternal BMI  Cat II FHR with moderate variability  GBS- positive  Membranes intact  Estimated fetal weight >99%ile  Anemia-- last hemoglobin 9.2  Asymptomatic covid positive  History of preeclampsia  History of 3rd degree perineal laceration    PLAN:   -Discussed with Karen and Stephen Cat II tracing remote from delivery, but currently moderate variability, deceleration did not reoccur with current position. Encouraged Karen to continue to rest in lateral positions, will monitor closely and consult with IHOB as needed.  -Continue intrauterine resuscitation for Cat II tracing PRN  -Continue titrating pitocin per protocol. Plan to offer AROM when appropriate.  -Continue prophylactic abx for GBS per protocol  -TXA prior to delivery and pitocin after for AMTSL. 2nd IV in place  -Plan for in-house OBGYN Dr. Lomas to be at desk near the " time of delivery for backup-- she plans to alert the next in-house OBGYN in the morning if Karen has not delivered by that time.  -Comfort measures PRN, pain medications as requested  -Reevaluate in 3-4 hours  -Anticipate progress    LAXMI Beyer CNM

## 2021-04-07 NOTE — PROGRESS NOTES
"CNM PROGRESS NOTE    SUBJECTIVE: Feeling well, not feeling contractions     OBJECTIVE:  /70   Pulse 78   Temp 98  F (36.7  C) (Oral)   Resp 16   Ht 1.651 m (5' 5\")   Wt 103.4 kg (228 lb)   LMP 07/10/2020 (Approximate)   BMI 37.94 kg/m      Fetal heart tones: Baseline 135   Variability: moderate   Accelerations: present  Decelerations: absent  Since 3pm FHR tracing has been category I     Contractions: Pt is tia in an irregular pattern, not feeling them     Cervix: declines cervical exam  ROM: not ruptured    Pitocin- none  Antibiotics- PCN complete   Cervical ripening: s/p 6 doses of misoprostol    ASSESSMENT:  IUP @ 40w1d IOL for maternal BMI and suspected fetal macrosomia    GBS- positive, adequately treated   Membranes intact  FHR Category I for past 2 hours   EFW >99%ile on ultrasound   COVID-19 positive, asymptomatic   Anemia, hgb on admission was 9.2  Hx of PEC   Hx of third degree laceration       PLAN:   -Reviewed plan of care with Radha. FHR tracing has been category I for the past 2 hours. Discussed option to proceed with induction, recognizing that if baby does not tolerate contractions, we will again recommend a  delivery. They continue to report they will decline  if recommended at that time.   -Also discussed the option to have a BPP/growth ultrasound today and if reassuring she can be discharged home with a plan to return for induction tomorrow. They elect to do a BPP/growth today with discharge home if reassuring.   -We reviewed concern that fetus was not tolerating contractions which is usually related to poor placental profusion, and it is unlikely this will change or improve with time.   -Reviewed that if she does start having contractions at home, she should return to the hospital early for fetal monitoring.  -R/B/A to this plan of care were discussed.   -Plan reviewed with Dr. Salguero, on call ROME Wilkins CNM 2021 6:09 PM    "

## 2021-04-07 NOTE — PROVIDER NOTIFICATION
04/06/21 2307   Fetal Assessment   Fetal Movement active   Fetal HR Assessment Method raudel   Fetal HR (beats/min) 155   Fetal Heart Baseline Rate normal range   Fetal HR Variability moderate (amplitude range 6 to 25 bpm)   Fetal HR Accelerations present   Fetal HR Decelerations other (see comments)  (see note)   CNM remains on unit and aware of EFM tracing.  Review of strip and discussed POC. Discussion with CNM agreeing that EFM tracing has sections that are indeterminate.  Pt repositioned to L side.  CNM will remain on unit, Pitocin will stay at current dose and we will monitor closely.

## 2021-04-07 NOTE — PROVIDER NOTIFICATION
04/07/21 0020   Fetal Assessment   Fetal Movement active   Fetal HR Assessment Method raudel   Fetal HR (beats/min) 150   Fetal Heart Baseline Rate normal range   Fetal HR Variability moderate (amplitude range 6 to 25 bpm)   Fetal HR Accelerations absent   Fetal HR Decelerations other (see comments)  (see note)   variability remains moderate, with a mixture of variable and late decels with contractions. CNM remains on unit and is aware of EFM tracing.

## 2021-04-07 NOTE — PROGRESS NOTES
"CNM PROGRESS NOTE    SUBJECTIVE:  Karen is walking around the room, talking on the phone when RN & CNM enter. Stephen interpreting per family preference. Reports she is much more comfortable today with cervidil out and with Rachelle cordless monitor for fetal monitoring. Able to eat some takeout food, family have dropped things off. Per Stephen \"We were just more prepared this time, we knew what to expect since it's our second baby.\"    OBJECTIVE:  /66   Pulse 80   Temp 98.1  F (36.7  C) (Oral)   Resp 16   Ht 1.651 m (5' 5\")   Wt 103.4 kg (228 lb)   LMP 07/10/2020 (Approximate)   BMI 37.94 kg/m      Fetal heart tones: Baseline 145   Variability: moderate  Accelerations: present  Decelerations: absent    Contractions: Pt is tia in an irregular pattern, reports no pain    Cervix: 2 / 80 / -3 / med / posterior, Aguirre score 5  ROM: not ruptured    Pitocin- none  Antibiotics- none  Cervical ripening: misoprostol, s/p 6 doses (1037, 1235, 1437, 1637, 1838, 2033)    ASSESSMENT:  IUP @ 40w0d, IOL for suspected fetal macrosomia & maternal BMI  Cat I FHR  GBS- positive  Membranes intact  Estimated fetal weight >99%ile  Anemia-- last hemoglobin 9.2  Asymptomatic covid positive  History of preeclampsia  History of 3rd degree perineal laceration    PLAN:   -Explained again the difference between medications to induce labor, to provide protection against GBS for the , for pain management (optional), and to prevent hemorrhage. Stephen and Karen in agreement with these interventions.  -Discussed Aguirre score of 5, reasonable to start pitocin to continue IOL. Will order now, RN to start following last cytotec dose per protocol. Plan to offer AROM when appropriate.  -Start prophylactic abx for GBS following pitocin administration  -TXA prior to delivery and pitocin after for AMTSL. RN to place 2nd IV sometime this evening.  -Plan for in-house OBGYN Dr. Lomas to be at desk near the time of delivery for backup-- she " plans to alert the next in-house OBGYN in the morning if Karen has not delivered by that time.  -Comfort measures PRN, pain medications as requested  -Reevaluate in 3-4 hours  -Anticipate     LAXMI Beyer CNM

## 2021-04-07 NOTE — PROVIDER NOTIFICATION
04/07/21 0040   Fetal Assessment   Fetal Movement active   Fetal HR Assessment Method raudel   Fetal HR (beats/min) 150   Fetal Heart Baseline Rate normal range   Fetal HR Variability other (see comments)  (see note)   Difficult to determine between occasional marked variability and decels for from 4199-0922. CNM has reviewed strip.

## 2021-04-07 NOTE — PROVIDER NOTIFICATION
Orders to discontinue pitocin and monitor for cat 1 tracing. Pt will be discharged to home after reactive tracing per CNM and consult with OB. Will be back for a 0730 induction in the morning.

## 2021-04-07 NOTE — PLAN OF CARE
Pt up to BR and repositioned to L side, Pitocin reduced to 2 mU.  After repositioning, CNM reviewed EFM tracing and concurred that  baseline was 150 with accels to 170  Will continue to monitor closely

## 2021-04-07 NOTE — PROVIDER NOTIFICATION
04/06/21 1915   Provider Notification   Provider Name/Title Tasneem MEADE   Method of Notification In Department    Discussed and  reviewed  with HALINA.   No changes at this time

## 2021-04-07 NOTE — PROVIDER NOTIFICATION
04/06/21 2300   Fetal Assessment   Fetal Movement active   Fetal HR Assessment Method raudel   Fetal HR (beats/min)   (see note)   starting at 2350, unable to determine baseline, questioning marked variability vs change in baseline and decels.  CNM on unit, is aware of tracing and concurs.

## 2021-04-07 NOTE — PLAN OF CARE
Bedside report received from Nazia VILLA.  Reviewed POC with pt with spouse interpreting.  Pt denies discomfort at this time and reports resting quietly.  States she understands POC and will call with and concerns, leaking of fluid, increase in pain, vaginal bleeding.

## 2021-04-07 NOTE — PROGRESS NOTES
"CNM PROGRESS NOTE    SUBJECTIVE:  Pt has been able to sleep overnight. Pitocin was restarted this morning with a category I tracing, currently on 10 of pitocin and feeling some cramping, mild contractions. Discussed option to use an  to adequately discuss plan of care with Karen and Stephen, they both decline, would like Stephen to translate.       OBJECTIVE:  /54   Pulse 78   Temp 98.1  F (36.7  C) (Oral)   Resp 16   Ht 1.651 m (5' 5\")   Wt 103.4 kg (228 lb)   LMP 07/10/2020 (Approximate)   BMI 37.94 kg/m      Fetal heart tones: Baseline 150   Variability: moderate  Accelerations: present  Decelerations: present, intermittent variable and late decelerations    Contractions: Pt is tia in an irregular pattern, every 2-7 minutes     Cervix: 2/ 50%/ -3, Vtx  ROM: not ruptured    Pitocin- 10 mu/min.  Antibiotics- PCN complete, s/p 3 doses   Cervical ripening: s/p 6 doses of misoprostol     ASSESSMENT:  IUP @ 40w1d IOL for maternal BMI and suspected fetal macrosomia    GBS- positive, adequately treated   Membranes intact  FHR Category II for past 20 minutes   EFW >99%ile on ultrasound   COVID-19 positive, asymptomatic   Hx of PEC   Hx of third degree laceration with 7lb8oz baby      PLAN:   Given minimal progress so far and fetus not tolerating pitocin well, we discussed the following options for care:   -Continue with induction as planned, will titrate pitocin per protocol and as tolerated by fetus and consider AROM when safe and appropriate to do so.   -Reviewed option for elective PCS in the setting of hx of third degree laceration with first delivery. Reviewed sequelae of 3rd or 4th degree laceration if it were to occur with this labor including long term risk of pain, fecal incontinence. We also discussed that given this is suspected to be a larger baby than first, 3rd and 4th degree lacerations are a risk with this labor.  -Discussed option to stop induction now, if FHR is category I " without Pitocin, she could consider going home for 24 hours and returning tomorrow for induction of labor.   -R/B/A to all options were discussed. Karen and Stephen elect to go home now and will return for IOL tomorrow morning.   -Reviewed s/sx of labor, fetal movement monitoring, warning s/sx to report.     VINOD Wilkins CNM 4/7/2021 11:39 AM

## 2021-04-07 NOTE — PROVIDER NOTIFICATION
Dr Salguero in department and reviewed tracing. Pt having late decels and tachy. Will review tracing with CNM and consider new POC.

## 2021-04-07 NOTE — PROVIDER NOTIFICATION
04/06/21 2230   Fetal Assessment   Fetal Movement active   Fetal HR Assessment Method raudel   Fetal HR (beats/min) 150   Fetal Heart Baseline Rate normal range   Fetal HR Variability other (see comments)  (unable to determine between marked variability and decels)   Fetal HR Accelerations other (see comments)  (unable to determine)   Fetal HR Decelerations other (see comments)  (unable to deterine)   Unable to determine between marked variability and acces and decels on FHR tracing.  CNM remains on unit and is aware of tracing.

## 2021-04-07 NOTE — PROVIDER NOTIFICATION
CNM at bedside for SVE and strip review. No new orders, will continue to increase pitocin as needed/able.

## 2021-04-07 NOTE — PROVIDER NOTIFICATION
04/07/21 0100   Fetal Assessment   Fetal Movement active   Fetal HR Assessment Method raudel   Fetal HR (beats/min) 145   Fetal Heart Baseline Rate normal range   Fetal HR Variability other (see comments)  (see note)   continues with short periods of questionable marked variability or variable decels.

## 2021-04-08 ENCOUNTER — TELEPHONE (OUTPATIENT)
Dept: OBGYN | Facility: CLINIC | Age: 26
End: 2021-04-08

## 2021-04-08 ENCOUNTER — PATIENT OUTREACH (OUTPATIENT)
Dept: CARE COORDINATION | Facility: CLINIC | Age: 26
End: 2021-04-08

## 2021-04-08 DIAGNOSIS — U07.1 2019 NOVEL CORONAVIRUS DISEASE (COVID-19): Primary | ICD-10-CM

## 2021-04-08 NOTE — PROGRESS NOTES
Clinic Care Coordination Contact  Community Health Worker Initial Outreach       The CHW received a referral for CCC. The CHW reviewed the chart and the patient is enrolled in CCC. The CHW will send the encounter to the Lead Coordinator to review the chart.

## 2021-04-08 NOTE — DISCHARGE INSTRUCTIONS
Discharge Instruction for Undelivered Patients      You were seen for: Labor Assessment  We Consulted: HALINA Rojo  You had (Test or Medicine): induction,  Fetal monitoring, Biophysical Profile    Diet:   Drink 8 to 12 glasses of liquids (milk, juice, water) every day.  You may eat meals and snacks.     Activity:  Call your doctor or nurse midwife if your baby is moving less than usual.     Call your provider if you notice:  Swelling in your face or increased swelling in your hands or legs.  Headaches that are not relieved by Tylenol (acetaminophen).  Changes in your vision (blurring: seeing spots or stars.)  Nausea (sick to your stomach) and vomiting (throwing up).   Weight gain of 5 pounds or more per week.  Heartburn that doesn't go away.  Signs of bladder infection: pain when you urinate (use the toilet), need to go more often and more urgently.  The bag of boles (rupture of membranes) breaks, or you notice leaking in your underwear.  Bright red blood in your underwear.  Abdominal (lower belly) or stomach pain.  Second (plus) baby: Contractions (tightening) less than 10 minutes apart and getting stronger.  Increase or change in vaginal discharge (note the color and amount)    Call Labor and Delivery in the morning induction time.  [phone]

## 2021-04-08 NOTE — TELEPHONE ENCOUNTER
Attempted to call patient x 2 through [facility]  regarding her cancelling her scheduled induction today. Left message on voice mail for patient to call us back.      LAXMI Sanchez, CNM

## 2021-04-09 ENCOUNTER — TELEPHONE (OUTPATIENT)
Dept: OBGYN | Facility: CLINIC | Age: 26
End: 2021-04-09

## 2021-04-09 ENCOUNTER — TRANSCRIBE ORDERS (OUTPATIENT)
Dept: MATERNAL FETAL MEDICINE | Facility: CLINIC | Age: 26
End: 2021-04-09

## 2021-04-09 ENCOUNTER — ANCILLARY PROCEDURE (OUTPATIENT)
Dept: ULTRASOUND IMAGING | Facility: CLINIC | Age: 26
End: 2021-04-09
Attending: ADVANCED PRACTICE MIDWIFE
Payer: COMMERCIAL

## 2021-04-09 ENCOUNTER — PRENATAL OFFICE VISIT (OUTPATIENT)
Dept: OBGYN | Facility: CLINIC | Age: 26
End: 2021-04-09
Payer: COMMERCIAL

## 2021-04-09 VITALS — SYSTOLIC BLOOD PRESSURE: 90 MMHG | DIASTOLIC BLOOD PRESSURE: 60 MMHG | WEIGHT: 224 LBS | BODY MASS INDEX: 37.28 KG/M2

## 2021-04-09 DIAGNOSIS — O98.513 COVID-19 AFFECTING PREGNANCY IN THIRD TRIMESTER: ICD-10-CM

## 2021-04-09 DIAGNOSIS — O36.60X0: ICD-10-CM

## 2021-04-09 DIAGNOSIS — O26.90 PREGNANCY RELATED CONDITION, ANTEPARTUM: Primary | ICD-10-CM

## 2021-04-09 DIAGNOSIS — O99.210 MATERNAL OBESITY AFFECTING PREGNANCY, ANTEPARTUM: ICD-10-CM

## 2021-04-09 DIAGNOSIS — O36.60X0: Primary | ICD-10-CM

## 2021-04-09 DIAGNOSIS — U07.1 COVID-19 AFFECTING PREGNANCY IN THIRD TRIMESTER: ICD-10-CM

## 2021-04-09 DIAGNOSIS — O09.93 SUPERVISION OF HIGH RISK PREGNANCY IN THIRD TRIMESTER: Primary | ICD-10-CM

## 2021-04-09 PROCEDURE — 99207 PR PRENATAL VISIT: CPT | Performed by: ADVANCED PRACTICE MIDWIFE

## 2021-04-09 PROCEDURE — 76819 FETAL BIOPHYS PROFIL W/O NST: CPT | Performed by: OBSTETRICS & GYNECOLOGY

## 2021-04-09 NOTE — NURSING NOTE
"Chief Complaint   Patient presents with     Prenatal Care     40 3/7 WEEKS       Initial BP 90/60   Wt 101.6 kg (224 lb)   LMP 07/10/2020 (Approximate)   BMI 37.28 kg/m   Estimated body mass index is 37.28 kg/m  as calculated from the following:    Height as of 21: 1.651 m (5' 5\").    Weight as of this encounter: 101.6 kg (224 lb).  BP completed using cuff size: large    Questioned patient about current smoking habits.  Pt. has never smoked.          The following HM Due: NONE    +fetal movement  -swelling    Paige Diallo, CMA      "

## 2021-04-09 NOTE — TELEPHONE ENCOUNTER
Per Adelina, pt to have a BPP today and also an NST.  Pt is schedule for a BPP at the BV clinic at 240. She will have the NST after with Adelina.     Please page Adelina once the pt is hooked up to the NST.      Pt advised of appts via [place] .      Raina MCCOY RN

## 2021-04-09 NOTE — PROGRESS NOTES
S: Feels well,  Baby active.  Denies uterine cramping, vaginal bleeding or leaking of fluid. No headache, increase in edema, no epigastric pain.     Karen presents today for  testing.   She was admitted to [facility] on 2012 for induction of labor due to maternal obesity and suspected fetal macrosomia (EFW >99% at 35wks).   She underwent cervical ripening with cervidil and 6 doses of PO cytotec. She was started on pitocin late on 2021, at which point persistent category II tracings were noted on the morning of 2021.   It was recommended she proceed with PLTCS for fetal intolerance of labor remote from delivery, however Karen and her  declined. After pitocin was turned off, FHTs returned to Cat I. She had a BPP at that point which was . She discharged home with plan to return for IOL the next day on , but did not show up.     Karen and her  present today for discussion of IOL and  testing given cat II tracing in labor and delivery.     BPP   NST reactive - baseline 140s, moderate variability, accelerations present, one non-significant variable deceleration, irregular contractions  NST placed in scanning  Cat I fetal tracing, reassuring fetal status      O: Vitals: BP 90/60   Wt 101.6 kg (224 lb)   LMP 07/10/2020 (Approximate)   BMI 37.28 kg/m    BMI= Body mass index is 37.28 kg/m .  Exam:  Constitutional: healthy, alert and no distress   Respiratory: respirations even and unlabored  Gastrointestinal: Abdomen soft, non-tender. Fundus measures appropriate for gestational age. Fetal heart tones hear without difficulty and within normal limits  : Deferred  Psychiatric: mentation appears normal and affect normal/bright    A/P:  1. (O09.93) Supervision of high risk pregnancy in third trimester  (primary encounter diagnosis)      2. (O36.63X0) Fetal macrosomia in third trimester, single or unspecified fetus  - EFW was 3983g (8lb 12oz) at 40w1d on   - GCT 99    3.  (O99.210) Maternal obesity affecting pregnancy, antepartum  - BMI 37    4. (O98.513,  U07.1) COVID-19 affecting pregnancy in third trimester  - Tested positive for COVID-19 on admission 4/5, asymptomatic     Again recommended induction of labor due to maternal obesity, suspected fetal macrosomia, and category II tracings during her induction 4/5-4/7.   Reviewed fetal status is reassuring today, however our recommendation is still to induce labor as there is a concern for decreased utero-placental sufficiency given the category II tracings she had in the hospital. Discussed possible risk stillbirth, and risk excessive fetal weight at time of delivery leading to operative delivery, dysfunctional labor, or shoulder dystocia.     She and her  state full understandings of their risks and continue to decline induction of labor.   They agree to twice weekly testing with MFM until delivery.   On call MD made aware of plan of care.     Labor signs and symptoms discussed, aware of numbers to call.  CNM pager number reviewed  Handouts given on labor precautions, fetal kick counts discussed at length, post dates recommendations reiterated   Encouraged patient to call with any questions or concerns.    Return to clinic on 4/13 and 4/16 for BPP and NST with M    LAXMI Tamez, HALINA

## 2021-04-09 NOTE — PATIENT INSTRUCTIONS
Post Dates Management    If you've gone beyond your due date you are probably thinking when is this baby coming!  Most babies are born on or after their due date so it is nothing to worry about.    If you are pregnant at 41 weeks we will start some increased monitoring to make sure that your baby and the placenta are healthy enough to continue your pregnancy.      We would have you come to the clinic every 3-4 days to be assessed with an ultrasound called a Biophysical profile (BPP).       This tells us how your baby is doing. We monitor fetal movement, breathing patterns, amniotic fluid level, and heart rate.     Research has shown that by 42 weeks gestation the placenta is not always healthy enough to support the pregnancy further and it is better for the baby to be born.  If you are still pregnant by 41 and a half weeks we will discuss induction of labor with you and the different options available.   Fetal Kick Counts    It is important to know when your baby's movements occur. We often get busy with work and life and do not pay close attention to their movements.        Women typically begin feeling movement between 18-22 weeks of gestation, sometimes it can be earlier or later depending on where your placenta is       Movements usually begin feeling like popping or fluttering and as the baby grows they become more pronounced    Toward the end of pregnancy as the baby gets larger they may not move as much or make as big of movements. Babies have maturing sleep cycles as well as not as much room to move and flip. If you are ever concerned about your baby's movements or have not felt the baby move for a while, we recommend you do a fetal kick count. Prior to starting your count drink a glass of water or juice and eat a snack. Then lay down on your side and begin to count movements.     How to do a Fetal Kick Counts    There are many different ways to monitor your baby's movements. Movements can range from  "large jabs to small kicks, or wiggles.  Hiccups count!      Count 10 movements in 2 hours when resting and focusing    Count 10 movements in 12 hours when doing normal activity    We recommend that if movements occur but seem decreased that you should be seen in the clinic or hospital for evaluation within 12 hours. If fetal movement is absent or fetal kick counts are low please contact us right away.    If you ever have any concerns about your baby's movements DO NOT HESITATE to call us, we are here for you!    [facility] Certified Nurse Midwives  [phone]  Pager [phone]    Labor Instructions for Midwife Patients    When to call:  Both during and after office hours call [phone]. There is a Nurse Midwife available to take your calls and answer your questions 24 hours a day.     When to call:  Call anytime you have important concerns about you or your baby.     Call if:    You are having contractions at regular intervals about 5-6 minutes apart lasting 30-60 seconds and becoming increasingly more intense     You have an uncontrollable gush of fluid from your vagina or feel a pop and gush like your water has broken    You have HEAVY bleeding, like heavy period, blood running down your legs, or  soaking a pad.     Some bleeding after a pelvic exam, after intercourse, or in labor when your cervix is dilating is normal and is referred to as \"bloody show\"    You have severe, continuous back or abdominal pain    You feel it is time to go to the hospital    If this is your first labor, call when contractions are very intense and have been about every 3-4 minutes for about an hour    If it is your second labor or more, call when contractions are strong and about every 3-5 minutes or sooner depending on your level of discomfort.     Keep in mind we are always here for you! If you have questions, concerns please don't hesitate to call us.     What to eat/drink in labor: Drink plenty of fluid (water most " importantly, juice, soda or tea without caffeine). Eat rice, pasta, soup, cereal, bread/toast, and fruit. Avoid dairy and greasy food as they are difficult to digest and you may experience some nausea during labor.    Comfort measures:    Baths and showers (ok even with ruptured membranes, it may temporarily slow contractions if you are still in the early stage of labor)    Warm/hot packs for back pain or discomfort    Back, belly, or thigh massages    Standing, rocking, walking, leaning over bed or tables, side-lying and sleeping    Miscellaneous:     Contractions are timed from the beginning of one to the beginning of the next    Try hard to sleep during the early stage of labor when you are not that uncomfortable. Timing of contractions at this point is not important    Even if you cannot sleep, resting in bed or on the couch can help you maintain your energy for labor    When you arrive at the hospital the nurse will check your baby's heartbeat, check your cervix, and will call us. The midwife on call will come in and be with you when you are in active labor    After hours you need to enter the hospital through the emergency room

## 2021-04-09 NOTE — TELEPHONE ENCOUNTER
Reached out to pt via [facility] interpretor.  Karen continues to decline IOL but aware that this is our recommendation d/t concerns of fetal wellbeing.   She consents to  testing with BPP/NST every 3 days.     LAXMI Tamez, CNM

## 2021-04-09 NOTE — TELEPHONE ENCOUNTER
Spoke with pt via [facility] .     Pt states that her due ate is 4/10 or 4/11. I explained that the EDC that we are going with is 4/6/21.    Pt would like to have a pn appt on Tuesday if possible. I did schedule the pt for a pn appt on 4/13/21.    Please advise if you want anything else done.    Raina MCCOY RN

## 2021-04-09 NOTE — TELEPHONE ENCOUNTER
Left message via [facility] interpretor for pt to call back regarding her induction yesterday that she cancelled. Plan to offer IOL or schedule  testing for remainder of pregnancy if she declines.     LAXMI Tamez, CNM

## 2021-04-12 ENCOUNTER — PATIENT OUTREACH (OUTPATIENT)
Dept: NURSING | Facility: CLINIC | Age: 26
End: 2021-04-12
Payer: COMMERCIAL

## 2021-04-12 ASSESSMENT — ACTIVITIES OF DAILY LIVING (ADL): DEPENDENT_IADLS:: TRANSPORTATION;INDEPENDENT

## 2021-04-12 NOTE — PROGRESS NOTES
"Clinic Care Coordination Contact    Assessment: Care Coordinator contacted patient for 2 month follow up and to complete hospital follow-up. Pt endorses some tia, but \"not strong.\"  Patient has continued to follow the plan of care and assessment is negative for any new needs or concerns.    Enrollment status: Graduated.      Plan: No further outreaches at this time.  Patient will continue to follow the plan of care.  If new needs arise a new Care Coordination referral may be placed.  FYI to PCP      SAL Rinaldi  Clinic Care Coordinator  Ph. [phone]  [email or web address]    "

## 2021-04-12 NOTE — LETTER
[facility]  [address] [place] [place], [address]  April 12, 2021    Karen Loya  [address]  [facility] [address]    Dear Karen,    Your Care Team congratulates you on your journey to maintain wellness. This document will help guide you on your journey to maintain a healthy lifestyle.  You can use this to help you overcome any barriers you may encounter.  If you should have any questions or concerns, you can contact the members of your Care Team or contact your Primary Care Clinic for assistance.     Health Maintenance  Health Maintenance Reviewed: Due/Overdue   Health Maintenance Due   Topic Date Due     PREVENTIVE CARE VISIT  Never done     ADVANCE CARE PLANNING  Never done     HPV IMMUNIZATION (1 - 2-dose series) Never done     COVID-19 Vaccine (1) Never done     HEPATITIS C SCREENING  Never done     PAP  Never done     DTAP/TDAP/TD IMMUNIZATION (1 - Tdap) Never done     INFLUENZA VACCINE (1) Never done     PHQ-2  Never done       My Access Plan  Medical Emergency 911   Primary Clinic Line [facility] - [phone]   24 Hour Appointment Line [phone] or  [phone]) (toll-free)   24 Hour Nurse Line [phone] (toll-free)   Preferred Urgent Care     Preferred Hospital [facility]  [phone]   Preferred Pharmacy [id number] [address] AT [facility] [address]     Behavioral Health Crisis Line The National Suicide Prevention Lifeline at 1-195.459.7611 or 911     My Care Team Members  Patient Care Team       Relationship Specialty Notifications Start End    No Ref-Primary, Physician PCP - General   7/19/19     Fax: [phone]         Yahaira Mooney PA-C Assigned PCP   7/23/20     Phone: [phone] Fax: [phone]         [address] [facility] [address]    Sandy Paz, [facility] Lead Care Coordinator  Admissions 12/11/20     Artemio Hraper  Community Health Worker   12/11/20     Vesta Wilkins CNM Assigned OBGYN Provider   3/28/21     Phone: [phone] Fax: [phone]         [address] [facility] [address]              Goals        Patient Stated      COMPLETED: 1. Transportation (pt-stated)      Goal Statement: I want to access my insurance-covered transportation benefit through [employer] within 3 months.   Date Goal set: 12.11.2020  Barriers: COVID-19, English as a second language.   Strengths: Recognition of need, CC involvement and support.   Date to Achieve By: 3.11.2021  Patient expressed understanding of goal: Pt reports understanding and denies any additional questions or concerns at this times. SW CC engaged in [employer] communication during encounter.    Action steps to achieve this goal:  1. I will review [email or web address] information.   2. I will contact [email or web address] to schedule a ride.   3. I will use the transportation benefit through [email or web address] for appointments.               Advance Care Plans/Directives Type:      We notice that you do not have an Advance Directive on file. Upon completion of your Health Care Directive, please bring a copy with you to your next office visit.    It has been your Clinic Care Team's pleasure to work with you on your goals.    Regards,  Your Clinic Care Team    SAL Rinaldi  Clinic Care Coordinator  Ph. [phone]  [email or web address]

## 2021-04-13 ENCOUNTER — TELEPHONE (OUTPATIENT)
Dept: OBGYN | Facility: CLINIC | Age: 26
End: 2021-04-13

## 2021-04-13 ENCOUNTER — OFFICE VISIT (OUTPATIENT)
Dept: MATERNAL FETAL MEDICINE | Facility: CLINIC | Age: 26
End: 2021-04-13
Attending: ADVANCED PRACTICE MIDWIFE
Payer: COMMERCIAL

## 2021-04-13 ENCOUNTER — PRENATAL OFFICE VISIT (OUTPATIENT)
Dept: OBGYN | Facility: CLINIC | Age: 26
End: 2021-04-13
Payer: COMMERCIAL

## 2021-04-13 ENCOUNTER — HOSPITAL ENCOUNTER (OUTPATIENT)
Dept: ULTRASOUND IMAGING | Facility: CLINIC | Age: 26
End: 2021-04-13
Attending: ADVANCED PRACTICE MIDWIFE
Payer: COMMERCIAL

## 2021-04-13 VITALS
BODY MASS INDEX: 36.89 KG/M2 | SYSTOLIC BLOOD PRESSURE: 102 MMHG | DIASTOLIC BLOOD PRESSURE: 60 MMHG | WEIGHT: 221.4 LBS | HEIGHT: 65 IN

## 2021-04-13 DIAGNOSIS — U07.1 COVID-19 AFFECTING PREGNANCY IN THIRD TRIMESTER: Primary | ICD-10-CM

## 2021-04-13 DIAGNOSIS — O26.90 PREGNANCY RELATED CONDITION, ANTEPARTUM: ICD-10-CM

## 2021-04-13 DIAGNOSIS — O09.93 SUPERVISION OF HIGH RISK PREGNANCY IN THIRD TRIMESTER: Primary | ICD-10-CM

## 2021-04-13 DIAGNOSIS — O48.0 POST TERM PREGNANCY, ANTEPARTUM: ICD-10-CM

## 2021-04-13 DIAGNOSIS — U07.1 COVID-19 AFFECTING PREGNANCY IN THIRD TRIMESTER: ICD-10-CM

## 2021-04-13 DIAGNOSIS — O98.513 COVID-19 AFFECTING PREGNANCY IN THIRD TRIMESTER: Primary | ICD-10-CM

## 2021-04-13 DIAGNOSIS — O98.513 COVID-19 AFFECTING PREGNANCY IN THIRD TRIMESTER: ICD-10-CM

## 2021-04-13 PROCEDURE — 76819 FETAL BIOPHYS PROFIL W/O NST: CPT

## 2021-04-13 PROCEDURE — 99207 PR COMPLICATED OB VISIT: CPT | Performed by: ADVANCED PRACTICE MIDWIFE

## 2021-04-13 PROCEDURE — 76818 FETAL BIOPHYS PROFILE W/NST: CPT | Mod: 26 | Performed by: OBSTETRICS & GYNECOLOGY

## 2021-04-13 ASSESSMENT — MIFFLIN-ST. JEOR: SCORE: 1745.14

## 2021-04-13 NOTE — NURSING NOTE
"41w0d    Chief Complaint   Patient presents with     Prenatal Care       Initial /60   Ht 1.651 m (5' 5\")   Wt 100.4 kg (221 lb 6.4 oz)   LMP 07/10/2020 (Approximate)   BMI 36.84 kg/m   Estimated body mass index is 36.84 kg/m  as calculated from the following:    Height as of this encounter: 1.651 m (5' 5\").    Weight as of this encounter: 100.4 kg (221 lb 6.4 oz).  BP completed using cuff size: regular    Questioned patient about current smoking habits.  Pt. has never smoked.          The following HM Due: NONE           "

## 2021-04-13 NOTE — PROGRESS NOTES
"S: Feels well,  Baby active.  Denies uterine cramping, vaginal bleeding or leaking of fluid. No headache, increase in edema, no epigastric pain. She has had some irregular, mild contractions.     Karen presents with  Stephen for discussion of IOL for post dates pregnancy.   Conversation conducted with Gareth interpretor on iPad.     Recommended IOL for maternal obesity and suspected fetal macrosomia (EFW was 3983g, 8lb 12oz,at 40w1d on 4/7), and category II tracings during her induction 4/5-4/7.  It was recommended that pt proceed with PLTCS during her induction on 4/7, but pt declined and left the hospital after a period of reassuring fetal status.     We had a long discussion regarding rational for induction of labor. Pt is very hesitant due to concerns for needing a c section. I discussed that given baby is measuring large, and feels LGA per leopold's, and possible decreased placental functioning at 41+wks I would recommend inducing labor to give her the best chance at vaginal delivery.        O: Vitals: /60   Ht 1.651 m (5' 5\")   Wt 100.4 kg (221 lb 6.4 oz)   LMP 07/10/2020 (Approximate)   BMI 36.84 kg/m    BMI= Body mass index is 36.84 kg/m .  Exam:  Constitutional: healthy, alert and no distress  Respiratory: respirations even and unlabored  Gastrointestinal: Abdomen soft, non-tender. Fundus measures appropriate for gestational age. Fetal heart tones hear without difficulty and within normal limits  : Normal external genitalia without lesions  Difficult exam d/t pt discomfort  Cervix external os 2, internal os ?1-2 / 50% / floating , soft, posterior   Aguirre score 4    Psychiatric: mentation appears normal and affect normal/bright  A/P:  1. (O09.93) Supervision of high risk pregnancy in third trimester  (primary encounter diagnosis)    2. (O36.63X0) Fetal macrosomia in third trimester, single or unspecified fetus  - EFW was 3983g (8lb 12oz) at 40w1d on 4/7  - GCT 99    3. (Z68.38) BMI " 38.0-38.9,adult  - BMI 37    4. (O98.513,  U07.1) COVID-19 affecting pregnancy in third trimester  - Tested positive for COVID-19 on admission 4/5, asymptomatic        BPP/NST scheduled for this afternoon with MFM for postdates pregnancy  Karen and her  agree to IOL on 4/14 at 1500  On call CNM and LD notified   Will check cervix on admission to determine induction method      Encouraged patient to call with any questions or concerns.    LAXMI Tamez, CNM

## 2021-04-13 NOTE — PROGRESS NOTES
Please see full imaging report from ViewPoint program under imaging tab.    BPP 8/8  Reactive NST.     Hugo Jules MD  Maternal Fetal Medicine

## 2021-04-13 NOTE — NURSING NOTE
NST Performed due to post dates.   reviewed efm tracing and okay with patient discharge to home. See NST/BPP Doc Flowsheet tab.

## 2021-04-14 ENCOUNTER — ANESTHESIA (OUTPATIENT)
Dept: OBGYN | Facility: CLINIC | Age: 26
End: 2021-04-14

## 2021-04-14 ENCOUNTER — HOSPITAL ENCOUNTER (INPATIENT)
Facility: CLINIC | Age: 26
LOS: 3 days | Discharge: HOME | End: 2021-04-17
Attending: ADVANCED PRACTICE MIDWIFE | Admitting: ADVANCED PRACTICE MIDWIFE
Payer: COMMERCIAL

## 2021-04-14 ENCOUNTER — ANESTHESIA EVENT (OUTPATIENT)
Dept: OBGYN | Facility: CLINIC | Age: 26
End: 2021-04-14

## 2021-04-14 PROBLEM — O48.0 POST-DATES PREGNANCY: Status: ACTIVE | Noted: 2021-04-14

## 2021-04-14 LAB
ABO + RH BLD: NORMAL
BASOPHILS # BLD AUTO: 0 10E9/L (ref 0–0.2)
BASOPHILS NFR BLD AUTO: 0.4 %
BLD GP AB SCN SERPL QL: NORMAL
BLOOD BANK CMNT PATIENT-IMP: NORMAL
DIFFERENTIAL METHOD BLD: ABNORMAL
EOSINOPHIL # BLD AUTO: 0.1 10E9/L (ref 0–0.7)
EOSINOPHIL NFR BLD AUTO: 0.6 %
ERYTHROCYTE [DISTWIDTH] IN BLOOD BY AUTOMATED COUNT: 19.3 % (ref 10–15)
HCT VFR BLD AUTO: 30.1 % (ref 35–47)
HGB BLD-MCNC: 9.5 G/DL (ref 11.7–15.7)
IMM GRANULOCYTES # BLD: 0 10E9/L (ref 0–0.4)
IMM GRANULOCYTES NFR BLD: 0.5 %
LYMPHOCYTES # BLD AUTO: 2.2 10E9/L (ref 0.8–5.3)
LYMPHOCYTES NFR BLD AUTO: 26.2 %
MCH RBC QN AUTO: 22.1 PG (ref 26.5–33)
MCHC RBC AUTO-ENTMCNC: 31.6 G/DL (ref 31.5–36.5)
MCV RBC AUTO: 70 FL (ref 78–100)
MONOCYTES # BLD AUTO: 0.6 10E9/L (ref 0–1.3)
MONOCYTES NFR BLD AUTO: 7.2 %
NEUTROPHILS # BLD AUTO: 5.4 10E9/L (ref 1.6–8.3)
NEUTROPHILS NFR BLD AUTO: 65.1 %
NRBC # BLD AUTO: 0 10*3/UL
NRBC BLD AUTO-RTO: 0 /100
PLATELET # BLD AUTO: 265 10E9/L (ref 150–450)
RBC # BLD AUTO: 4.29 10E12/L (ref 3.8–5.2)
SPECIMEN EXP DATE BLD: NORMAL
SPECIMEN EXP DATE BLD: NORMAL
WBC # BLD AUTO: 8.2 10E9/L (ref 4–11)

## 2021-04-14 PROCEDURE — 86850 RBC ANTIBODY SCREEN: CPT | Performed by: ADVANCED PRACTICE MIDWIFE

## 2021-04-14 PROCEDURE — 86900 BLOOD TYPING SEROLOGIC ABO: CPT | Performed by: ADVANCED PRACTICE MIDWIFE

## 2021-04-14 PROCEDURE — 86901 BLOOD TYPING SEROLOGIC RH(D): CPT | Performed by: ADVANCED PRACTICE MIDWIFE

## 2021-04-14 PROCEDURE — 85025 COMPLETE CBC W/AUTO DIFF WBC: CPT | Performed by: ADVANCED PRACTICE MIDWIFE

## 2021-04-14 PROCEDURE — 120N000001 HC R&B MED SURG/OB

## 2021-04-14 PROCEDURE — 258N000003 HC RX IP 258 OP 636: Performed by: ADVANCED PRACTICE MIDWIFE

## 2021-04-14 PROCEDURE — 250N000011 HC RX IP 250 OP 636: Performed by: ADVANCED PRACTICE MIDWIFE

## 2021-04-14 PROCEDURE — 250N000009 HC RX 250: Performed by: ADVANCED PRACTICE MIDWIFE

## 2021-04-14 PROCEDURE — 370N000003 HC ANESTHESIA WARD SERVICE

## 2021-04-14 PROCEDURE — 999N000011 HC STATISTIC ANESTHESIA CASE

## 2021-04-14 RX ORDER — NALOXONE HYDROCHLORIDE 0.4 MG/ML
0.2 INJECTION, SOLUTION INTRAMUSCULAR; INTRAVENOUS; SUBCUTANEOUS
Status: DISCONTINUED | OUTPATIENT
Start: 2021-04-14 | End: 2021-04-15

## 2021-04-14 RX ORDER — CARBOPROST TROMETHAMINE 250 UG/ML
250 INJECTION, SOLUTION INTRAMUSCULAR
Status: DISCONTINUED | OUTPATIENT
Start: 2021-04-14 | End: 2021-04-15

## 2021-04-14 RX ORDER — PENICILLIN G POTASSIUM 5000000 [IU]/1
5 INJECTION, POWDER, FOR SOLUTION INTRAMUSCULAR; INTRAVENOUS ONCE
Status: COMPLETED | OUTPATIENT
Start: 2021-04-14 | End: 2021-04-14

## 2021-04-14 RX ORDER — ACETAMINOPHEN 325 MG/1
650 TABLET ORAL EVERY 4 HOURS PRN
Status: DISCONTINUED | OUTPATIENT
Start: 2021-04-14 | End: 2021-04-15

## 2021-04-14 RX ORDER — METHYLERGONOVINE MALEATE 0.2 MG/ML
200 INJECTION INTRAVENOUS
Status: DISCONTINUED | OUTPATIENT
Start: 2021-04-14 | End: 2021-04-15

## 2021-04-14 RX ORDER — NALOXONE HYDROCHLORIDE 0.4 MG/ML
0.4 INJECTION, SOLUTION INTRAMUSCULAR; INTRAVENOUS; SUBCUTANEOUS
Status: DISCONTINUED | OUTPATIENT
Start: 2021-04-14 | End: 2021-04-15

## 2021-04-14 RX ORDER — TRANEXAMIC ACID 10 MG/ML
1 INJECTION, SOLUTION INTRAVENOUS EVERY 30 MIN PRN
Status: DISCONTINUED | OUTPATIENT
Start: 2021-04-14 | End: 2021-04-15

## 2021-04-14 RX ORDER — OXYTOCIN/0.9 % SODIUM CHLORIDE 30/500 ML
100-340 PLASTIC BAG, INJECTION (ML) INTRAVENOUS CONTINUOUS PRN
Status: COMPLETED | OUTPATIENT
Start: 2021-04-14 | End: 2021-04-15

## 2021-04-14 RX ORDER — OXYCODONE AND ACETAMINOPHEN 5; 325 MG/1; MG/1
1 TABLET ORAL
Status: DISCONTINUED | OUTPATIENT
Start: 2021-04-14 | End: 2021-04-15

## 2021-04-14 RX ORDER — TERBUTALINE SULFATE 1 MG/ML
0.25 INJECTION, SOLUTION SUBCUTANEOUS
Status: DISCONTINUED | OUTPATIENT
Start: 2021-04-14 | End: 2021-04-15

## 2021-04-14 RX ORDER — OXYTOCIN 10 [USP'U]/ML
10 INJECTION, SOLUTION INTRAMUSCULAR; INTRAVENOUS
Status: DISCONTINUED | OUTPATIENT
Start: 2021-04-14 | End: 2021-04-15

## 2021-04-14 RX ORDER — FENTANYL CITRATE 50 UG/ML
50-100 INJECTION, SOLUTION INTRAMUSCULAR; INTRAVENOUS
Status: DISCONTINUED | OUTPATIENT
Start: 2021-04-14 | End: 2021-04-15

## 2021-04-14 RX ORDER — OXYTOCIN/0.9 % SODIUM CHLORIDE 30/500 ML
1-24 PLASTIC BAG, INJECTION (ML) INTRAVENOUS CONTINUOUS
Status: DISCONTINUED | OUTPATIENT
Start: 2021-04-14 | End: 2021-04-15

## 2021-04-14 RX ORDER — ONDANSETRON 2 MG/ML
4 INJECTION INTRAMUSCULAR; INTRAVENOUS EVERY 6 HOURS PRN
Status: DISCONTINUED | OUTPATIENT
Start: 2021-04-14 | End: 2021-04-15

## 2021-04-14 RX ORDER — LIDOCAINE 40 MG/G
CREAM TOPICAL
Status: DISCONTINUED | OUTPATIENT
Start: 2021-04-14 | End: 2021-04-15

## 2021-04-14 RX ORDER — SODIUM CHLORIDE, SODIUM LACTATE, POTASSIUM CHLORIDE, CALCIUM CHLORIDE 600; 310; 30; 20 MG/100ML; MG/100ML; MG/100ML; MG/100ML
INJECTION, SOLUTION INTRAVENOUS CONTINUOUS
Status: DISCONTINUED | OUTPATIENT
Start: 2021-04-14 | End: 2021-04-15

## 2021-04-14 RX ORDER — IBUPROFEN 800 MG/1
800 TABLET, FILM COATED ORAL
Status: DISCONTINUED | OUTPATIENT
Start: 2021-04-14 | End: 2021-04-15

## 2021-04-14 RX ADMIN — PENICILLIN G POTASSIUM 5 MILLION UNITS: 5000000 POWDER, FOR SOLUTION INTRAMUSCULAR; INTRAPLEURAL; INTRATHECAL; INTRAVENOUS at 18:01

## 2021-04-14 RX ADMIN — SODIUM CHLORIDE 2.5 MILLION UNITS: 9 INJECTION, SOLUTION INTRAVENOUS at 21:51

## 2021-04-14 RX ADMIN — Medication 6 MILLI-UNITS/MIN: at 20:37

## 2021-04-14 RX ADMIN — SODIUM CHLORIDE, POTASSIUM CHLORIDE, SODIUM LACTATE AND CALCIUM CHLORIDE: 600; 310; 30; 20 INJECTION, SOLUTION INTRAVENOUS at 17:58

## 2021-04-14 RX ADMIN — Medication 8 MILLI-UNITS/MIN: at 21:59

## 2021-04-14 RX ADMIN — Medication 2 MILLI-UNITS/MIN: at 18:00

## 2021-04-14 ASSESSMENT — MIFFLIN-ST. JEOR: SCORE: 1743.33

## 2021-04-14 NOTE — PROVIDER NOTIFICATION
04/14/21 1602   Provider Notification   Provider Name/Title Clementina Smith CNM   Method of Notification At Bedside   Request Evaluate in Person   Notification Reason Patient Arrived   Clementina HALINA at bedside upon patient arrival to discuss POC. Discussed cervical ripening options of oral cytotec, cervidil, and mechanical ripening with balloon, followed by pitocin. Patient states she only wants to take the oral pill and then pitocin for induction. Patient and significant other's questions answered and they are in agreement with plan for induction. Plan to start admission process and Clementina will be back for SVE.   Admission completed via  Ipad ID [id number].

## 2021-04-14 NOTE — H&P
HALINA Labor Admission History & Physical    Karen Loya is a 26 year old  with an IUP at 41w1d  [place]; ,   Partner/support Person: Stephen  Language Barrier: Panamanian  Clinic: [facility]   Provider: LAXMI Sancehz, HALINA    Karen Loya is admitted to the [facility] at [facility] on 2021 at 4:19 PM       History of present inllness/Chief Complaint:    Here with: induction of labor secondary to high prepregnancy BMI and 41 weeks gestation  Patient reports contractions are Irregular     Baby active Yes  Membranes are intact.  Bloody show No   Any changes with medical history since last prenatal visit No  Declines syphilis screening.  Not needed as she has had a recent negative test    Obstetrical history  Estimated Date of Delivery: 2021 determined by ultrasound at 8 weeks gestation  Patient's last menstrual period was 07/10/2020 (approximate).   Dating U/S: 20    Fetal anatomic survey: Abnormal: low lying placenta (now resolved), EFW 99%  Placenta: posterior    PRENATAL COURSE  Prenatal care began at 12 wks gestation for a total of 11 prenatal visits.  Total wt gain 15 pounds; There is no height or weight on file to calculate BMI.  Prenatal Blood Pressure: WNL  Prenatal course was   complicated by high BMI,   Patient Active Problem List    Diagnosis Date Noted     Post-dates pregnancy 2021     Priority: Medium     Post term pregnancy, antepartum 2021     Priority: Medium     COVID-19 affecting pregnancy in third trimester 2021     Priority: Medium     Anemia affecting pregnancy in third trimester 2021     Priority: Medium     9.0 in 3rd tri, iron transfusions ordered, unable to schedule prior to IOL  9.2 on admission       Labor and delivery, indication for care 2021     Priority: Medium     Supervision of high risk pregnancy in third trimester 2021     Priority: Medium       Clinic/Hospital: [facility]  Partner Name: Stephen  Ultrasound predicts sex:  boy  Childrens names/ages: 20 month old boy  Previous labor experiences: IOL for preeclampsia,  of 7lb8oz boy   Waterbirth (interest, declined, ineligible): n/a  Level of education/occupation:   Pertinent History: Obesity, belkis. Macrosomia. Previous infant was suspected to be LGA and was AGA   PP contraception: Undecided   Hx PPD/Depression/Anxiety: Denies   Peds provider:    Plans for labor pain management: Epidural   Plans for post partum recovery/time off:  Feeding preference: breast   Breast pump:   Car seat:  Circumcision:  Discussed 2 week visit:  Tdap:   Flu:         GBS (group B Streptococcus carrier), +RV culture, currently pregnant 03/15/2021     Priority: Medium     BMI 38.0-38.9,adult 2021     Priority: Medium     Fetal macrosomia in third trimester, single or unspecified fetus 2021     Priority: Medium     History of pre-eclampsia in prior pregnancy, currently pregnant 2020     Priority: Medium     Nausea/vomiting in pregnancy 2020     Priority: Medium     History of maternal third degree perineal laceration, currently pregnant 2020     Priority: Medium     Repair of partial third degree lac with first birth       Tdap: not given  Rhogam: NA    Patient Active Problem List   Diagnosis     History of pre-eclampsia in prior pregnancy, currently pregnant     Nausea/vomiting in pregnancy     History of maternal third degree perineal laceration, currently pregnant     BMI 38.0-38.9,adult     Fetal macrosomia in third trimester, single or unspecified fetus     GBS (group B Streptococcus carrier), +RV culture, currently pregnant     Supervision of high risk pregnancy in third trimester     Labor and delivery, indication for care     Anemia affecting pregnancy in third trimester     COVID-19 affecting pregnancy in third trimester     Post term pregnancy, antepartum       HISTORY  No Known Allergies  Past Medical History:   Diagnosis Date     No pertinent past medical history  2020     Past Surgical History:   Procedure Laterality Date     NO HISTORY OF SURGERY  2020     No family history on file.  Social History     Tobacco Use     Smoking status: Never Smoker     Smokeless tobacco: Never Used   Substance Use Topics     Alcohol use: Never     Frequency: Never     OB History    Para Term  AB Living   2 1 1 0 0 1   SAB TAB Ectopic Multiple Live Births   0 0 0 0 1      # Outcome Date GA Lbr Robert/2nd Weight Sex Delivery Anes PTL Lv   2 Current            1 Term 19 40w0d  3.175 kg (7 lb) M Vag-Spont   MARLA      Birth Comments: partial 3rd degree lac, IOL for GHTN       LABS:  Lab Results   Component Value Date    ABO O 2021    RH Pos 2021    AS Neg 2021    HGB 9.2 (L) 2021    HEPBANG Nonreactive 10/21/2020    CHPCRT Negative 10/21/2020    GCPCRT Negative 10/21/2020       GBS Status:   Lab Results   Component Value Date    GBS Positive (A) 2021     Rubella: Immune    HIV: Non-Reactive   Platelets:  239  1hr GCT:  99    ROS   Pt is alert and oriented  Pt denies significant constitutional symptoms including fever and/or malaise.    Pt denies significant respiratory, cardiovacular, GI, or muscular/skeletal complaints.    Neuro: Denies HA and visual changes  Muscoloskeletal: Denies except for discomforts r/t pregnancy     PHYSICAL EXAM:  LMP 07/10/2020 (Approximate)   General appearance:  healthy, alert and active   Heart: RRR  Lungs: CTA bilaterally, normal respiratory effort  Abdomen: gravid, single vertex fetus, non-tender, EFW 8 1/2 lbs.   Legs: reflexes 2+ bilaterally, no clonus, no edema     Contractions: Pt is tia in an irregular pattern    Fetal heart tones: Baseline 168   Variability: moderate   Accelerations: present  Decelerations: absent    NST: reactive    Cervix: 2.5/ 50%/ Mid/ soft/ -2, Vtx  Bloody show: no  Membranes:  Intact    ASSESSMENT:  26 year old  with santos IUP 41w1d for induction of labor.   Indication BMI  NST reactive  GBS positive and membranes intact    PLAN:  Routine CNM care  Labs ordered: Hemoglobin and type and screen  Teaching done r/t comfort measures, pain management options, and labor processes, and indications for antibiotics in labor for GBS  Admit - see IP orders  Labor induction with Pitocin  Anticipate     LUCIA LEWIS CNM

## 2021-04-14 NOTE — PROVIDER NOTIFICATION
21 1637   Provider Notification   Provider Name/Title Clementina Smith HALINA   Method of Notification At Bedside    here at 41 1/7 weeks for induction of labor. External monitors applied and explained. Health history obtained and physical assessment completed. No c/o vaginal bleeding or LOF. Patient feeling irregular contractions. Positive fetal movement felt by patient. FHT's 150's.     SVE 2.5/50/-2, tillman 7. Plan to start induction with pitocin, patient in agreement. Discussed GBS positive and possibility of  becoming infected, patient agrees to receive penicillin. Plan to place IV and start pitocin.

## 2021-04-15 LAB
ANISOCYTOSIS BLD QL SMEAR: ABNORMAL
BASOPHILS # BLD AUTO: 0 10E9/L (ref 0–0.2)
BASOPHILS NFR BLD AUTO: 0.2 %
CREAT SERPL-MCNC: 0.41 MG/DL (ref 0.52–1.04)
DACRYOCYTES BLD QL SMEAR: SLIGHT
DIFFERENTIAL METHOD BLD: ABNORMAL
EOSINOPHIL # BLD AUTO: 0 10E9/L (ref 0–0.7)
EOSINOPHIL NFR BLD AUTO: 0.1 %
ERYTHROCYTE [DISTWIDTH] IN BLOOD BY AUTOMATED COUNT: 19 % (ref 10–15)
GFR SERPL CREATININE-BSD FRML MDRD: >90 ML/MIN/{1.73_M2}
HCT VFR BLD AUTO: 24.7 % (ref 35–47)
HGB BLD-MCNC: 7.8 G/DL (ref 11.7–15.7)
HYPOCHROMIA BLD QL: PRESENT
IMM GRANULOCYTES # BLD: 0.1 10E9/L (ref 0–0.4)
IMM GRANULOCYTES NFR BLD: 0.7 %
LYMPHOCYTES # BLD AUTO: 1.7 10E9/L (ref 0.8–5.3)
LYMPHOCYTES NFR BLD AUTO: 12.1 %
MACROCYTES BLD QL SMEAR: PRESENT
MCH RBC QN AUTO: 22.5 PG (ref 26.5–33)
MCHC RBC AUTO-ENTMCNC: 31.6 G/DL (ref 31.5–36.5)
MCV RBC AUTO: 71 FL (ref 78–100)
MICROCYTES BLD QL SMEAR: PRESENT
MONOCYTES # BLD AUTO: 1.1 10E9/L (ref 0–1.3)
MONOCYTES NFR BLD AUTO: 7.4 %
NEUTROPHILS # BLD AUTO: 11.3 10E9/L (ref 1.6–8.3)
NEUTROPHILS NFR BLD AUTO: 79.5 %
NRBC # BLD AUTO: 0 10*3/UL
NRBC BLD AUTO-RTO: 0 /100
PLATELET # BLD AUTO: 231 10E9/L (ref 150–450)
PLATELET # BLD EST: ABNORMAL 10*3/UL
RBC # BLD AUTO: 3.46 10E12/L (ref 3.8–5.2)
WBC # BLD AUTO: 14.2 10E9/L (ref 4–11)

## 2021-04-15 PROCEDURE — 722N000001 HC LABOR CARE VAGINAL DELIVERY SINGLE

## 2021-04-15 PROCEDURE — 59400 OBSTETRICAL CARE: CPT | Performed by: ADVANCED PRACTICE MIDWIFE

## 2021-04-15 PROCEDURE — 258N000003 HC RX IP 258 OP 636: Performed by: ADVANCED PRACTICE MIDWIFE

## 2021-04-15 PROCEDURE — 59414 DELIVER PLACENTA: CPT | Performed by: FAMILY MEDICINE

## 2021-04-15 PROCEDURE — 3E0R3BZ INTRODUCTION OF ANESTHETIC AGENT INTO SPINAL CANAL, PERCUTANEOUS APPROACH: ICD-10-PCS | Performed by: ANESTHESIOLOGY

## 2021-04-15 PROCEDURE — 00HU33Z INSERTION OF INFUSION DEVICE INTO SPINAL CANAL, PERCUTANEOUS APPROACH: ICD-10-PCS | Performed by: ANESTHESIOLOGY

## 2021-04-15 PROCEDURE — 370N000003 HC ANESTHESIA WARD SERVICE

## 2021-04-15 PROCEDURE — 250N000011 HC RX IP 250 OP 636

## 2021-04-15 PROCEDURE — 0UQMXZZ REPAIR VULVA, EXTERNAL APPROACH: ICD-10-PCS | Performed by: FAMILY MEDICINE

## 2021-04-15 PROCEDURE — 82565 ASSAY OF CREATININE: CPT | Performed by: ADVANCED PRACTICE MIDWIFE

## 2021-04-15 PROCEDURE — 0DQR0ZZ REPAIR ANAL SPHINCTER, OPEN APPROACH: ICD-10-PCS | Performed by: FAMILY MEDICINE

## 2021-04-15 PROCEDURE — 85025 COMPLETE CBC W/AUTO DIFF WBC: CPT | Performed by: ADVANCED PRACTICE MIDWIFE

## 2021-04-15 PROCEDURE — 59025 FETAL NON-STRESS TEST: CPT | Mod: 26 | Performed by: ADVANCED PRACTICE MIDWIFE

## 2021-04-15 PROCEDURE — 36415 COLL VENOUS BLD VENIPUNCTURE: CPT | Performed by: ADVANCED PRACTICE MIDWIFE

## 2021-04-15 PROCEDURE — 120N000001 HC R&B MED SURG/OB

## 2021-04-15 PROCEDURE — 59300 EPISIOTOMY OR VAGINAL REPAIR: CPT | Performed by: FAMILY MEDICINE

## 2021-04-15 PROCEDURE — 250N000013 HC RX MED GY IP 250 OP 250 PS 637: Performed by: ADVANCED PRACTICE MIDWIFE

## 2021-04-15 PROCEDURE — 250N000009 HC RX 250: Performed by: ADVANCED PRACTICE MIDWIFE

## 2021-04-15 PROCEDURE — 250N000011 HC RX IP 250 OP 636: Performed by: ADVANCED PRACTICE MIDWIFE

## 2021-04-15 PROCEDURE — 250N000011 HC RX IP 250 OP 636: Performed by: FAMILY MEDICINE

## 2021-04-15 RX ORDER — AMOXICILLIN 250 MG
1 CAPSULE ORAL 2 TIMES DAILY
Status: DISCONTINUED | OUTPATIENT
Start: 2021-04-15 | End: 2021-04-17 | Stop reason: HOSPADM

## 2021-04-15 RX ORDER — AMOXICILLIN 250 MG
2 CAPSULE ORAL 2 TIMES DAILY
Status: DISCONTINUED | OUTPATIENT
Start: 2021-04-15 | End: 2021-04-17 | Stop reason: HOSPADM

## 2021-04-15 RX ORDER — OXYTOCIN 10 [USP'U]/ML
10 INJECTION, SOLUTION INTRAMUSCULAR; INTRAVENOUS
Status: DISCONTINUED | OUTPATIENT
Start: 2021-04-15 | End: 2021-04-17 | Stop reason: HOSPADM

## 2021-04-15 RX ORDER — FENTANYL/BUPIVACAINE/NS/PF 2-1250MCG
PLASTIC BAG, INJECTION (ML) INJECTION
Status: COMPLETED
Start: 2021-04-15 | End: 2021-04-15

## 2021-04-15 RX ORDER — IBUPROFEN 800 MG/1
800 TABLET, FILM COATED ORAL EVERY 6 HOURS PRN
Status: DISCONTINUED | OUTPATIENT
Start: 2021-04-15 | End: 2021-04-17 | Stop reason: HOSPADM

## 2021-04-15 RX ORDER — HYDROCORTISONE 2.5 %
CREAM (GRAM) TOPICAL 3 TIMES DAILY PRN
Status: DISCONTINUED | OUTPATIENT
Start: 2021-04-15 | End: 2021-04-17 | Stop reason: HOSPADM

## 2021-04-15 RX ORDER — CEFAZOLIN SODIUM 1 G/3ML
1 INJECTION, POWDER, FOR SOLUTION INTRAMUSCULAR; INTRAVENOUS EVERY 8 HOURS
Status: COMPLETED | OUTPATIENT
Start: 2021-04-15 | End: 2021-04-16

## 2021-04-15 RX ORDER — MODIFIED LANOLIN
OINTMENT (GRAM) TOPICAL
Status: DISCONTINUED | OUTPATIENT
Start: 2021-04-15 | End: 2021-04-17 | Stop reason: HOSPADM

## 2021-04-15 RX ORDER — OXYTOCIN/0.9 % SODIUM CHLORIDE 30/500 ML
100 PLASTIC BAG, INJECTION (ML) INTRAVENOUS CONTINUOUS
Status: DISCONTINUED | OUTPATIENT
Start: 2021-04-15 | End: 2021-04-17 | Stop reason: HOSPADM

## 2021-04-15 RX ORDER — ONDANSETRON 2 MG/ML
4 INJECTION INTRAMUSCULAR; INTRAVENOUS EVERY 6 HOURS PRN
Status: DISCONTINUED | OUTPATIENT
Start: 2021-04-15 | End: 2021-04-15

## 2021-04-15 RX ORDER — SCOLOPAMINE TRANSDERMAL SYSTEM 1 MG/1
1 PATCH, EXTENDED RELEASE TRANSDERMAL ONCE
Status: DISCONTINUED | OUTPATIENT
Start: 2021-04-15 | End: 2021-04-15

## 2021-04-15 RX ORDER — NALBUPHINE HYDROCHLORIDE 10 MG/ML
2.5-5 INJECTION, SOLUTION INTRAMUSCULAR; INTRAVENOUS; SUBCUTANEOUS EVERY 6 HOURS PRN
Status: DISCONTINUED | OUTPATIENT
Start: 2021-04-15 | End: 2021-04-15

## 2021-04-15 RX ORDER — OXYTOCIN/0.9 % SODIUM CHLORIDE 30/500 ML
340 PLASTIC BAG, INJECTION (ML) INTRAVENOUS CONTINUOUS PRN
Status: DISCONTINUED | OUTPATIENT
Start: 2021-04-15 | End: 2021-04-17 | Stop reason: HOSPADM

## 2021-04-15 RX ORDER — EPHEDRINE SULFATE 50 MG/ML
5 INJECTION, SOLUTION INTRAMUSCULAR; INTRAVENOUS; SUBCUTANEOUS
Status: DISCONTINUED | OUTPATIENT
Start: 2021-04-15 | End: 2021-04-15

## 2021-04-15 RX ORDER — FERROUS SULFATE 325(65) MG
325 TABLET ORAL DAILY
Status: DISCONTINUED | OUTPATIENT
Start: 2021-04-15 | End: 2021-04-17 | Stop reason: HOSPADM

## 2021-04-15 RX ORDER — ONDANSETRON 4 MG/1
4 TABLET, ORALLY DISINTEGRATING ORAL EVERY 6 HOURS PRN
Status: DISCONTINUED | OUTPATIENT
Start: 2021-04-15 | End: 2021-04-15

## 2021-04-15 RX ORDER — BISACODYL 10 MG
10 SUPPOSITORY, RECTAL RECTAL DAILY PRN
Status: DISCONTINUED | OUTPATIENT
Start: 2021-04-17 | End: 2021-04-17 | Stop reason: HOSPADM

## 2021-04-15 RX ORDER — MISOPROSTOL 200 UG/1
800 TABLET ORAL ONCE
Status: DISCONTINUED | OUTPATIENT
Start: 2021-04-15 | End: 2021-04-17 | Stop reason: HOSPADM

## 2021-04-15 RX ORDER — ACETAMINOPHEN 325 MG/1
650 TABLET ORAL EVERY 4 HOURS PRN
Status: DISCONTINUED | OUTPATIENT
Start: 2021-04-15 | End: 2021-04-17 | Stop reason: HOSPADM

## 2021-04-15 RX ORDER — TRANEXAMIC ACID 10 MG/ML
1 INJECTION, SOLUTION INTRAVENOUS EVERY 30 MIN PRN
Status: DISCONTINUED | OUTPATIENT
Start: 2021-04-15 | End: 2021-04-17 | Stop reason: HOSPADM

## 2021-04-15 RX ADMIN — SODIUM CHLORIDE 2.5 MILLION UNITS: 9 INJECTION, SOLUTION INTRAVENOUS at 06:06

## 2021-04-15 RX ADMIN — FENTANYL CITRATE 100 MCG: 50 INJECTION, SOLUTION INTRAMUSCULAR; INTRAVENOUS at 07:35

## 2021-04-15 RX ADMIN — FERROUS SULFATE TAB 325 MG (65 MG ELEMENTAL FE) 325 MG: 325 (65 FE) TAB at 22:29

## 2021-04-15 RX ADMIN — Medication 100 ML/HR: at 11:59

## 2021-04-15 RX ADMIN — SODIUM CHLORIDE 500 ML: 9 INJECTION, SOLUTION INTRAVENOUS at 20:45

## 2021-04-15 RX ADMIN — CEFAZOLIN 1 G: 1 INJECTION, POWDER, FOR SOLUTION INTRAMUSCULAR; INTRAVENOUS at 22:20

## 2021-04-15 RX ADMIN — FENTANYL CITRATE 100 MCG: 50 INJECTION, SOLUTION INTRAMUSCULAR; INTRAVENOUS at 04:21

## 2021-04-15 RX ADMIN — Medication 9 MILLI-UNITS/MIN: at 02:08

## 2021-04-15 RX ADMIN — IBUPROFEN 800 MG: 800 TABLET, FILM COATED ORAL at 22:29

## 2021-04-15 RX ADMIN — FENTANYL CITRATE 100 MCG: 50 INJECTION, SOLUTION INTRAMUSCULAR; INTRAVENOUS at 06:10

## 2021-04-15 RX ADMIN — SODIUM CHLORIDE 2.5 MILLION UNITS: 9 INJECTION, SOLUTION INTRAVENOUS at 02:03

## 2021-04-15 RX ADMIN — Medication: at 11:55

## 2021-04-15 RX ADMIN — Medication: at 09:25

## 2021-04-15 RX ADMIN — SODIUM CHLORIDE 2.5 MILLION UNITS: 9 INJECTION, SOLUTION INTRAVENOUS at 10:18

## 2021-04-15 RX ADMIN — DOCUSATE SODIUM 50 MG AND SENNOSIDES 8.6 MG 1 TABLET: 8.6; 5 TABLET, FILM COATED ORAL at 22:29

## 2021-04-15 RX ADMIN — FENTANYL CITRATE 100 MCG: 50 INJECTION, SOLUTION INTRAMUSCULAR; INTRAVENOUS at 11:39

## 2021-04-15 RX ADMIN — CEFAZOLIN 1 G: 1 INJECTION, POWDER, FOR SOLUTION INTRAMUSCULAR; INTRAVENOUS at 12:58

## 2021-04-15 RX ADMIN — LIDOCAINE HYDROCHLORIDE 10 ML: 10 INJECTION, SOLUTION EPIDURAL; INFILTRATION; INTRACAUDAL; PERINEURAL at 12:31

## 2021-04-15 NOTE — PROGRESS NOTES
"CNM PROGRESS NOTE    SUBJECTIVE:  Karen is coping well with contractions.  Reports they are stronger and she had some bleeding when she just went to the bathroom.  Wincing and holding her breath with contractions.  Desires pain medication but no epidural at this time.  SVE reveals cervical change with BBOW.  She declines AROM.  BSUS confirms vertex as FHTs are best achieved in RUQ.  Difficult to assess ctx pattern due to Karen moving the toco.    OBJECTIVE:  /75   Temp 97.7  F (36.5  C) (Oral)   Resp 18   Ht 1.651 m (5' 5\")   Wt 100.2 kg (221 lb)   LMP 07/10/2020 (Approximate)   BMI 36.78 kg/m      Fetal heart tones: Baseline 140   Variability: periods of moderate and minimal  Accelerations: present  Decelerations: present, recurrent late decels from 6701-4241 and 0833-5412.  Category 1 FHTs 1047-9328.      Contractions: Pt is tia every 2-4 minutes, lasting  seconds and palpates strong    Cervix: 5/ 90% / -2, Vtx  ROM: not ruptured, BBOW    Pitocin- 9 mu/min.  Antibiotics- PCN  Cervical ripening: N/A    ASSESSMENT:  IUP @ 41w1d IOL for postdates   Category 1/2 FHTs  Suspected LGA infant: EFW by US 4/5/21 3943g  Entering active labor  GBS- positive  Obesity in pregnancy  Anemia in pregnancy s/p iron transfusions, 9.5 on admit  COVID + 4/5/21  Suspected fetal macrosomia  Hx preE with severe features in previous pregnancy  Hx partial third degree laceration     PLAN:   -May continue to observe per Lon Category 2 FHR algorithm (moderate variability and/or accels, latent phase, < 50% of contractions with significant decels for 1 hour) now with change in SVE  -IV fentanyl per pt wishes  -Continue titrating pitocin per protocol as able  -Continue to discuss AROM to help progress labor  -Continue GBS antibiotics   -Intrauterine corrective measures as needed  -Consult OB for persistent category 2 FHTs according to Lon Category 2 FHT management algorithm  -Reevaluate in 2-4 hours/JANNETN    Maile" HALINA Kay

## 2021-04-15 NOTE — PROVIDER NOTIFICATION
04/15/21 0730   Provider Notification   Provider Name/Title HALINA Gr   Method of Notification At Bedside   HALINA Gr at bedside to check in with patient. IPAD  used to communicate to clarify if patient would like an epidural now or more IV pain medication. Patient desires more IV pain medication at this time. HALINA will be passing care to HALINA Sahu at 0800

## 2021-04-15 NOTE — ANESTHESIA PREPROCEDURE EVALUATION
Anesthesia Pre-Procedure Evaluation    Patient: Karen Loya   MRN: [id number] : 1995        Preoperative Diagnosis: * No pre-op diagnosis entered *   Procedure : * No procedures listed *     Past Medical History:   Diagnosis Date     No pertinent past medical history 2020      Past Surgical History:   Procedure Laterality Date     NO HISTORY OF SURGERY  2020      No Known Allergies   Social History     Tobacco Use     Smoking status: Never Smoker     Smokeless tobacco: Never Used   Substance Use Topics     Alcohol use: Never     Frequency: Never      Wt Readings from Last 1 Encounters:   21 100.2 kg (221 lb)        Anesthesia Evaluation   Pt has not had prior anesthetic         ROS/MED HX  ENT/Pulmonary:  - neg pulmonary ROS     Neurologic:  - neg neurologic ROS     Cardiovascular:  - neg cardiovascular ROS     METS/Exercise Tolerance:     Hematologic:  - neg hematologic  ROS     Musculoskeletal:       GI/Hepatic:  - neg GI/hepatic ROS     Renal/Genitourinary:       Endo:  - neg endo ROS     Psychiatric/Substance Use:  - neg psychiatric ROS     Infectious Disease:       Malignancy:       Other:            Physical Exam    Airway        Mallampati: II   TM distance: > 3 FB   Neck ROM: full   Mouth opening: > 3 cm    Respiratory Devices and Support         Dental  no notable dental history         Cardiovascular   cardiovascular exam normal          Pulmonary   pulmonary exam normal                OUTSIDE LABS:  CBC:   Lab Results   Component Value Date    WBC 8.2 2021    WBC 7.4 2021    HGB 9.5 (L) 2021    HGB 9.2 (L) 2021    HCT 30.1 (L) 2021    HCT 29.1 (L) 2021     2021     2021     BMP:   Lab Results   Component Value Date     2020     10/31/2019    POTASSIUM 3.6 2020    POTASSIUM 4.1 10/31/2019    CHLORIDE 106 2020    CHLORIDE 108 10/31/2019    CO2 29 2020    CO2 26 10/31/2019    BUN 8  01/21/2020    BUN 10 10/31/2019    CR 0.41 (L) 01/21/2020    CR 0.37 (L) 10/31/2019    GLC 99 01/21/2020     (H) 10/31/2019     COAGS:   Lab Results   Component Value Date    INR 0.8 01/02/2019     POC:   Lab Results   Component Value Date    HCG Negative 01/21/2020     HEPATIC:   Lab Results   Component Value Date    ALT 22 01/04/2019    AST 48 (A) 01/04/2019     OTHER:   Lab Results   Component Value Date    A1C 5.2 10/21/2020    TAMIE 8.9 01/21/2020    TSH 2.80 01/21/2020       Anesthesia Plan    ASA Status:  2      Anesthesia Type: Epidural.              Consents    Anesthesia Plan(s) and associated risks, benefits, and realistic alternatives discussed. Questions answered and patient/representative(s) expressed understanding.     - Discussed with:  Patient         Postoperative Care            Comments:           neg OB ROS.       Eliseo Jeff MD

## 2021-04-15 NOTE — ANESTHESIA PROCEDURE NOTES
Epidural catheter Procedure Note  Pre-Procedure   Staff -        Anesthesiologist:  Eliseo Jeff MD       Performed By: anesthesiologist  Procedure DocumentationProcedure: epidural catheter  Comments:  Pre-Procedure  Performed by Eliseo Jeff MD  Location: OB.      PreAnesthestic Checklist: patient identified, IV checked, risks and benefits discussed, informed consent obtained, monitors and equipment checked, pre-op evaluation and at physician/surgeon's request.    Timeout   Correct Patient: Yes  Correct Procedure: Epidural catheter placement  Correct Site: Yes   Correct Position: Yes    Procedure Documentation  Procedure:   Epidural catheter block for Labor    Patient currently in labor and she and OBMD request a labor epidural to control her labor pains. Patient was interviewed and examined. Procedure and risks including but not limited to bleeding, infection, nerve injury, paralysis, PDPH, and inadequate block requiring intervention discussed with patient. Questions answered. This epidural is to be placed in anticipation of vaginal delivery.  She consents to the epidural procedure.  Time-out was performed.  I or my partners remain immediately available for management of any issues or complications and will monitor at appropriate intervals.  Procedure: Patient sitting. Betadine prep x 3. Sterile drape applied.  Lidocaine 1%  local infiltration at L 3-4.  17 G. Tuohy needle at L3-4 by loss of resistance into epidural space.  No CSF, paresthesia or blood. 1.5 % Lidocaine with 1:200,000 Epinephrine 5cc test dose. Then 0.25% bupivicaine 10 cc with NS 5 cc.  Epidural catheter inserted w/o resistance to 5 cm in epidural space.  Aspiration negative for blood and CSF.   Negative for neuro change, paresthesia or symptoms of intravascular injection or intrathecal injection.  Infusion orders written and infusion of 0.125% bupivicaine 15cc per hour started.    Eliseo Jeff MD

## 2021-04-15 NOTE — PROGRESS NOTES
"CNM PROGRESS NOTE  Late entry d/t patient care  SUBJECTIVE:      Assumed care of pt at 0800. Per RN, pt was checked at 0820 and noted to be 6cm/90%/-1 which was unchanged from 0615.     Karen is requesting an epidural. Stephen is at the bedside. There is a  on the ipad for our conversation.       OBJECTIVE:  /58   Pulse 88   Temp 97.4  F (36.3  C) (Oral)   Resp 18   Ht 1.651 m (5' 5\")   Wt 100.2 kg (221 lb)   LMP 07/10/2020 (Approximate)   BMI 36.78 kg/m      Fetal heart tones: Baseline 130s   Variability: moderate  Accelerations: absent  Decelerations: present - prolonged variable deceleration down to 70s shortly after epidural placement with slow return to baseline  Cat II tracing    Contractions: Pt is tia every 3-4 minutes, lasting 80 seconds and palpates strong    Cervix:   10cm / 100% / -1, Vtx  ROM: moderate meconium fluid    Pitocin- 9 mu/min.  Antibiotics- none  Cervical ripening: N/A    ASSESSMENT:  IUP @ 41w2d good progress and IOL for postdates   GBS- positive with adequate abx prophylaxis    GBS- positive  Obesity in pregnancy  Anemia in pregnancy s/p iron transfusions, 9.5 on admit  COVID + 4/5/21, off precautions  Suspected fetal macrosomia - EFW by US 4/5/21 3943g  Hx partial third degree laceration     PLAN:   Consulted on call OB Dr. Chong regarding plan of care. Dr. Chong and myself at the bedside during prolonged deceleration, which resolved with position changes. Pt complete per physician exam.   Adequate cervical change and FHTs recovered to Cat I  Okay to continue with CNM care per on call OB  Begin pushing   Dr. Chong will be available at time of delivery as needed       Adelina Fung CNM    "

## 2021-04-15 NOTE — PROGRESS NOTES
"CNM PROGRESS NOTE    SUBJECTIVE:  Patient reports that her contractions have stopped since starting the pitocin. 10 minute period of minimal variability and repetitive decels (< 30 seconds long) after contractions. Now resolved.    OBJECTIVE:  /75   Temp 97.7  F (36.5  C) (Oral)   Resp 18   Ht 1.651 m (5' 5\")   Wt 100.2 kg (221 lb)   LMP 07/10/2020 (Approximate)   BMI 36.78 kg/m      Fetal heart tones: Baseline 148   Variability:   Accelerations: present  Decelerations: absent    Contractions: Pt is tia every 1-7 minutes, lasting 45-60 seconds and palpates moderate    Cervix: 2.5/ 70% / -2, Vtx  ROM: not ruptured    Pitocin- 4 mu/min.  Antibiotics- PCN  Cervical ripening: N/A    ASSESSMENT:  IUP @ 41w1d IOL for postdates, BMI  GBS- positive     PLAN:     Prophylactic antibiotic for + GBS status  Anticipate   Labor induction with Pitocin  reevaluate in 2-4 hours/PRN    LUCIA LEWIS CNM    "

## 2021-04-15 NOTE — PROVIDER NOTIFICATION
04/15/21 0909   Provider Notification   Provider Name/Title Dr Chong & Adelina MEADE    Method of Notification At Bedside   Dr Chong at [facility] at bedside after epidural placement to SVE to determine if making change. Prolonged decel present for 4.5min while MD at bedside. Patient repositioned to left side and SVE 8cm. Patient refusing to keep external monitors in place. Has been counciled on importance of adequately monitoring FHR. Internal monitoring offered to patient; patient declines. IPAD  used throughout conversation.

## 2021-04-15 NOTE — PLAN OF CARE
Data: Karen Loya transferred to [id number] via wheelchair at 1420. Baby transferred via crib.  Action: Receiving unit notified of transfer: Yes. Patient and family notified of room change. Report given to Zunilda Noriega RN at 1510. Belongings sent to receiving unit. Accompanied by Registered Nurse. Oriented patient to surroundings. Call light within reach. ID bands double-checked with receiving RN.  Response: Patient tolerated transfer and is stable.

## 2021-04-15 NOTE — PROVIDER NOTIFICATION
04/14/21 1926   Provider Notification   Provider Name/Title HALINA Smith   Method of Notification At Bedside   Notification Reason SVE   Provider @ bedside when I assume care of the pt.  Using  to discuss POC with pt and sig. Other.  Will continue to monitor EFM tracing closely and adjust Pitocin accordingly.  CNM will remain on unit until next provider assumes call.

## 2021-04-15 NOTE — PLAN OF CARE
Spent over 30 min.with assistance of  discussing POC with pt and sig other.  Reviewed process of labor, Pitocin, questions about AROM, and induction process.  Listened to and answered questions, acknowledged pt frustrations and offered support.  Pt stated she felt we were listening to her questions and concerns and did understand her POC for now.

## 2021-04-15 NOTE — CONSULTS
[facility] Labor and Delivery consultation note    Karen Loya MRN# [id number]   Age: 26 year old YOB: 1995     Date of Admission:  2021    Primary care provider: No Ref-Primary, Physician           Chief Complaint:   Karen Loya is a 26 year old female who is  @ 41w2d pregnant and being induced for post-dates with slow progression of labor with occasional fetal heart rate decelerations.  covid recovered day 10 today. GBS positive on PCN.  Pitocin running.           Pregnancy history:     OBSTETRIC HISTORY:    OB History    Para Term  AB Living   2 1 1 0 0 1   SAB TAB Ectopic Multiple Live Births   0 0 0 0 1      # Outcome Date GA Lbr Robert/2nd Weight Sex Delivery Anes PTL Lv   2 Current            1 Term 19 40w0d  3.175 kg (7 lb) M Vag-Spont   MARLA      Birth Comments: partial 3rd degree lac, IOL for GHTN       EDC: Estimated Date of Delivery: 2021    Prenatal Labs:   Lab Results   Component Value Date    ABO O 2021    ABO O 2021    RH Pos 2021    RH Pos 2021    AS Neg 2021    HEPBANG Nonreactive 10/21/2020    CHPCRT Negative 10/21/2020    GCPCRT Negative 10/21/2020    HGB 9.5 (L) 2021       GBS Status:   Lab Results   Component Value Date    GBS Positive (A) 2021       Active Problem List  Patient Active Problem List   Diagnosis     History of pre-eclampsia in prior pregnancy, currently pregnant     Nausea/vomiting in pregnancy     History of maternal third degree perineal laceration, currently pregnant     BMI 38.0-38.9,adult     Fetal macrosomia in third trimester, single or unspecified fetus     GBS (group B Streptococcus carrier), +RV culture, currently pregnant     Supervision of high risk pregnancy in third trimester     Labor and delivery, indication for care     Anemia affecting pregnancy in third trimester     COVID-19 affecting pregnancy in third trimester     Post term pregnancy, antepartum      Post-dates pregnancy       Medication Prior to Admission  Medications Prior to Admission   Medication Sig Dispense Refill Last Dose     aspirin (ASA) 81 MG chewable tablet Take 81 mg by mouth daily        ferrous sulfate (FE TABS) 325 (65 Fe) MG EC tablet Take 1 tablet (325 mg) by mouth every other day 30 tablet 3      Vitamin D3 (CHOLECALCIFEROL) 25 mcg (1000 units) tablet Take by mouth daily      .        Maternal Past Medical History:     Past Medical History:   Diagnosis Date     No pertinent past medical history 09/16/2020                       Family History:   This patient has no significant family history            Social History:   This patient has no significant social history         Review of Systems:   CONSTITUTIONAL: NEGATIVE for fever, chills, change in weight  INTEGUMENTARY/SKIN: NEGATIVE for worrisome rashes, moles or lesions  EYES: NEGATIVE for vision changes or irritation  ENT/MOUTH: NEGATIVE for ear, mouth and throat problems  RESP: NEGATIVE for significant cough or SOB  BREAST: NEGATIVE for masses, tenderness or discharge  CV: NEGATIVE for chest pain, palpitations or peripheral edema  GI: NEGATIVE for nausea, abdominal pain, heartburn, or change in bowel habits  : NEGATIVE for frequency, dysuria, or hematuria  MUSCULOSKELETAL: NEGATIVE for significant arthralgias or myalgia  NEURO: NEGATIVE for weakness, dizziness or paresthesias  ENDOCRINE: NEGATIVE for temperature intolerance, skin/hair changes  HEME: NEGATIVE for bleeding problems  PSYCHIATRIC: NEGATIVE for changes in mood or affect          Physical Exam:     Vitals were reviewed  All vitals stable  Patient Vitals for the past 8 hrs:   BP Temp Temp src Resp   04/15/21 0835 -- 97.4  F (36.3  C) Oral --   04/15/21 0719 112/58 97.5  F (36.4  C) Oral 18   04/15/21 0431 124/62 98.1  F (36.7  C) Oral 18     Constitutional: Awake, alert, cooperative, no apparent distress, and appears stated age.  Eyes: Lids and lashes normal, pupils equal,  round and reactive to light, extra ocular muscles intact, sclera clear, conjunctiva normal.  ENT: Normocephalic, without obvious abnormality, atramatic, sinuses nontender on palpation, external ears without lesions, oral pharynx with moist mucus membranes, tonsils without erythema or exudates, gums normal and good dentition.  Neck: Supple, symmetrical, trachea midline, no adenopathy, thyroid symmetric, not enlarged and no tenderness, skin normal.  Hematologic / Lymphatic: No cervical lymphadenopathy and no supraclavicular lymphadenopathy.  Back: Symmetric, no curvature, spinous processes are non-tender on palpation, paraspinous muscles are non-tender on palpation, no costal vertebral tenderness.  Abdomen: No scars, normal bowel sounds, soft, non-distended, non-tender, no masses palpated, no hepatosplenomegally.  Genitourinary: No urethral discharge, normal external genitalia, no hernia.  Musculoskeletal: No redness, warmth, or swelling of the joints.  Full range of motion noted.  Motor strength is 5 out of 5 all extremities bilaterally.  Tone is normal.  Neurologic: Awake, alert, oriented to name, place and time.  Cranial nerves II-XII are grossly intact.  Motor is 5 out of 5 bilaterally.  Cerebellar finger to nose, heel to shin intact.  Sensory is intact.  Babinski down going, Romberg negative, and gait is normal.  Neuropsychiatric: Normal affect, mood, orientation, memory and insight.  Skin: No rashes, erythema, pallor, petechia or purpura.   Cervix:   Membranes: AROM   Dilation: 8 then rapdily to 10    Position: Mid  Presentation:Cephalic  Fetal Heart Rate Tracing: Tier 2 (indeterminate) decelerations seen now   Tocometer: frequency q 2-3 minutes                       Assessment:   Karen Loya is a 26 year old female who is  @ 41w2d pregnant and being induced for post-dates with slow progression of labor with occasional fetal heart rate decelerations.  covid recovered day 10 today. GBS positive on PCN.   Pitocin running.           Plan:   Admit - see IP orders  Patient is now almost completely dilated. Fetal heart tones have recovered with good variability and valise is 150s.  Patient was offered, but declined internal monitoring.  At this point I will remain on the labor and delivery floor, but since the labor has now progressed and the heart rate has recovered, it is safe for the CNM to continue management.  Anticipate vaginal delivery.    I will be available if I am needed further.        Nat Chong, DO

## 2021-04-15 NOTE — PROCEDURES
I was called to the room to evaluate for 3rd degree laceration after induced vaginal delivery of the midwife service.  The patient had a history of a 3rd degree laceration with her previous vaginal delivery a few years ago.       Verbal informed consent obtained for vaginal laceration repair and delivery of the placenta was obtained through somalian  on the ipad.      A 3rd degree tear was present. The rectal sphincter is completely ruptured and it is located bilaterally and allis clamps are placed on them bilaterally.  The tissue is attenuated.  Placenta delivered spontaneously with gentle traction.  Bleeding is noted and treated with rectal misoprostol. The vaginal laceration, rectal sphincter is repaired superiorly with 4 figure of 8 sutures with 0 vicryl. The remaining laceration was repaired with 3-0 Monocryl and lidocaine/epidural/IV fentanyl for anesthesia. The anus/rectum was examined with a rectal exam and the rectal sphincter was intact.  No sutures are noted in the vagina.      I discussed recommendation of stool softeners for a month after this type of laceration and repair to prevent constipation which could result it poor healing.    Also recommend baths to allow the area to be kept clean.    Due to the repair, recommend IV antibiotic x 24 hours.  Orders are placed.     POWER ELDRIDGE DO

## 2021-04-15 NOTE — PROGRESS NOTES
"CNM PROGRESS NOTE    SUBJECTIVE:  Karen is coping well with ctx.  States they feel stronger.  Agrees to SVE but declines AROM.  Reviewed that we may have to change her position or discontinue pitocin if FHR continues to be category 2.  Recalls first baby was said to be large, but was born about 7.5lbs.  This baby estimates to be about 9lb.      OBJECTIVE:  /75   Temp 97.7  F (36.5  C) (Oral)   Resp 18   Ht 1.651 m (5' 5\")   Wt 100.2 kg (221 lb)   LMP 07/10/2020 (Approximate)   BMI 36.78 kg/m      Fetal heart tones: Baseline 155   Variability: moderate currently, with periods of minimal variability  Accelerations: present  Decelerations: present,  lates x3 at 2315 with non recurrent late and non significant variable decels    Contractions: Pt is tia every 1.5-8 minutes, lasting  seconds and palpates moderate    Cervix: 3/ 60% / -2, soft, mid Vtx.  No bulging/filling bag felt.  ROM: intact    Pitocin- 8 mu/min.  Antibiotics- PCN  Cervical ripening: N/A    ASSESSMENT:  IUP @ 41w1d IOL for postdates   Category 2 FHTs  Suspected LGA infant: EFW by US 4/5/21 3943g  GBS- positive  Obesity in pregnancy  Anemia in pregnancy s/p iron transfusions, 9.5 today  COVID + 4/5/21  Suspected fetal macrosomia  Hx preE with severe features in previous pregnancy  Hx partial third degree laceration    PLAN:   -May continue to observe per Lon Category 2 FHR algorithm (moderate variability and/or accels, latent phase, < 50% of contractions with significant decels for 1 hour)  -Continue titrating pitocin per protocol as able  -Continue to discuss AROM to help progress labor  -Continue GBS antibiotics   -Intrauterine corrective measures as needed  -Consult OB for persistent category 2 FHTs according to Lon Category 2 FHT management algorithm  -Reevaluate in 2-4 hours/PRN    Maile Kay CNM    "

## 2021-04-15 NOTE — PLAN OF CARE
Due to pt repositioning toco, its difficult to tell  contraction pattern, is there coupling or clustering present, or 3 minute long contractions. And then it is difficult to know if there is increased variability during this time or prolonged decels.     Pt was repositioned again, and once again reminded to not touch monitors.  500 ml  bolus was also initiated at this time. Will continue to monitor closely

## 2021-04-15 NOTE — PROGRESS NOTES
"CNM PROGRESS NOTE    SUBJECTIVE:  Professional Gareth  used with iPad for this encounter. Karen is stating she is not feeling ctx as much as she did when she first arrived.  States the pitocin in making them go away.  Desires to discontinue pitocin and \"give me a different medication that will break my water because this is not working.\"  Explained that it takes time to get her body into labor and that AROM would likely be effective to help progress labor.  Explained procedure for AROM and she declines, stating she wants medication that will break her water.  Reports that she had contractions on the way here where she thought baby was going to come right away and the contractions stopped as soon as we started pitocin.  Explained that we would be watching FHTs closely as we are seeing late decelerations when it appears that baby is in a sleep cycle.  Explained that as labor gets stronger, baby may tolerate those contractions less.  , Stephen, asking if we can continue to increase her pitocin at this time to try to achieve stronger contractions.    Also, charge RN notified CNM that house supervisor confirmed that COVID precautions could be removed at midnight.      OBJECTIVE:  /75   Temp 97.7  F (36.5  C) (Oral)   Resp 18   Ht 1.651 m (5' 5\")   Wt 100.2 kg (221 lb)   LMP 07/10/2020 (Approximate)   BMI 36.78 kg/m      Fetal heart tones: Baseline 140  Variability: periods of minimal with late decelerations and periods of moderate variability   Accelerations: absent  Decelerations: present, late that are recurrent when minimal variability is present, non recurrent late/non significant variable decel    Contractions: Pt is tia every 2-7 minutes, lasting  seconds and palpates moderate    Cervix: deferred  ROM: not ruptured    Pitocin- 4 mu/min just increased to 6mu/min  Antibiotics- PCN  Cervical ripening: N/A    ASSESSMENT:  IUP @ 41w1d IOL for postdates   Category 2 FHTs  GBS- " positive  Obesity in pregnancy  Anemia in pregnancy s/p iron transfusions, 9.5 today  COVID + 4/5/21  Suspected fetal macrosomia  Hx preE with severe features in previous pregnancy  Hx partial third degree laceration       PLAN:   -Continue titrating pitocin per protocol  -Continue to discuss AROM to help progress labor  -Continue GBS antibiotics   -Intrauterine corrective measures as needed  -Consult OB for persistent category 2 FHTs according to Lon Category 2 FHT management algorithm  -Reevaluate in 2-4 hours/PRN    Maile Kay CNM

## 2021-04-15 NOTE — PROVIDER NOTIFICATION
04/15/21 1125   Provider Notification   Provider Name/Title Dr Chong   Method of Notification At Bedside   Request Evaluate in Person   Dr Chong at bedside to evaluate for 3rd degree repair

## 2021-04-15 NOTE — PLAN OF CARE
Pt is frequently adjusting and moving EFM monitors, making it difficult at times to obtain consistent tracings.  She has been educated with the  about the importance of not moving the monitors and she has acknowledged this education, but continues with the behavior.  Pt will also reposition herself after she has been positioned to help improve  FHR

## 2021-04-15 NOTE — PROVIDER NOTIFICATION
04/14/21 3359   Provider Notification   Provider Name/Title HALINA Kay   Method of Notification Phone   Request Evaluate in Person   Notification Reason Decels   CNM called informed of recent decels and requested her to come to bedside to assess pt.

## 2021-04-15 NOTE — L&D DELIVERY NOTE
OB Vaginal Delivery Note    Karen Loya MRN# [id number]   Age: 26 year old YOB: 1995       GA: 41w2d  GP:   Labor Complications: None   EBL:   mL  Delivery QBL:    Delivery Type: Vaginal, Spontaneous   ROM to Delivery Time: (Delivered) Hours: 5 Minutes: 7  [place] Weight: 3.82 kg (8 lb 6.8 oz)    1 Minute 5 Minute 10 Minute   Apgar Totals: 8   9        AJNET HERRON;SHERIE GABRIEL     Delivery Details:  Karen Loya, a 26 year old  female delivered a viable infant with apgars of 8  and 9 . Patient was fully dilated and pushing after   hours   minutes in active labor. Delivery was via vaginal, spontaneous  to a sterile field under   anesthesia. Infant delivered in vertex  right  occiput  anterior  position. Anterior and posterior shoulders delivered without difficulty.   NICU was present for delivery d/t presence of meconium in amniotic fluid, however infant was vigorous after birth so NICU excused themselves.       The cord was clamped after it ceased pulsing, cut by FOB and 3 vessels  were noted. Cord blood was obtained in routine fashion with the following disposition: lab .      Cord complications: none   Placenta delivered by Dr. Chong while she was assessing vaginal laceration 4/15/2021 11:36 AM . Placental disposition was Hospital disposal   Fundal massage performed and fundus found to be firm.     Episiotomy: none    Perineum, vagina, cervix were inspected, and the following lacerations were noted:   Perineal lacerations: 3rd   periurethral laceration: bilateral             Lacerations examined and repaired by Dr. Chong, see her note for details.     Excellent hemostasis was noted. Needle count correct. Infant and patient in delivery room in good and stable condition.        Sage Loya-Karen [[id number]]    Labor Event Times    Dilation complete date: 4/15/21 Complete time:  9:17 AM   Start pushing date/time: 4/15/2021 0932      Labor Events      labor?: No   steroids: None  Labor Type: Induction/Cervical ripening  Predominate monitoring during 1st stage: continuous electronic fetal monitoring     Antibiotics received during labor?: Yes  Reason for Antibiotics: GBS  Antibiotics received for GBS: Penicillin     Rupture date/time: 4/15/21 0615   Rupture type: Spontaneous rupture of membranes occuring during spontaneous labor or augmentation  Fluid color: Meconium  Fluid odor: Normal     Induction: Oxytocin  Induction date/time:     Cervical ripening date/time:     Indications for induction: Post-term Gestation     Augmentation: Oxytocin  Indications for augmentation: Ineffective Contraction Pattern     Delivery/Placenta Date and Time    Delivery Date: 4/15/21 Delivery Time: 11:22 AM   Placenta Date/Time: 4/15/2021 11:36 AM  Oxytocin given at the time of delivery: after delivery of baby  Delivering clinician: Adelina Fung CNM   Other personnel present at delivery:  Provider Role   Nora Vargas RN Miller, ALLEN Rosales          Vaginal Counts     Initial count performed by 2 team members:  Two Team Members   Adelina Stauffer RN       [place] Suture Needles Sponges (RETIRED) Instruments   Initial counts 2  5    Added to count  4 13    Relief counts       Final counts             Placed during labor Accounted for at the end of labor   FSE No    IUPC No    Cervadil No                      Apgars    Living status: Living   1 Minute 5 Minute 10 Minute 15 Minute 20 Minute   Skin color: 0  1       Heart rate: 2  2       Reflex irritability: 2  2       Muscle tone: 2  2       Respiratory effort: 2  2       Total: 8  9       Apgars assigned by: AYAN MON RN     Cord    Vessels: 3 Vessels    Cord Complications: None               Cord Blood Disposition: Lab    Gases Sent?: No    Delayed cord clamping?: Yes    Cord Clamping Delay (seconds): 31-60 seconds        Resuscitation    [facility] Care at Delivery: Delivery Clinician:   Adelina Fung  "NP Requested NNP attend   through meconium stained fluid.   Spontaneous respirations, stimulatued to cry by drying skin with blankets, suction with bulb syringe.Team dismissed when infant on mothers abdomen.  Infant doing well and left in room with nurse and family.  Romel Salazar LAXMI CNNP MSN 11:31 AM, 04/15/2021        Measurements    Weight: 8 lb 6.8 oz Length: 1' 8.5\"   Head circumference: 36.8 cm       Skin to Skin and Feeding Plan    Skin to skin initiation date/time: 1841    Skin to skin with: Mother  Skin to skin end date/time:        Labor Events and Shoulder Dystocia    Fetal Tracing Prior to Delivery: Category 1  Shoulder dystocia present?: Neg     Delivery (Maternal) (Provider to Complete) ([id number])    Episiotomy: None  Perineal lacerations: 3rd    Periurethral laceration: bilateral Repaired?: Yes   Repair suture: 0 Vicryl, 3-0 Monocryl     Blood Loss  Mother: Karen Loya #[id number]   Start of Mother's Information    IO Blood Loss  21 2322 - 04/15/21 1340    None           End of Mother's Information  Mother: Karen Loya #[id number]          Delivery - Provider to Complete ([id number])    Delivering clinician: Adelina Fung CNM CNM Care: Any CNM care in labor  Delivery Type (Choose the 1 that will go to the Birth History): Vaginal, Spontaneous                   Other personnel:  Provider Role   Nora Vargas, Bobby Keller RN                 Placenta    Date/Time: 4/15/2021 11:36 AM  Removal: Spontaneous  Disposition: Hospital disposal           Presentation and Position    Presentation: Vertex    Position: Right Occiput Anterior                 Adelina Fung CNM  "

## 2021-04-15 NOTE — PROGRESS NOTES
"CNM PROGRESS NOTE    SUBJECTIVE:  Karen is coping well with contractions.  Reports that they are stronger.  Visibly wincing with ctx.  Per Kandy bedside nurse, Karen is frequently moving and changing the monitors despite asking her not to, so accuracy of ctx pattern is suspect at times, and FHR not consistently connected.  She denies need for pain medication at this time.  She was given a 500cc bolus of LR at 0130.  Agrees to SVE in 2 hours.      OBJECTIVE:  /75   Temp 97.7  F (36.5  C) (Oral)   Resp 18   Ht 1.651 m (5' 5\")   Wt 100.2 kg (221 lb)   LMP 07/10/2020 (Approximate)   BMI 36.78 kg/m      Fetal heart tones: Baseline 135   Variability: periods of both moderate and minimal variability  Accelerations: present  Decelerations: present, recurrent late decels 7394-8259, then occasional late decel 0222-6338  Category 1 FHTs [id number].    Contractions: Pt is tia every 1.5-5 minutes, lasting  seconds and palpates moderate    Cervix: Deferred  ROM: not ruptured    Pitocin- 9 mu/min.  Antibiotics- PCN  Cervical ripening: N/A    ASSESSMENT:  IUP @ 41w1d IOL for postdates   Category 2 FHTs  Suspected LGA infant: EFW by US 4/5/21 3943g  GBS- positive  Obesity in pregnancy  Anemia in pregnancy s/p iron transfusions, 9.5 on admit  COVID + 4/5/21  Suspected fetal macrosomia  Hx preE with severe features in previous pregnancy  Hx partial third degree laceration     PLAN:   -May continue to observe per Lon Category 2 FHR algorithm (moderate variability and/or accels, latent phase, < 50% of contractions with significant decels for 1 hour)  -Continue titrating pitocin per protocol as able  -Continue to discuss AROM to help progress labor  -Continue GBS antibiotics   -Intrauterine corrective measures as needed  -Consult OB for persistent category 2 FHTs according to Lon Category 2 FHT management algorithm  -Reevaluate in 2-4 hours/PRN    Maile Kay CNM    "

## 2021-04-15 NOTE — PROGRESS NOTES
"[facility] PROGRESS NOTE    SUBJECTIVE:  Karen is coping well with contractions.  Did receive some relief from fentanyl and now desires another dose.  Agreeable to SVE.    OBJECTIVE:  /75   Temp 97.7  F (36.5  C) (Oral)   Resp 18   Ht 1.651 m (5' 5\")   Wt 100.2 kg (221 lb)   LMP 07/10/2020 (Approximate)   BMI 36.78 kg/m      Fetal heart tones: Baseline 135  Variability: periods of moderate and minimal variability  Accelerations: present  Decelerations: present, non recurrent late decelerations    Contractions: Pt is tia every 2 minutes, lasting 60-80 seconds and palpates strong    Cervix: 6/ 90% / -2, Vtx does not feel well applied   ROM: SROM with exam for moderate meconium fluid    Pitocin- 9 mu/min.  Antibiotics- PCN  Cervical ripening: N/A    ASSESSMENT:  IUP @ 41w1d IOL for postdates   Category 1/2 FHTs  Suspected LGA infant  Entering active labor: EFW by US 4/5/21 3943g  GBS- positive  Obesity in pregnancy  Anemia in pregnancy s/p iron transfusions, 9.5 on admit  COVID + 4/5/21  Suspected fetal macrosomia  Hx preE with severe features in previous pregnancy  Hx partial third degree laceration     PLAN:   -May continue to observe per Lon Category 2 FHR algorithm (moderate variability and/or accels, latent phase, < 50% of contractions with significant decels for 1 hour) now with change in SVE  -IV fentanyl per pt wishes  -Continue titrating pitocin per protocol as able  -Continue GBS antibiotics   -Intrauterine corrective measures as needed  -Consult OB for persistent category 2 FHTs according to Lon Category 2 FHT management algorithm  -Reevaluate in 2-4 hours/PRN    Maile Kay CNM    "

## 2021-04-16 LAB — HGB BLD-MCNC: 7.6 G/DL (ref 11.7–15.7)

## 2021-04-16 PROCEDURE — 999N000079 HC STATISTIC IP LACTATION SERVICES 1-15 MIN

## 2021-04-16 PROCEDURE — 250N000013 HC RX MED GY IP 250 OP 250 PS 637: Performed by: ADVANCED PRACTICE MIDWIFE

## 2021-04-16 PROCEDURE — 250N000011 HC RX IP 250 OP 636: Performed by: FAMILY MEDICINE

## 2021-04-16 PROCEDURE — 85018 HEMOGLOBIN: CPT | Performed by: ADVANCED PRACTICE MIDWIFE

## 2021-04-16 PROCEDURE — 120N000001 HC R&B MED SURG/OB

## 2021-04-16 PROCEDURE — 36415 COLL VENOUS BLD VENIPUNCTURE: CPT | Performed by: ADVANCED PRACTICE MIDWIFE

## 2021-04-16 RX ADMIN — ACETAMINOPHEN 650 MG: 325 TABLET, FILM COATED ORAL at 08:54

## 2021-04-16 RX ADMIN — CEFAZOLIN 1 G: 1 INJECTION, POWDER, FOR SOLUTION INTRAMUSCULAR; INTRAVENOUS at 06:52

## 2021-04-16 RX ADMIN — DOCUSATE SODIUM 50 MG AND SENNOSIDES 8.6 MG 1 TABLET: 8.6; 5 TABLET, FILM COATED ORAL at 21:18

## 2021-04-16 RX ADMIN — ACETAMINOPHEN 650 MG: 325 TABLET, FILM COATED ORAL at 01:32

## 2021-04-16 RX ADMIN — ACETAMINOPHEN 650 MG: 325 TABLET, FILM COATED ORAL at 16:07

## 2021-04-16 RX ADMIN — ACETAMINOPHEN 650 MG: 325 TABLET, FILM COATED ORAL at 21:22

## 2021-04-16 RX ADMIN — FERROUS SULFATE TAB 325 MG (65 MG ELEMENTAL FE) 325 MG: 325 (65 FE) TAB at 08:53

## 2021-04-16 RX ADMIN — IBUPROFEN 800 MG: 800 TABLET, FILM COATED ORAL at 05:38

## 2021-04-16 RX ADMIN — DOCUSATE SODIUM 50 MG AND SENNOSIDES 8.6 MG 1 TABLET: 8.6; 5 TABLET, FILM COATED ORAL at 08:53

## 2021-04-16 RX ADMIN — IBUPROFEN 800 MG: 800 TABLET, FILM COATED ORAL at 16:07

## 2021-04-16 NOTE — PLAN OF CARE
Blood pressures lower this shift, slightly elevated temperatures, fluid bolus given, see previous provider notification note.  Labs done, oral iron started.  Stand by assist due to perineum discomfort when walking. Declined pain medication until end of shift.  Voiding without difficulty.  Small trickle of blood with fundal checks, MD aware.  Breastfeeding support given, hand expression shown, patient education given and verbalizes understanding.  Spouse at bedside and supportive.  Patients mobililty level scored using the bedside mobility assistance tool (BMAT). Patient is at a mobility level test number: 3. Mobility equipment used: none required. Required assist of 1 staff members. Further use of BMAT scoring required.

## 2021-04-16 NOTE — ANESTHESIA POSTPROCEDURE EVALUATION
Patient: Karen Loya    * No procedures listed *    Diagnosis:* No pre-op diagnosis entered *  Diagnosis Additional Information: No value filed.    Anesthesia Type:  Epidural    Note:     Postop Pain Control: Uneventful            Sign Out: Well controlled pain   PONV: No   Neuro/Psych: Uneventful            Sign Out: Acceptable/Baseline neuro status   Airway/Respiratory: Uneventful            Sign Out: Acceptable/Baseline resp. status   CV/Hemodynamics: Uneventful            Sign Out: Acceptable CV status   Other NRE: NONE   DID A NON-ROUTINE EVENT OCCUR? No    Event details/Postop Comments:  .Labor Epidural Post delivery note.      Doing well. VSS Temp normal. Satisfactory respiratory and cardiovascular function. Return of neurologic function. Questions encouraged and answered. Denies positional headache. Minimal side effects easily managed w/ PRN meds. No apparent anesthetic complications. No follow-up required.    I or my partner was immediately available for epidural management.    CYNDI Correa           Last vitals:  Vitals:    04/15/21 1918 04/15/21 2122 04/16/21 0219   BP: (!) 88/54 91/50 116/63   Pulse: 91 87 83   Resp: 20 18 18   Temp: 100  F (37.8  C) 99.7  F (37.6  C) 97.9  F (36.6  C)       Last vitals prior to Anesthesia Care Transfer:      Electronically Signed By: Jose Correa DO  April 16, 2021  8:20 AM

## 2021-04-16 NOTE — PROVIDER NOTIFICATION
"   04/15/21 1253   Provider Notification   Provider Name/Title Adelina Fung   Method of Notification Phone   Request Evaluate-Remote   Notification Reason Lab Results;Status Update   Notified of 7.8 hemoglobin and CBC results.  BP 91/50 after fluid bolus.  Patient reports being \"tired\", no other symptoms at this time.  Orders received to start 325 mg of oral iron once daily.  "

## 2021-04-16 NOTE — PROVIDER NOTIFICATION
04/15/21 1930   Provider Notification   Provider Name/Title Adelina Fung   Method of Notification Phone   Request Evaluate-Remote   Notification Reason Lab Results;Vital Signs Change;Status Update   Notified of blood pressure 88/54, pulse rate 91.  Temperature is 100.0.  Patient is very tender with fundal checks and has a small trickle of blood but is firm at 1 below umbilicus, no clots.  Orders received for CBC with diff and NaCl bolus of 500 mL over 30 minutes.  Call provider with CBC results.

## 2021-04-16 NOTE — PROVIDER NOTIFICATION
04/16/21 1540   Provider Notification   Provider Name/Title Dr. Jeff   Method of Notification Phone   Request Evaluate in Person   Notification Reason Pain  (headache)     Requested Dr. Jeff to evaluate headache.  Patient c/o neck pain and headache, rates 5/10, improvement while supine, worse when sitting.  Dr. Jeff will come to evaluate shortly.

## 2021-04-16 NOTE — LACTATION NOTE
Lactation visit.  circumcised this morning. Sleeping in bassinet at time of visit. She declined utilizing  phone, and denies any breast feeding issues via her . Encouraged them to call for any questions or latching assessment.

## 2021-04-16 NOTE — PLAN OF CARE
Blood pressures stabilized this shift. Using Tylenol and Ibuprofen for pain control. Abdomen continues to be tender during fundal checks, firm with scant bleeding. Using ice packs for perineum. SBA to bathroom. Breastfeeding independently. Encouraged to call for assistance.

## 2021-04-16 NOTE — PROGRESS NOTES
"[facility] Progress Note: Postpartum Day #1    2021  10:14 AM    SUBJECTIVE:  Patient is stable and is tolerating acitivity well, requiring assistance at present time to get up to the bathroom.  Baby is rooming in  Complications since 2 hours post delivery: Anemia, postpartum hemoglobin 7.6 this morning. She is asymptomatic, had one low blood pressure yesterday, Bps have since been normal. She is ambulating without dizziness, SOB, heart palpitations.  Pt was found to be positive for COVID-19 and asymptomatic 10 days ago. She is outside of the window where quarantine is recommended per CDC guidelines.    Pain is well controlled.  Patient is taking pain medications.  Breastfeeding status:initiated   Elimination:  She is voiding without difficulty.  She has not had a bowel movement  Desired contraception Unsure, she would like to discuss further at postpartum visit.  Denies heavy bleeding and passing large clots.  Feels good about birth experience.    OBJECTIVE:  /44   Pulse 74   Temp 98.8  F (37.1  C) (Oral)   Resp 18   Ht 1.651 m (5' 5\")   Wt 100.2 kg (221 lb)   LMP 07/10/2020 (Approximate)   Breastfeeding Unknown   BMI 36.78 kg/m      Constitutional: healthy, alert and no distress    Breasts: Currently breastfeeding    Fundus: Uterine fundus is firm, tenderness to palpation and at 1 below the level of the umbilicus    Perineum: Perineum is well approximated, minimal swelling    Lochia: Lochia is appropriate for the duration of time since delivery.     ASSESSMENT:  PPD #1    3rd degree laceration repair  Doing well.  Anemia, asymptomatic   COVID-19 positive within last 14 days   Hemoglobin   Date Value Ref Range Status   2021 7.6 (L) 11.7 - 15.7 g/dL Final   ]      PLAN:  #Routine postpartum care:  -Discussed indications for TDAP vaccine postpartum as she did not receive it during pregnancy, she declines at this time.   -Continue routine care, heating pads for " cramping  -Ambulation encouraged  -Reviewed breastfeeding  -Reviewed postpartum warning signs  -Reviewed postpartum blues and postpartum depression warning signs  -Plans unsure for contraception postpartum, will revisit at 2 and 6 week postpartum visits   -Anticipated discharge tomorrow if stable     #S/P 3rd degree laceration repair:  -Pt received IV antibiotics postpartum   -Stool softeners to be given in hospital as well as upon discharge     #Anemia:  -Hgb stable, asymptomatic, bleeding stable  -We discussed the option for IV iron transfusion or to be discharged home with oral iron supplements. She declines IV iron transfusion, accepting of PO iron supplements.    #COVID-19 positive within the last 14 days:   -Discussed increased risk for blood clots in postpartum women who are COVID-19 positive and asymptomatic and rationale for anticoagulation during postpartum period.   -We reviewed current [facility] recommendation for Lovenox for 6 weeks in postpartum women found to be positive for COVID-19 within 14 days of delivery.  -She declines Lovenox injections both inpatient as well as after discharge from the hospital     VINOD Wilkins CNM 4/16/2021 10:49 AM

## 2021-04-16 NOTE — PROGRESS NOTES
"Called to bedside by RN. Asked to eval for spinal HA.     Pt says she has \"dull pain in my neck and shoulders and upper back.\" She had said she felt it was worse when sitting to the RN but now says that is not so. She denies rhinorrhea, tinnitus, or scotomas.     I believe this is not a spinal HA, more likely stress HA and muscle inflamation. I suggested OTC pain relief, fluids, and caffeine. I will f/u with pt tomorrow morning.    Eliseo Jeff MD  "

## 2021-04-17 VITALS
SYSTOLIC BLOOD PRESSURE: 132 MMHG | RESPIRATION RATE: 14 BRPM | WEIGHT: 221 LBS | HEIGHT: 65 IN | HEART RATE: 77 BPM | BODY MASS INDEX: 36.82 KG/M2 | TEMPERATURE: 97.6 F | DIASTOLIC BLOOD PRESSURE: 59 MMHG

## 2021-04-17 PROCEDURE — 250N000013 HC RX MED GY IP 250 OP 250 PS 637: Performed by: ADVANCED PRACTICE MIDWIFE

## 2021-04-17 RX ORDER — FERROUS SULFATE 325(65) MG
325 TABLET ORAL DAILY
Qty: 60 TABLET | Refills: 1 | Status: SHIPPED | OUTPATIENT
Start: 2021-04-18 | End: 2021-12-09

## 2021-04-17 RX ORDER — BENZOCAINE AND LEVOMENTHOL 200; 5 MG/G; MG/G
1 SPRAY TOPICAL EVERY 4 HOURS PRN
Qty: 1 G | Refills: 0 | Status: SHIPPED | OUTPATIENT
Start: 2021-04-17 | End: 2021-12-09

## 2021-04-17 RX ORDER — ACETAMINOPHEN 325 MG/1
650 TABLET ORAL EVERY 4 HOURS PRN
Qty: 30 TABLET | Refills: 1 | Status: SHIPPED | OUTPATIENT
Start: 2021-04-17 | End: 2021-12-09

## 2021-04-17 RX ORDER — AMOXICILLIN 250 MG
1-2 CAPSULE ORAL 2 TIMES DAILY PRN
Qty: 60 TABLET | Refills: 1 | Status: SHIPPED | OUTPATIENT
Start: 2021-04-17 | End: 2021-12-09

## 2021-04-17 RX ORDER — IBUPROFEN 800 MG/1
800 TABLET, FILM COATED ORAL EVERY 6 HOURS PRN
Qty: 30 TABLET | Refills: 1 | Status: SHIPPED | OUTPATIENT
Start: 2021-04-17 | End: 2021-12-09

## 2021-04-17 RX ADMIN — DOCUSATE SODIUM 50 MG AND SENNOSIDES 8.6 MG 1 TABLET: 8.6; 5 TABLET, FILM COATED ORAL at 08:57

## 2021-04-17 RX ADMIN — ACETAMINOPHEN 650 MG: 325 TABLET, FILM COATED ORAL at 09:35

## 2021-04-17 RX ADMIN — FERROUS SULFATE TAB 325 MG (65 MG ELEMENTAL FE) 325 MG: 325 (65 FE) TAB at 08:57

## 2021-04-17 RX ADMIN — ACETAMINOPHEN 650 MG: 325 TABLET, FILM COATED ORAL at 02:03

## 2021-04-17 NOTE — PLAN OF CARE
Data: Vital signs within normal limits. Postpartum checks within normal limits - see flow record. Patient eating and drinking normally. Patient able to empty bladder independently and is up ambulating. No apparent signs of infection, healing well. Patient performing self cares and is able to care for infant.  Action: Taking tylenol and ibuprofen for pain/comfort when needed. See MAR.   Response: Positive attachment behaviors observed with infant. FOB present in room, supportive and helpful with cares.  FOB translated for pt during stay. Review of care plan, teaching and discharge instructions done with pt and pt's significant other.  All questions and concerns addressed.    Plan: Pt discharged with baby today at 1330.

## 2021-04-17 NOTE — PLAN OF CARE
Patient vital signs stable and meeting expected outcomes.  Breastfeeding well with minimal assistance from staff.  Up independently and voiding adequately.  Pain controlled with tylenol, ice packs, and tucks pads.  Able to perform all cares for self and infant.   Bonding well with baby.   present and supportive at bedside and interpreting for patient.  Plan to discharge home today.  Will continue to monitor.

## 2021-04-17 NOTE — PLAN OF CARE
Patient requesting assistance today due to perineal discomfort.  Pain well controlled with Tylenol, Motrin, tucks pads and ice packs.  Patient is also using tpump and abdominal binder for abdominal cramping.  C/O ongoing neck pain/headache.  Was evaluated by RAMIREZ who determined it was musculoskeletal, recommended PO pain medication, ice/heat, caffeine.  Hgb 7.6 today, patient denies s/s of anemia.  Declined iron transfusion.  Patient declined recommended lovenox for +COVID on 4/5, has been asymptomatic.  Declines Tdap vacine at this time.  Is requesting dermoplast spray for discharge.   Stephen supportive at bedside.  Patient and partner are not legally , they are aware of ROP paperwork.  Will continue to monitor, anticipate discharge home with infant tomorrow.

## 2021-04-17 NOTE — DISCHARGE SUMMARY
"[facility]    Discharge Summary  Obstetrics    Date of Admission:  2021  Date of Discharge:  2021  Discharging Provider: Kyara Ramirez  Date of Service (when I saw the patient): -------NOTE: \"Date of Service\" should now be entered     Discharge Diagnoses     3rd Deg laceration  Postpartum anemia     History of Present Illness   Karen Loya is a 26 year old female who presented with induction of labor secondary to high prepregnancy BMI and 41 weeks gestation    Hospital Course   The patient's hospital course was unremarkable.  She recovered as anticipated and experienced no post-delivery complications.  On discharge, her pain was well controlled. Vaginal bleeding is stable.  Voiding without difficulty.  Ambulating well and tolerating a normal diet.  No fever.  Breastfeeding well.  Infant is stable.  Denies Dizziness, SOB, heart palpitations. Patient s/t COVID positive 10 days ago outside of window for quarantine per CDC guidelines.  Birth control undecided, plan to discuss at PP visit. Instructed to f/u @2 weeks and 6 weeks.    Patient declines recommended Lovenox.     She was discharged on post-partum day #2.    Post-partum hemoglobin:   Hemoglobin   Date Value Ref Range Status   2021 7.6 (L) 11.7 - 15.7 g/dL Final       Kyara Ramirez, APRN CN    Discharge Disposition   Discharged to home   Condition at discharge: Stable    #S/P 3rd degree laceration repair:  -Pt received IV antibiotics postpartum   -Stool softeners prescription at discharge. Reviewed importance of keeping stool soft d/t 3rd deg lac while taking iron. Increase stool softener if needing to strain to have a bm.      #Anemia:  -Hgb stable, asymptomatic, bleeding stable  - accepting of PO iron supplements, ordered for discharge medication      #COVID-19 positive within the last 14 days:   -Patient has been advised of increased risk for blood clots in postpartum women who are COVID-19 positive and " asymptomatic and rationale for anticoagulation during postpartum period.   -was advised current [facility] recommendation for Lovenox for 6 weeks in postpartum women found to be positive for COVID-19 within 14 days of delivery.  -She declines Lovenox injections both inpatient as well as after discharge from the hospital      Primary Care Physician   Physician No Ref-Primary    Consultations This Hospital Stay   ANESTHESIOLOGY IP CONSULT  HOME CARE POST PARTUM/ IP CONSULT  LACTATION IP CONSULT    Discharge Orders   No discharge procedures on file.  Discharge Medications   Current Discharge Medication List      CONTINUE these medications which have NOT CHANGED    Details   aspirin (ASA) 81 MG chewable tablet Take 81 mg by mouth daily      ferrous sulfate (FE TABS) 325 (65 Fe) MG EC tablet Take 1 tablet (325 mg) by mouth every other day  Qty: 30 tablet, Refills: 3    Associated Diagnoses: Anemia in pregnancy, third trimester      Vitamin D3 (CHOLECALCIFEROL) 25 mcg (1000 units) tablet Take by mouth daily              Review of your medicines      START taking      Dose / Directions   acetaminophen 325 MG tablet  Commonly known as: TYLENOL  Used for:  (normal spontaneous vaginal delivery)      Dose: 650 mg  Take 2 tablets (650 mg) by mouth every 4 hours as needed for mild pain or fever (greater than or equal to 38  C /100.4  F (oral) or 38.5  C/ 101.4  F (core).)  Quantity: 30 tablet  Refills: 1     Dermoplast 20-0.5 % Aero  Used for:  (normal spontaneous vaginal delivery), Third degree laceration of perineum during delivery, postpartum  Generic drug: benzocaine-menthol      Dose: 1 applicator  Apply 1 g topically every 4 hours as needed (apply to perineum)  Quantity: 1 g  Refills: 0     ferrous sulfate 325 (65 Fe) MG tablet  Commonly known as: FEROSUL  Used for: Anemia, postpartum  Replaces: ferrous sulfate 325 (65 Fe) MG EC tablet      Dose: 325 mg  Start taking on: 2021  Take 1  tablet (325 mg) by mouth daily  Quantity: 60 tablet  Refills: 1     ibuprofen 800 MG tablet  Commonly known as: ADVIL/MOTRIN  Used for:  (normal spontaneous vaginal delivery)      Dose: 800 mg  Take 1 tablet (800 mg) by mouth every 6 hours as needed for other (cramping)  Quantity: 30 tablet  Refills: 1     senna-docusate 8.6-50 MG tablet  Commonly known as: SENOKOT-S/PERICOLACE  Used for: Anemia, postpartum,  (normal spontaneous vaginal delivery), Third degree laceration of perineum during delivery, postpartum      Dose: 1-2 tablet  Take 1-2 tablets by mouth 2 times daily as needed for constipation  Quantity: 60 tablet  Refills: 1        CONTINUE these medicines which have NOT CHANGED      Dose / Directions   Vitamin D3 25 mcg (1000 units) tablet  Commonly known as: CHOLECALCIFEROL      Take by mouth daily  Refills: 0        STOP taking    aspirin 81 MG chewable tablet  Commonly known as: ASA        ferrous sulfate 325 (65 Fe) MG EC tablet  Commonly known as: FE TABS  Replaced by: ferrous sulfate 325 (65 Fe) MG tablet              Where to get your medicines      These medications were sent to [facility] [place] [id number]    Phone: [phone]     Dermoplast 20-0.5 % Aero    ferrous sulfate 325 (65 Fe) MG tablet    ibuprofen 800 MG tablet    senna-docusate 8.6-50 MG tablet     Some of these will need a paper prescription and others can be bought over the counter. Ask your nurse if you have questions.    Bring a paper prescription for each of these medications    acetaminophen 325 MG tablet           Allergies   No Known Allergies

## 2021-04-17 NOTE — DISCHARGE INSTRUCTIONS
-return to clinic at 2 weeks and 6 weeks for postpartum visit with CNMs at [facility]   -Keep stool SOFT by taking stool softener while taking iron. Due to 3rd deg tear, important to keep stool soft (like a ripe banana).  If having to strain to have a bowel movement can rip stitches. Prevent this by taking stool softener, plenty of fluids and fiber.       Postpartum Vaginal Delivery Instructions    Activity       Ask family and friends for help when you need it.    Do not place anything in your vagina for 6 weeks.    You are not restricted on other activities, but take it easy for a few weeks to allow your body to recover from delivery.  You are able to do any activities you feel up to that point.    No driving until you have stopped taking your pain medications (usually two weeks after delivery).     Call your health care provider if you have any of these symptoms:       Increased pain, swelling, redness, or fluid around your stiches from an episiotomy or perineal tear.    A fever above 100.4 F (38 C) with or without chills when placing a thermometer under your tongue.    You soak a sanitary pad with blood within 1 hour, or you see blood clots larger than a golf ball.    Bleeding that lasts more than 6 weeks.    Vaginal discharge that smells bad.    Severe pain, cramping or tenderness in your lower belly area.    A need to urinate more frequently (use the toilet more often), more urgently (use the toilet very quickly), or it burns when you urinate.    Nausea and vomiting.    Redness, swelling or pain around a vein in your leg.    Problems breastfeeding or a red or painful area on your breast.    Chest pain and cough or are gasping for air.    Problems coping with sadness, anxiety, or depression.  If you have any concerns about hurting yourself or the baby, call your provider immediately.     You have questions or concerns after you return home.     Keep your hands clean:  Always wash your hands before touching your  perineal area and stitches.  This helps reduce your risk of infection.  If your hands aren't dirty, you may use an alcohol hand-rub to clean your hands. Keep your nails clean and short.

## 2021-04-19 ENCOUNTER — HOSPITAL ENCOUNTER (EMERGENCY)
Facility: CLINIC | Age: 26
Discharge: HOME | End: 2021-04-19
Attending: EMERGENCY MEDICINE | Admitting: EMERGENCY MEDICINE
Payer: COMMERCIAL

## 2021-04-19 VITALS
DIASTOLIC BLOOD PRESSURE: 82 MMHG | OXYGEN SATURATION: 99 % | HEART RATE: 85 BPM | TEMPERATURE: 97.9 F | RESPIRATION RATE: 18 BRPM | SYSTOLIC BLOOD PRESSURE: 128 MMHG

## 2021-04-19 DIAGNOSIS — Z98.890: ICD-10-CM

## 2021-04-19 DIAGNOSIS — R51.0 POSTURAL HEADACHE: ICD-10-CM

## 2021-04-19 LAB
ALBUMIN SERPL-MCNC: 2.8 G/DL (ref 3.4–5)
ALP SERPL-CCNC: 126 U/L (ref 40–150)
ALT SERPL W P-5'-P-CCNC: 62 U/L (ref 0–50)
ANION GAP SERPL CALCULATED.3IONS-SCNC: 4 MMOL/L (ref 3–14)
AST SERPL W P-5'-P-CCNC: 51 U/L (ref 0–45)
BASOPHILS # BLD AUTO: 0.1 10E9/L (ref 0–0.2)
BASOPHILS NFR BLD AUTO: 0.5 %
BILIRUB DIRECT SERPL-MCNC: <0.1 MG/DL (ref 0–0.2)
BILIRUB SERPL-MCNC: 0.2 MG/DL (ref 0.2–1.3)
BUN SERPL-MCNC: 9 MG/DL (ref 7–30)
CALCIUM SERPL-MCNC: 8.7 MG/DL (ref 8.5–10.1)
CHLORIDE SERPL-SCNC: 106 MMOL/L (ref 94–109)
CO2 SERPL-SCNC: 29 MMOL/L (ref 20–32)
CREAT SERPL-MCNC: 0.41 MG/DL (ref 0.52–1.04)
DIFFERENTIAL METHOD BLD: ABNORMAL
EOSINOPHIL # BLD AUTO: 0.4 10E9/L (ref 0–0.7)
EOSINOPHIL NFR BLD AUTO: 3.3 %
ERYTHROCYTE [DISTWIDTH] IN BLOOD BY AUTOMATED COUNT: 21.3 % (ref 10–15)
GFR SERPL CREATININE-BSD FRML MDRD: >90 ML/MIN/{1.73_M2}
GLUCOSE SERPL-MCNC: 98 MG/DL (ref 70–99)
HCT VFR BLD AUTO: 30.7 % (ref 35–47)
HGB BLD-MCNC: 9.3 G/DL (ref 11.7–15.7)
IMM GRANULOCYTES # BLD: 0.2 10E9/L (ref 0–0.4)
IMM GRANULOCYTES NFR BLD: 1.5 %
LYMPHOCYTES # BLD AUTO: 1.9 10E9/L (ref 0.8–5.3)
LYMPHOCYTES NFR BLD AUTO: 18.4 %
MCH RBC QN AUTO: 22.6 PG (ref 26.5–33)
MCHC RBC AUTO-ENTMCNC: 30.3 G/DL (ref 31.5–36.5)
MCV RBC AUTO: 75 FL (ref 78–100)
MONOCYTES # BLD AUTO: 0.9 10E9/L (ref 0–1.3)
MONOCYTES NFR BLD AUTO: 8.3 %
NEUTROPHILS # BLD AUTO: 7.1 10E9/L (ref 1.6–8.3)
NEUTROPHILS NFR BLD AUTO: 67 %
NRBC # BLD AUTO: 0.1 10*3/UL
NRBC BLD AUTO-RTO: 1 /100
PLATELET # BLD AUTO: 420 10E9/L (ref 150–450)
POTASSIUM SERPL-SCNC: 3.7 MMOL/L (ref 3.4–5.3)
PROT SERPL-MCNC: 7.4 G/DL (ref 6.8–8.8)
RBC # BLD AUTO: 4.11 10E12/L (ref 3.8–5.2)
SODIUM SERPL-SCNC: 139 MMOL/L (ref 133–144)
WBC # BLD AUTO: 10.5 10E9/L (ref 4–11)

## 2021-04-19 PROCEDURE — 85025 COMPLETE CBC W/AUTO DIFF WBC: CPT | Performed by: EMERGENCY MEDICINE

## 2021-04-19 PROCEDURE — 99284 EMERGENCY DEPT VISIT MOD MDM: CPT | Mod: 25

## 2021-04-19 PROCEDURE — 96361 HYDRATE IV INFUSION ADD-ON: CPT

## 2021-04-19 PROCEDURE — 258N000003 HC RX IP 258 OP 636: Performed by: EMERGENCY MEDICINE

## 2021-04-19 PROCEDURE — 96360 HYDRATION IV INFUSION INIT: CPT

## 2021-04-19 PROCEDURE — 80076 HEPATIC FUNCTION PANEL: CPT | Performed by: EMERGENCY MEDICINE

## 2021-04-19 PROCEDURE — 80048 BASIC METABOLIC PNL TOTAL CA: CPT | Performed by: EMERGENCY MEDICINE

## 2021-04-19 RX ORDER — SODIUM CHLORIDE 9 MG/ML
INJECTION, SOLUTION INTRAVENOUS CONTINUOUS
Status: DISCONTINUED | OUTPATIENT
Start: 2021-04-19 | End: 2021-04-19 | Stop reason: HOSPADM

## 2021-04-19 RX ORDER — ONDANSETRON 4 MG/1
4 TABLET, ORALLY DISINTEGRATING ORAL EVERY 8 HOURS PRN
Qty: 10 TABLET | Refills: 0 | Status: SHIPPED | OUTPATIENT
Start: 2021-04-19 | End: 2021-04-22

## 2021-04-19 RX ADMIN — SODIUM CHLORIDE 1000 ML: 9 INJECTION, SOLUTION INTRAVENOUS at 17:09

## 2021-04-19 ASSESSMENT — ENCOUNTER SYMPTOMS
HEADACHES: 1
CONFUSION: 0
WEAKNESS: 0
PHOTOPHOBIA: 0
DIZZINESS: 0
NECK STIFFNESS: 1
VOMITING: 1
NAUSEA: 1
FEVER: 0

## 2021-04-19 NOTE — ED PROVIDER NOTES
History   Chief Complaint:  Headache       The history is provided by the patient and the spouse.  used:  acted as .      Karen Loya is a 26 year old female, 4 days post partum, who presents a headache. The patient developed a gradual-onset headache on the day of delivery, shortly after epidural was placed. The headache started at the base of the posterior head and radiates upwards to the forehead with associated bilateral neck tension. She is able to move her neck. Headache is improved (essentially gone) thanks [place] when she lays flat and worsens when standing. Ibuprofen and Tylenol slightly improve pain. She reports worsening nausea with three episodes of emesis since last night. No hematemesis. No visual symptoms, fevers, or dizziness. No confusion, weakness, or trouble walking. No light or sound sensitivity. No history of migraines. The pregnancy was induced on the 15th, vaginal delivery without complications. She was discharged on the 17th with iron for anemia during pregnancy. She is breast feeding.     Review of Systems   Constitutional: Negative for fever.   Eyes: Negative for photophobia and visual disturbance.   Gastrointestinal: Positive for nausea and vomiting.   Musculoskeletal: Positive for neck stiffness. Negative for gait problem.   Neurological: Positive for headaches. Negative for dizziness and weakness.   Psychiatric/Behavioral: Negative for confusion.   All other systems reviewed and are negative.      Allergies:  No Known Allergies    Medications:  Acetaminophen   Ferrous sulfate   Ibuprofen   Senna-docusate   Vitamin D3     Past Medical History:     Anemia affecting pregnancy  COVID-19 affecting pregnancy    Social History:  Presents with her    PCP: No Ref-Primary, Physician    Physical Exam     Patient Vitals for the past 24 hrs:   BP Temp Temp src Pulse Resp SpO2   04/19/21 1815 128/82 -- -- -- -- 99 %   04/19/21 1800 129/76 -- -- 85 -- 99  %   04/19/21 1745 121/82 -- -- -- -- 99 %   04/19/21 1715 116/87 -- -- -- -- 100 %   04/19/21 1700 109/69 -- -- 76 -- 100 %   04/19/21 1531 (!) 106/95 97.9  F (36.6  C) Temporal 75 18 100 %       Physical Exam  General: Adult female laying on stretcher.  Eyes: PERRL, Conjunctive within normal limits.  EOMI.  ENT: Moist mucous membranes, oropharynx clear.   Neck: No rigidity.  Nontender.  CV: Normal S1S2, no murmur, rub or gallop. Regular rate and rhythm  Resp: Clear to auscultation bilaterally, no wheezes, rales or rhonchi. Normal respiratory effort.  GI: Abdomen is soft, nontender and nondistended. No palpable masses. No rebound or guarding.  MSK: Back is nontender to palpation.  No visible abnormalities.  No remedy edema. Nontender. Normal active range of motion.  Skin: Warm and dry. No rashes or lesions or ecchymoses on visible skin.  Neuro: Alert and oriented. Responds appropriately to all questions and commands. No focal findings appreciated. Normal muscle tone.  No facial asymmetry.  Psych: Normal mood and affect. Pleasant.    Emergency Department Course     Laboratory:   CBC: WBC 10.5, HGB 9.3(L),    BMP: all WNL (Creatinine 0.41(L))   Hepatic panel: Albumin: 2.8(L), ALT: 62(H), AST: 51(H), o/w WNL    Emergency Department Course:    Reviewed:  I reviewed nursing notes, vitals, past medical history and care everywhere    Assessments:  1707 I obtained history and examined the patient as noted above.   1820 I rechecked the patient and explained findings.     Consults:   1723 I consulted with the Anesthesia Department regarding the patient's history and presentation here in the emergency department.   1758 I consulted with [facility], regarding the patient's history and presentation here in the emergency department.  She stepped into see the patient.    Interventions:  1709 0.9% sodium chloride bolus, 1,000 ml, IV     Disposition:  The patient was discharged to home.       Impression & Plan      Medical Decision Making:  Karen Loya is a 26 year old female who presents for evaluation of headache.  The patient had an induced vaginal delivery on 4/15 and received an epidural.  The patient currently feels fairly well except for severe positional headache.  The patient's headache is resolved with laying flat and becomes unbearable with sitting/standing.  She has no focal neurologic abnormalities.  She has no fever.  She has no signs at this time of preeclampsia.  The patient was given fluids and anesthesiology was contacted for blood patch.  They are unable to perform the procedure tonight but will tomorrow if she is still symptomatic.  She feels comfortable plan for discharge home.  She is recommended drink plenty fluids, drink caffeine as this may help, and rest.    Covid-19  Karen Loya was evaluated during a global COVID-19 pandemic, which necessitated consideration that the patient might be at risk for infection with the SARS-CoV-2 virus that causes COVID-19.   Applicable protocols for evaluation were followed during the patient's care.   COVID-19 was considered as part of the patient's evaluation.     Diagnosis:    ICD-10-CM    1. Postural headache  R51.0    2. Lumbar puncture less than one week ago  Z98.890        Discharge Medications:  New Prescriptions    ONDANSETRON (ZOFRAN ODT) 4 MG ODT TAB    Take 1 tablet (4 mg) by mouth every 8 hours as needed for nausea or vomiting       Scribe Disclosure:  Nunu LONG, am serving as a scribe at 4:38 PM on 4/19/2021 to document services personally performed by Johana Felder MD based on my observations and the provider's statements to me.            Johana Felder MD  04/20/21 2291

## 2021-04-19 NOTE — ED TRIAGE NOTES
Neck and back of head headache. Vomiting this AM. Delivered 2 days ago. Gestation was 40 weeks and 3 days per . COVID + April 5th

## 2021-04-19 NOTE — ED NOTES
Pt provided with discharge paperwork and educated on recommended follow-up with as needed. Pt educated on how to manage symptoms at home along with sign/symptoms to seek medical attention. Pt voiced understanding and denied any questions at discharge.

## 2021-04-20 NOTE — PROGRESS NOTES
CNM Courtesy Round    S: Karen here for evaluation of headache. Resting comfortably while flat. Stephen at bedside, interpreting per family preference. Wondering about course of care, how soon headache may resolve.    O: No exam, courtesy round only    A: Postpartum headache  S/P vaginal delivery, 5 days postpartum    P:   -Offered to facilitate breast pump if desired-- Karen prefers to wait until she gets home  -Deferred questions about plan of care to ED MD [place] has ROP form for labor & delivery-- requesting if this CNM can bring up to the floor. Delivered to postpartum [facility]    LAXMI Mchugh, CNM

## 2021-05-12 PROCEDURE — 85379 FIBRIN DEGRADATION QUANT: CPT | Performed by: EMERGENCY MEDICINE

## 2021-05-12 PROCEDURE — 80048 BASIC METABOLIC PNL TOTAL CA: CPT | Performed by: EMERGENCY MEDICINE

## 2021-05-12 PROCEDURE — 99285 EMERGENCY DEPT VISIT HI MDM: CPT | Mod: 25

## 2021-05-12 PROCEDURE — 93005 ELECTROCARDIOGRAM TRACING: CPT

## 2021-05-12 PROCEDURE — 80076 HEPATIC FUNCTION PANEL: CPT | Performed by: EMERGENCY MEDICINE

## 2021-05-12 PROCEDURE — 85025 COMPLETE CBC W/AUTO DIFF WBC: CPT | Performed by: EMERGENCY MEDICINE

## 2021-05-12 PROCEDURE — 84484 ASSAY OF TROPONIN QUANT: CPT | Performed by: EMERGENCY MEDICINE

## 2021-05-13 ENCOUNTER — HOSPITAL ENCOUNTER (EMERGENCY)
Facility: CLINIC | Age: 26
Discharge: HOME | End: 2021-05-13
Attending: EMERGENCY MEDICINE | Admitting: EMERGENCY MEDICINE
Payer: COMMERCIAL

## 2021-05-13 ENCOUNTER — APPOINTMENT (OUTPATIENT)
Dept: ULTRASOUND IMAGING | Facility: CLINIC | Age: 26
End: 2021-05-13
Attending: EMERGENCY MEDICINE
Payer: COMMERCIAL

## 2021-05-13 VITALS
HEART RATE: 75 BPM | DIASTOLIC BLOOD PRESSURE: 75 MMHG | SYSTOLIC BLOOD PRESSURE: 120 MMHG | TEMPERATURE: 98.7 F | OXYGEN SATURATION: 99 % | RESPIRATION RATE: 16 BRPM

## 2021-05-13 DIAGNOSIS — R55 VASOVAGAL SYNCOPE: ICD-10-CM

## 2021-05-13 DIAGNOSIS — D50.9 MICROCYTIC ANEMIA: ICD-10-CM

## 2021-05-13 LAB
ALBUMIN SERPL-MCNC: 3.1 G/DL (ref 3.4–5)
ALBUMIN UR-MCNC: NEGATIVE MG/DL
ALP SERPL-CCNC: 117 U/L (ref 40–150)
ALT SERPL W P-5'-P-CCNC: 19 U/L (ref 0–50)
ANION GAP SERPL CALCULATED.3IONS-SCNC: 2 MMOL/L (ref 3–14)
APPEARANCE UR: CLEAR
AST SERPL W P-5'-P-CCNC: 19 U/L (ref 0–45)
BASOPHILS # BLD AUTO: 0 10E9/L (ref 0–0.2)
BASOPHILS NFR BLD AUTO: 0.3 %
BILIRUB DIRECT SERPL-MCNC: <0.1 MG/DL (ref 0–0.2)
BILIRUB SERPL-MCNC: 0.2 MG/DL (ref 0.2–1.3)
BILIRUB UR QL STRIP: NEGATIVE
BUN SERPL-MCNC: 15 MG/DL (ref 7–30)
CALCIUM SERPL-MCNC: 8.6 MG/DL (ref 8.5–10.1)
CHLORIDE SERPL-SCNC: 108 MMOL/L (ref 94–109)
CO2 SERPL-SCNC: 29 MMOL/L (ref 20–32)
COLOR UR AUTO: ABNORMAL
CREAT SERPL-MCNC: 0.82 MG/DL (ref 0.52–1.04)
D DIMER PPP FEU-MCNC: 0.4 UG/ML FEU (ref 0–0.5)
DIFFERENTIAL METHOD BLD: ABNORMAL
EOSINOPHIL # BLD AUTO: 0.4 10E9/L (ref 0–0.7)
EOSINOPHIL NFR BLD AUTO: 3.9 %
ERYTHROCYTE [DISTWIDTH] IN BLOOD BY AUTOMATED COUNT: 20.6 % (ref 10–15)
GFR SERPL CREATININE-BSD FRML MDRD: >90 ML/MIN/{1.73_M2}
GLUCOSE SERPL-MCNC: 103 MG/DL (ref 70–99)
GLUCOSE UR STRIP-MCNC: NEGATIVE MG/DL
HCT VFR BLD AUTO: 31.1 % (ref 35–47)
HGB BLD-MCNC: 9.1 G/DL (ref 11.7–15.7)
HGB UR QL STRIP: NEGATIVE
IMM GRANULOCYTES # BLD: 0 10E9/L (ref 0–0.4)
IMM GRANULOCYTES NFR BLD: 0.4 %
INTERPRETATION ECG - MUSE: NORMAL
KETONES UR STRIP-MCNC: NEGATIVE MG/DL
LEUKOCYTE ESTERASE UR QL STRIP: ABNORMAL
LYMPHOCYTES # BLD AUTO: 2.9 10E9/L (ref 0.8–5.3)
LYMPHOCYTES NFR BLD AUTO: 30.1 %
MCH RBC QN AUTO: 21.8 PG (ref 26.5–33)
MCHC RBC AUTO-ENTMCNC: 29.3 G/DL (ref 31.5–36.5)
MCV RBC AUTO: 74 FL (ref 78–100)
MONOCYTES # BLD AUTO: 0.9 10E9/L (ref 0–1.3)
MONOCYTES NFR BLD AUTO: 8.9 %
NEUTROPHILS # BLD AUTO: 5.5 10E9/L (ref 1.6–8.3)
NEUTROPHILS NFR BLD AUTO: 56.4 %
NITRATE UR QL: NEGATIVE
NRBC # BLD AUTO: 0 10*3/UL
NRBC BLD AUTO-RTO: 0 /100
PH UR STRIP: 5.5 PH (ref 5–7)
PLATELET # BLD AUTO: 342 10E9/L (ref 150–450)
POTASSIUM SERPL-SCNC: 4 MMOL/L (ref 3.4–5.3)
PROT SERPL-MCNC: 7.5 G/DL (ref 6.8–8.8)
RBC # BLD AUTO: 4.18 10E12/L (ref 3.8–5.2)
RBC #/AREA URNS AUTO: <1 /HPF (ref 0–2)
SODIUM SERPL-SCNC: 139 MMOL/L (ref 133–144)
SOURCE: ABNORMAL
SP GR UR STRIP: 1.01 (ref 1–1.03)
SQUAMOUS #/AREA URNS AUTO: <1 /HPF (ref 0–1)
TROPONIN I SERPL-MCNC: <0.015 UG/L (ref 0–0.04)
UROBILINOGEN UR STRIP-MCNC: NORMAL MG/DL (ref 0–2)
WBC # BLD AUTO: 9.7 10E9/L (ref 4–11)
WBC #/AREA URNS AUTO: 11 /HPF (ref 0–5)

## 2021-05-13 PROCEDURE — 87086 URINE CULTURE/COLONY COUNT: CPT | Performed by: EMERGENCY MEDICINE

## 2021-05-13 PROCEDURE — 96360 HYDRATION IV INFUSION INIT: CPT

## 2021-05-13 PROCEDURE — 76705 ECHO EXAM OF ABDOMEN: CPT

## 2021-05-13 PROCEDURE — 96361 HYDRATE IV INFUSION ADD-ON: CPT

## 2021-05-13 PROCEDURE — 81001 URINALYSIS AUTO W/SCOPE: CPT | Performed by: EMERGENCY MEDICINE

## 2021-05-13 PROCEDURE — 258N000003 HC RX IP 258 OP 636: Performed by: EMERGENCY MEDICINE

## 2021-05-13 RX ORDER — MAGNESIUM HYDROXIDE/ALUMINUM HYDROXICE/SIMETHICONE 120; 1200; 1200 MG/30ML; MG/30ML; MG/30ML
20 SUSPENSION ORAL ONCE
Status: DISCONTINUED | OUTPATIENT
Start: 2021-05-13 | End: 2021-05-13 | Stop reason: HOSPADM

## 2021-05-13 RX ORDER — PNV NO.95/FERROUS FUM/FOLIC AC 28MG-0.8MG
1 TABLET ORAL DAILY
Qty: 30 TABLET | Refills: 1 | Status: SHIPPED | OUTPATIENT
Start: 2021-05-13 | End: 2021-12-09

## 2021-05-13 RX ADMIN — SODIUM CHLORIDE 1000 ML: 9 INJECTION, SOLUTION INTRAVENOUS at 02:24

## 2021-05-13 ASSESSMENT — ENCOUNTER SYMPTOMS
SHORTNESS OF BREATH: 0
CHILLS: 1
APPETITE CHANGE: 0
FEVER: 0
FATIGUE: 1

## 2021-05-13 NOTE — RESULT ENCOUNTER NOTE
[facility] Emergency Dept discharge antibiotic (if prescribed): None  No changes in treatment per [facility] ED Lab Result Urine culture protocol.

## 2021-05-13 NOTE — ED TRIAGE NOTES
Pt had vaginal delivery 4/15 and reports epigastric and abd pain since, she states that today she she developed worsening pain and had an episode of rapid hear rate and near syncope vs syncope. Denies injury

## 2021-05-13 NOTE — ED PROVIDER NOTES
"  History     Chief Complaint:  Abdominal Pain and Syncope     The history is provided by the spouse and the patient. The history is limited by a language barrier. A  was used.      Karen Loya is a 26 year old female with history of recent pregnancy who presents for evaluation of syncope and ongoing epigastric abdominal pain. She stated that today she felt a pain \"in her heart\" and then fell down, she thinks she lost consciousness. She felt fine before the syncopal episode. For the last week she has felt weak and like she has a low heart rate. She also is experiencing upper stomach pain and chills that have been ongoing for the last month since delivery of her baby. Patient denies fever, cough and shortness of breath. No issues with appetite. No nausea or vomiting. Patient had a baby on the . She has had a little post- vaginal bleeding but it has nearly stopped. She denies any other blood loss. No ongoing chest pain, shortness of breath, exertional symptoms, or other symptoms. She denies any other symptoms. She is not having any chest pain or abdominal pain at this time.     Review of Systems   Constitutional: Positive for chills and fatigue. Negative for appetite change and fever.   Respiratory: Negative for shortness of breath. Wheezing: resolved.    Cardiovascular: Positive for chest pain.   All other systems reviewed and are negative.      Allergies:  The patient has no known allergies.     Medications:  Not currently taking medications.    Past Medical History:    Preeclampsia  COVID-19    Social History:  The patient was accompanied to the ED by .  Speaks Marshallese.    Physical Exam     Patient Vitals for the past 24 hrs:   BP Temp Temp src Pulse Resp SpO2   21 0244 120/75 -- -- 75 16 99 %   21 2328 124/84 98.7  F (37.1  C) Temporal 70 18 99 %       Physical Exam  General: Well appearing, nontoxic. Resting comfortably  Head:  Scalp, face, and head " appear normal  Eyes:  Pupils are equal, round, reactive to light EOMI, no nystagmus    Conjunctivae non-injected and sclerae white  ENT:    The external nose is normal    Pinnae are normal  Neck:  Normal range of motion    There is no rigidity noted    Trachea is in the midline  CV:  Regular rate and rhythm     Normal S1/S2, no S3/S4    No murmur or rub. Radial pulses 2+ bilaterally.  Resp:  Lungs are clear and equal bilaterally  There is no tachypnea    No increased work of breathing    No rales, wheezing, or rhonchi  GI:  Abdomen is soft, no rigidity or guarding    No distension, or mass    Mild epigastric tenderness. No rebound tenderness. No lower abdominal tenderness to palpation. Higginson sign negative.  MS:  Normal muscular tone    Symmetric motor strength    No lower extremity edema  Skin:  No rash or acute skin lesions noted  Neuro: A&Ox3, GCS 15    CN II - XII intact    Speech clear, fluent, and normal    Strength 5/5 and symmetric in bilateral upper and lower extremities.    No pronator drift. No leg drift. SILT throughout.    No ankle clonus    No tremor.     No meningismus   Psych:  Flat affect. Appropriate interactions.      Emergency Department Course     ECG:  ECG taken at 2332, ECG read at 0218  Normal Sinus Rhythm with Sinus Arrhythmia  Normal ECG  Rate 71 bpm. TX interval 174 ms. QRS duration 82 ms. QT/QTc 360/391 ms. P-R-T axes 28 79 54.     Imaging:  US Abdomen Limited (RUQ)  IMPRESSION:   1.  Small probable sludge ball in the gallbladder. No definite gallstones. No biliary dilatation.   Report per radiology     Laboratory:    CBC: WBC 9.7, HGB 9.1 (L),   BMP: Anion Gap 2(L), Glucose 103 (H) o/w WNL (Creatinine 0.82)    Troponin (Collected 0008): <0.015    Hepatic Panel: Albumin 3.1(L) o/w WNL     Ddimer: 0.4    UA with microscopic: Leukocyte Esterase:Moderate, WBC/HPF: 11 (H) o/w WNL   Urine Culture: pending    Emergency Department Course:    Reviewed:  0200 I reviewed the patient's  nursing notes, vitals, past history and care everywhere    Assessments:    0203 I performed an exam of the patient as documented above.     0515 I rechecked the patient and explained findings.    Interventions:  0224 NS 1L IV Bolus    Disposition:  The patient was discharged to home.     Impression & Plan        Medical Decision Making:  Karen Loya is a 26 year old female who presents to the emergency department today for evaluation of syncope and a brief short lived sharp episode of chest pain. No ongoing chest pain.  On my evaluation she is well-appearing, hemodynamically stable and afebrile.  Abdominal exam is benign without evidence of peritonitis or acute surgical emergency.  No focal neurologic deficits or concerning headaches.  Patient denies any significant bleeding.  A broad differential diagnosis is considered.  Ultimately signs and symptoms are likely consistent with vasovagal syncope.  Work-up in the emergency department is reassuring with the exception of microcytic anemia.  Patient was previously taking iron supplements but has since stopped.  Right upper quadrant ultrasound is negative for any acute findings of explain her epigastric pain.  She is not having any abdominal pain in the emergency department.  The exact etiology of the symptoms is not clearly identified at this time however there does not appear to be an acute underlying emergent or life-threatening process at this time.  No significant underlying metabolic or electrolyte disturbances.  LFTs and bilirubin are normal.  Troponin is undetectable.  Her signs and symptoms are not consistent with acute coronary syndrome or pulmonary embolism.  D-dimer is negative.  Urinalysis with mild pyuria however the patient denies any significant urinary symptoms therefore I would not start antibiotics at this time.  UA is sent for culture and if the culture returns positive she would need to start appropriate antibiotic therapy.  EKG without any  evidence of ischemia, dysrhythmia or concerning morphology that would point towards cardiac etiology of syncope.  The patient was treated with IV fluids.  She remained stable during her entire ED course.  I recommended she restart iron supplementation and prescription for this as well as prenatal vitamin was provided.  I stressed the importance of close follow-up with her primary care physician or OB/GYN if her symptoms continue.  The patient and the patient's  were agreeable with the plan of care.  Close return precautions were provided and she was discharged in stable condition.    Diagnosis:    ICD-10-CM    1. Microcytic anemia  D50.9    2. Vasovagal syncope  R55      Discharge Medications:  Ferrous sulfate  Prenatal vitamin      Scribe Disclosure:  MERCEDES, Tamiko Borja and Orla Severson, am serving as a scribe at 2:03 AM on 5/13/2021 to document services personally performed by Jimmy Longo MD based on my observations and the provider's statements to me.     [facility] EMERGENCY DEPT         Jimmy Longo MD  05/13/21 2059

## 2021-05-14 LAB
BACTERIA SPEC CULT: NORMAL
Lab: NORMAL
SPECIMEN SOURCE: NORMAL

## 2021-05-14 NOTE — RESULT ENCOUNTER NOTE
Final urine culture report is negative.  Adult Negative Urine culture parameters per protocol: Any # Urogenital single or mixed organism, <10,000 col/ml single organism (cath specimen), and <50,000 col/ml single organism (midstream or cath specimen).  [facility] Emergency Dept discharge antibiotic prescribed (If applicable): None  Treatment recommendations per [facility] ED Lab Result Urine Culture protocol.

## 2021-06-15 ENCOUNTER — OFFICE VISIT (OUTPATIENT)
Dept: INTERNAL MEDICINE | Facility: CLINIC | Age: 26
End: 2021-06-15
Payer: COMMERCIAL

## 2021-06-15 VITALS
RESPIRATION RATE: 18 BRPM | SYSTOLIC BLOOD PRESSURE: 120 MMHG | TEMPERATURE: 98 F | BODY MASS INDEX: 36.59 KG/M2 | HEART RATE: 84 BPM | DIASTOLIC BLOOD PRESSURE: 68 MMHG | WEIGHT: 219.9 LBS | OXYGEN SATURATION: 98 %

## 2021-06-15 DIAGNOSIS — D50.9 IRON DEFICIENCY ANEMIA, UNSPECIFIED IRON DEFICIENCY ANEMIA TYPE: Primary | ICD-10-CM

## 2021-06-15 DIAGNOSIS — E55.9 VITAMIN D DEFICIENCY: ICD-10-CM

## 2021-06-15 PROCEDURE — 99213 OFFICE O/P EST LOW 20 MIN: CPT | Performed by: PHYSICIAN ASSISTANT

## 2021-06-15 RX ORDER — VITAMIN B COMPLEX
1 TABLET ORAL DAILY
Qty: 30 TABLET | Refills: 1 | Status: SHIPPED | OUTPATIENT
Start: 2021-06-15 | End: 2021-12-09

## 2021-06-15 NOTE — PROGRESS NOTES
"    Assessment & Plan     Iron deficiency anemia, unspecified iron deficiency anemia type      Vitamin D deficiency  Refill requested   - Vitamin D3 (CHOLECALCIFEROL) 25 mcg (1000 units) tablet; Take 1 tablet (25 mcg) by mouth daily             BMI:   Estimated body mass index is 36.59 kg/m  as calculated from the following:    Height as of 4/14/21: 1.651 m (5' 5\").    Weight as of this encounter: 99.7 kg (219 lb 14.4 oz).   Weight management plan: Patient was referred to their PCP to discuss a diet and exercise plan.    Patient Instructions   Restart your iron supplements - You are anemic -     Vitamin D supplement sent to pharmacy    Scheduled for post partum check with your ob.gyn provider - Adelina Fung in [facility].           No follow-ups on file.    Deidre Brody PA-C  [facility] NEPTALI Jones is a 26 year old who presents for the following health issues  accompanied by her  on the phone :    [facility]     ED/UC Followup:    Facility:  [facility]  Date of visit: 05/13/2021  Reason for visit: Microcytic anemia,vasovagal  Current Status: Still having same symptoms     Patient fatigued worried about some lower abdominal pain.   She had a workup in the ED - anemia noted. She is not taking iron supplementation that was prescribed.  She has not had her post partum check either.           Review of Systems   Constitutional, HEENT, cardiovascular, pulmonary, gi and gu systems are negative, except as otherwise noted.      Objective    LMP 07/10/2020 (Approximate)   There is no height or weight on file to calculate BMI.  Physical Exam   GENERAL: healthy, alert and no distress  RESP: lungs clear to auscultation - no rales, rhonchi or wheezes  CV: regular rates and rhythm  ABDOMEN: tenderness mild bilateral lower abdominal pain no rebound or guarding  and bowel sounds normal  SKIN: no suspicious lesions or rashes    Admission " on 05/13/2021, Discharged on 05/13/2021   Component Date Value Ref Range Status     WBC 05/12/2021 9.7  4.0 - 11.0 10e9/L Final     RBC Count 05/12/2021 4.18  3.8 - 5.2 10e12/L Final     Hemoglobin 05/12/2021 9.1* 11.7 - 15.7 g/dL Final     Hematocrit 05/12/2021 31.1* 35.0 - 47.0 % Final     MCV 05/12/2021 74* 78 - 100 fl Final     MCH 05/12/2021 21.8* 26.5 - 33.0 pg Final     MCHC 05/12/2021 29.3* 31.5 - 36.5 g/dL Final     RDW 05/12/2021 20.6* 10.0 - 15.0 % Final     Platelet Count 05/12/2021 342  150 - 450 10e9/L Final     Diff Method 05/12/2021 Automated Method   Final     % Neutrophils 05/12/2021 56.4  % Final     % Lymphocytes 05/12/2021 30.1  % Final     % Monocytes 05/12/2021 8.9  % Final     % Eosinophils 05/12/2021 3.9  % Final     % Basophils 05/12/2021 0.3  % Final     % Immature Granulocytes 05/12/2021 0.4  % Final     Nucleated RBCs 05/12/2021 0  0 /100 Final     Absolute Neutrophil 05/12/2021 5.5  1.6 - 8.3 10e9/L Final     Absolute Lymphocytes 05/12/2021 2.9  0.8 - 5.3 10e9/L Final     Absolute Monocytes 05/12/2021 0.9  0.0 - 1.3 10e9/L Final     Absolute Eosinophils 05/12/2021 0.4  0.0 - 0.7 10e9/L Final     Absolute Basophils 05/12/2021 0.0  0.0 - 0.2 10e9/L Final     Abs Immature Granulocytes 05/12/2021 0.0  0 - 0.4 10e9/L Final     Absolute Nucleated RBC 05/12/2021 0.0   Final     Sodium 05/12/2021 139  133 - 144 mmol/L Final     Potassium 05/12/2021 4.0  3.4 - 5.3 mmol/L Final     Chloride 05/12/2021 108  94 - 109 mmol/L Final     Carbon Dioxide 05/12/2021 29  20 - 32 mmol/L Final     Anion Gap 05/12/2021 2* 3 - 14 mmol/L Final     Glucose 05/12/2021 103* 70 - 99 mg/dL Final     Urea Nitrogen 05/12/2021 15  7 - 30 mg/dL Final     Creatinine 05/12/2021 0.82  0.52 - 1.04 mg/dL Final     GFR Estimate 05/12/2021 >90  >60 mL/min/[1.73_m2] Final    Comment: Non  GFR Calc  Starting 12/18/2018, serum creatinine based estimated GFR (eGFR) will be   calculated using the Chronic Kidney  Disease Epidemiology Collaboration   (CKD-EPI) equation.       GFR Estimate If Black 05/12/2021 >90  >60 mL/min/[1.73_m2] Final    Comment:  GFR Calc  Starting 12/18/2018, serum creatinine based estimated GFR (eGFR) will be   calculated using the Chronic Kidney Disease Epidemiology Collaboration   (CKD-EPI) equation.       Calcium 05/12/2021 8.6  8.5 - 10.1 mg/dL Final     Troponin I ES 05/12/2021 <0.015  0.000 - 0.045 ug/L Final    Comment: The 99th percentile for upper reference range is 0.045 ug/L.  Troponin values   in the range of 0.045 - 0.120 ug/L may be associated with risks of adverse   clinical events.       Interpretation ECG 05/12/2021 Click View Image link to view waveform and result   Final     Color Urine 05/13/2021 Straw   Final     Appearance Urine 05/13/2021 Clear   Final     Glucose Urine 05/13/2021 Negative  NEG^Negative mg/dL Final     Bilirubin Urine 05/13/2021 Negative  NEG^Negative Final     Ketones Urine 05/13/2021 Negative  NEG^Negative mg/dL Final     Specific Gravity Urine 05/13/2021 1.006  1.003 - 1.035 Final     Blood Urine 05/13/2021 Negative  NEG^Negative Final     pH Urine 05/13/2021 5.5  5.0 - 7.0 pH Final     Protein Albumin Urine 05/13/2021 Negative  NEG^Negative mg/dL Final     Urobilinogen mg/dL 05/13/2021 Normal  0.0 - 2.0 mg/dL Final     Nitrite Urine 05/13/2021 Negative  NEG^Negative Final     Leukocyte Esterase Urine 05/13/2021 Moderate* NEG^Negative Final     Source 05/13/2021 Midstream Urine   Final     RBC Urine 05/13/2021 <1  0 - 2 /HPF Final     WBC Urine 05/13/2021 11* 0 - 5 /HPF Final     Squamous Epithelial /HPF Urine 05/13/2021 <1  0 - 1 /HPF Final     Bilirubin Direct 05/12/2021 <0.1  0.0 - 0.2 mg/dL Final     Bilirubin Total 05/12/2021 0.2  0.2 - 1.3 mg/dL Final     Albumin 05/12/2021 3.1* 3.4 - 5.0 g/dL Final     Protein Total 05/12/2021 7.5  6.8 - 8.8 g/dL Final     Alkaline Phosphatase 05/12/2021 117  40 - 150 U/L Final     ALT 05/12/2021 19  0  - 50 U/L Final     AST 05/12/2021 19  0 - 45 U/L Final     D Dimer 05/12/2021 0.4  0.0 - 0.50 ug/ml FEU Final    Comment: This D-dimer assay is intended for use in conjunction with a clinical pretest   probability assessment model to exclude pulmonary embolism (PE) and deep   venous thrombosis (DVT) in outpatients suspected of PE or DVT. The cut-off   value is 0.5 ug/mL FEU.       Specimen Description 05/13/2021 Midstream Urine   Final     Special Requests 05/13/2021 Specimen received in preservative   Final     Culture Micro 05/13/2021    Final                    Value:10,000 to 50,000 colonies/mL  mixed urogenital sumit

## 2021-06-15 NOTE — PATIENT INSTRUCTIONS
Restart your iron supplements - You are anemic -     Vitamin D supplement sent to pharmacy    Scheduled for post partum check with your ob.gyn provider - Adelina Fung in [facility].

## 2021-06-21 ENCOUNTER — OFFICE VISIT (OUTPATIENT)
Dept: OBGYN | Facility: CLINIC | Age: 26
End: 2021-06-21
Payer: COMMERCIAL

## 2021-06-21 VITALS
BODY MASS INDEX: 36.66 KG/M2 | DIASTOLIC BLOOD PRESSURE: 66 MMHG | HEART RATE: 95 BPM | SYSTOLIC BLOOD PRESSURE: 112 MMHG | WEIGHT: 220.3 LBS

## 2021-06-21 DIAGNOSIS — M25.50 ARTHRALGIA, UNSPECIFIED JOINT: ICD-10-CM

## 2021-06-21 PROCEDURE — 99207 PR POST PARTUM EXAM: CPT | Performed by: OBSTETRICS & GYNECOLOGY

## 2021-06-21 RX ORDER — CHOLECALCIFEROL (VITAMIN D3) 25 MCG
TABLET ORAL
COMMUNITY
Start: 2021-06-15 | End: 2021-12-09

## 2021-06-21 NOTE — NURSING NOTE
"Chief Complaint   Patient presents with     Postpartum Care     4/15/2021 Vaginal Boy 8 lbs 6.8 ozs   Pap due        Initial /66 (BP Location: Left arm, Cuff Size: Adult Regular)   Pulse 95   Wt 99.9 kg (220 lb 4.8 oz)   LMP 07/10/2020 (Approximate)   Breastfeeding No   BMI 36.66 kg/m   Estimated body mass index is 36.66 kg/m  as calculated from the following:    Height as of 21: 1.651 m (5' 5\").    Weight as of this encounter: 99.9 kg (220 lb 4.8 oz).  BP completed using cuff size: regular    Questioned patient about current smoking habits.  Pt. has never smoked.          The following HM Due: pap smear      Deidre Bacon CMA on 2021 at 3:26 PM  "

## 2021-06-22 NOTE — PROGRESS NOTES
6 week Postpartum Visit Note    S:  Karen Loya is here for her 6-week postpartum checkup. C/o of ongoing bilateral knee and hip pain along with bilateral wrist pain and finger joint pains.     Delivery Date: 4/15/2021.    Delivering provider:  Adelina Fung CNM    Type of delivery:  .  Perineum: 3rd degree laceration repaired (Dr. Nat Chong), bilateral periurethral      Infant gender:  boy, weight 8 pounds 6 oz.  Feeding Method:  .  Complications reported with feeding:  none, infant thriving .    Bleeding:  None.   Menses resumed:  Yes   Bowel/Urinary problems:  No    Contraception Planned:  Declines at this time as she conveys bad side effects in the past with birth control particularly Depo-provera   She  has not had intercourse since delivery..    Current tobacco use:  No  Hx of Abuse:  No  ================================================================  ROS: 10 point ROS neg other than the symptoms noted above in the HPI.     O:  EXAM:  /66 (BP Location: Left arm, Cuff Size: Adult Regular)   Pulse 95   Wt 99.9 kg (220 lb 4.8 oz)   LMP 07/10/2020 (Approximate)   Breastfeeding No   BMI 36.66 kg/m      General: healthy, alert and no distress  Psych: negative for sleep disturbance, anxiety, nervous breakdown, depression, thoughts of self-harm, thoughts of hurting someone else, agitation and hallucinations  Breasts:  Lactating, Nipples intact with no lesions, Non-tender and No S/S of yeast or mastitis  Abdomen: Soft, flat, non-tender  Incision:  None   Vulva:  deferred  Vagina:  deferred  Cervix:  deferred.    Uterus:  deferred    Adnexa:  deferred  Recto-vaginal:   deferred    A:   26 year old  s/p  here for 6 weeks  postpartum visit.     P:  Arthralgia: Referral to rheumatology  Contraception: Reviewed ParaGard as a non-hormonal options but patient still elects to defer  Feeding: Breast  Return for annual pelvic exam or PRN    Jaskaran Hancock MD  [facility]  [facility]

## 2021-07-01 ENCOUNTER — APPOINTMENT (OUTPATIENT)
Dept: INTERPRETER SERVICES | Facility: CLINIC | Age: 26
End: 2021-07-01
Payer: COMMERCIAL

## 2021-09-21 ENCOUNTER — OFFICE VISIT (OUTPATIENT)
Dept: URGENT CARE | Facility: URGENT CARE | Age: 26
End: 2021-09-21
Payer: COMMERCIAL

## 2021-09-21 VITALS
TEMPERATURE: 99 F | HEART RATE: 80 BPM | DIASTOLIC BLOOD PRESSURE: 78 MMHG | SYSTOLIC BLOOD PRESSURE: 119 MMHG | OXYGEN SATURATION: 100 % | WEIGHT: 233 LBS | RESPIRATION RATE: 18 BRPM | BODY MASS INDEX: 38.77 KG/M2

## 2021-09-21 DIAGNOSIS — Z32.01 PREGNANCY TEST POSITIVE: ICD-10-CM

## 2021-09-21 DIAGNOSIS — R11.2 NAUSEA AND VOMITING, INTRACTABILITY OF VOMITING NOT SPECIFIED, UNSPECIFIED VOMITING TYPE: ICD-10-CM

## 2021-09-21 DIAGNOSIS — R42 DIZZINESS: Primary | ICD-10-CM

## 2021-09-21 LAB — HCG UR QL: POSITIVE

## 2021-09-21 PROCEDURE — 99215 OFFICE O/P EST HI 40 MIN: CPT | Performed by: PHYSICIAN ASSISTANT

## 2021-09-21 PROCEDURE — 81025 URINE PREGNANCY TEST: CPT

## 2021-09-21 RX ORDER — PRENATAL VIT/IRON FUM/FOLIC AC 27MG-0.8MG
1 TABLET ORAL DAILY
Qty: 90 TABLET | Refills: 3 | Status: SHIPPED | OUTPATIENT
Start: 2021-09-21 | End: 2021-12-15

## 2021-09-21 RX ORDER — ONDANSETRON 4 MG/1
4 TABLET, ORALLY DISINTEGRATING ORAL EVERY 8 HOURS PRN
Qty: 20 TABLET | Refills: 0 | Status: SHIPPED | OUTPATIENT
Start: 2021-09-21 | End: 2021-12-15

## 2021-09-21 NOTE — PATIENT INSTRUCTIONS
(R42) Dizziness  (primary encounter diagnosis)  Comment:   Plan: HCG qualitative urine            (R11.2) Nausea and vomiting, intractability of vomiting not specified, unspecified vomiting type  Comment:   Plan: HCG qualitative urine            (Z32.01) Pregnancy test positive  Comment:   Plan: ondansetron (ZOFRAN-ODT) 4 MG ODT tab, Prenatal        Vit-Fe Fumarate-FA (PRENATAL MULTIVITAMIN         W/IRON) 27-0.8 MG tablet            Estimated due date 5/22/22    Consider Covid vaccination     Follow up with Obstetrics within 3-4 weeks

## 2021-09-21 NOTE — PROGRESS NOTES
Patient presents with:  Urgent Care: dizziness, fatigue, rapid heart beats and vomiting since 09/2021 - Pt reports she is pregnant, possibly one month. (Declined covid/strep test)    (R42) Dizziness  (primary encounter diagnosis)  Comment:   Plan: HCG qualitative urine            (R11.2) Nausea and vomiting, intractability of vomiting not specified, unspecified vomiting type  Comment:   Plan: HCG qualitative urine            (Z32.01) Pregnancy test positive  Comment:   Plan: ondansetron (ZOFRAN-ODT) 4 MG ODT tab, Prenatal        Vit-Fe Fumarate-FA (PRENATAL MULTIVITAMIN         W/IRON) 27-0.8 MG tablet            Estimated due date 5/22/22    Consider Covid vaccination     Follow up with Obstetrics within 3-4 weeks      45 minutes spent on the date of the encounter doing chart review, review of test results, interpretation of tests, patient visit, documentation and discussion with patient via remote      ANNY 5/22/22    Covid-19  Pt was evaluated during a global COVID-19 pandemic, which necessitated consideration that the patient might be at risk for infection with the SARS-CoV-2 virus that causes COVID-19.   Applicable protocols for evaluation were followed during the patient's care.   COVID-19 was considered as part of the patient's evaluation. The plan for testing is:  a test was ordered during this visit.     No severe headache, chest pain, shortness of breath  No additional infectious symptoms  Rest, isolate for 10 days, hydrate, test, follow up if worsening or new symptoms  HH members to isolate until test results, if positive isolate for 2 weeks and follow up for testing if symptoms occur  Red flags and emergent follow up discussed, and understood by patient  Follow up with PCP if symptoms worsen or fail to improve     Surgical mask, gown, shield, hairnet, gloves worn by provider        Patient Instructions   Follow up immediately with severe headache, chest pain, or shortness of breath     Rest,  isolate for 10 days, hydrate, follow up if worsening or new symptoms  Household members to isolate until test results, if positive isolate for 2 weeks and follow up for testing if symptoms occur         SUBJECTIVE:   Karen Loya is a 26 year old female who presents today with fatigue, dizziness, nausea and headache    LMP  8/20/21.  Denies any abdominal pain.    Patient declines Covid test.  She is not vaccinated.        Past Medical History:   Diagnosis Date     No pertinent past medical history 09/16/2020         Current Outpatient Medications   Medication Sig Dispense Refill     Multiple Vitamins-Iron (DAILY-EVAN/IRON/BETA-CAROTENE) TABS TAKE 1 TABLET BY MOUTH DAILY. (Patient not taking: Reported on 10/19/2020) 30 tablet 7     Social History     Tobacco Use     Smoking status: Never Smoker     Smokeless tobacco: Never Used   Substance Use Topics     Alcohol use: Not on file     Family History   Problem Relation Age of Onset     Diabetes Mother      Diabetes Father          ROS:    10 point ROS of systems including Constitutional, Eyes, Respiratory, Cardiovascular, Gastroenterology, Genitourinary, Integumentary, Muscularskeletal, Psychiatric ,neurological were all negative except for pertinent positives noted in my HPI       OBJECTIVE:  /78   Pulse 80   Temp 99  F (37.2  C) (Tympanic)   Resp 18   Wt 105.7 kg (233 lb)   SpO2 100%   BMI 38.77 kg/m    Physical Exam:  GENERAL APPEARANCE: healthy, alert and no distress  EYES: EOMI,  PERRL, conjunctiva clear  HENT: ear canals and TM's normal.  Nose and mouth without ulcers, erythema or lesions  RESP: lungs clear to auscultation - no rales, rhonchi or wheezes  CV: regular rates and rhythm, normal S1 S2, no murmur noted  ABDOMEN:  soft, nontender, no HSM or masses and bowel sounds normal  NEURO: Normal strength and tone, sensory exam grossly normal,  normal speech and mentation  SKIN: no suspicious lesions or rashes

## 2021-10-20 ENCOUNTER — HOSPITAL ENCOUNTER (EMERGENCY)
Facility: CLINIC | Age: 26
Discharge: HOME | End: 2021-10-20
Attending: EMERGENCY MEDICINE | Admitting: EMERGENCY MEDICINE
Payer: COMMERCIAL

## 2021-10-20 VITALS
HEART RATE: 66 BPM | SYSTOLIC BLOOD PRESSURE: 116 MMHG | RESPIRATION RATE: 18 BRPM | DIASTOLIC BLOOD PRESSURE: 57 MMHG | TEMPERATURE: 97.2 F | OXYGEN SATURATION: 100 %

## 2021-10-20 DIAGNOSIS — R42 DIZZINESS: ICD-10-CM

## 2021-10-20 DIAGNOSIS — O21.9 NAUSEA AND VOMITING IN PREGNANCY: ICD-10-CM

## 2021-10-20 LAB
ANION GAP SERPL CALCULATED.3IONS-SCNC: 6 MMOL/L (ref 3–14)
BUN SERPL-MCNC: 7 MG/DL (ref 7–30)
CALCIUM SERPL-MCNC: 8.5 MG/DL (ref 8.5–10.1)
CHLORIDE BLD-SCNC: 105 MMOL/L (ref 94–109)
CO2 SERPL-SCNC: 26 MMOL/L (ref 20–32)
CREAT SERPL-MCNC: 0.39 MG/DL (ref 0.52–1.04)
GFR SERPL CREATININE-BSD FRML MDRD: >90 ML/MIN/1.73M2
GLUCOSE BLD-MCNC: 93 MG/DL (ref 70–99)
POTASSIUM BLD-SCNC: 3.7 MMOL/L (ref 3.4–5.3)
SODIUM SERPL-SCNC: 137 MMOL/L (ref 133–144)

## 2021-10-20 PROCEDURE — 250N000011 HC RX IP 250 OP 636: Performed by: EMERGENCY MEDICINE

## 2021-10-20 PROCEDURE — 96361 HYDRATE IV INFUSION ADD-ON: CPT

## 2021-10-20 PROCEDURE — 36415 COLL VENOUS BLD VENIPUNCTURE: CPT | Performed by: EMERGENCY MEDICINE

## 2021-10-20 PROCEDURE — 96375 TX/PRO/DX INJ NEW DRUG ADDON: CPT

## 2021-10-20 PROCEDURE — 80048 BASIC METABOLIC PNL TOTAL CA: CPT | Performed by: EMERGENCY MEDICINE

## 2021-10-20 PROCEDURE — 96374 THER/PROPH/DIAG INJ IV PUSH: CPT

## 2021-10-20 PROCEDURE — 258N000003 HC RX IP 258 OP 636: Performed by: EMERGENCY MEDICINE

## 2021-10-20 PROCEDURE — 250N000013 HC RX MED GY IP 250 OP 250 PS 637: Performed by: EMERGENCY MEDICINE

## 2021-10-20 PROCEDURE — 99284 EMERGENCY DEPT VISIT MOD MDM: CPT | Mod: 25

## 2021-10-20 RX ORDER — CALCIUM CARBONATE 500 MG/1
1000 TABLET, CHEWABLE ORAL ONCE
Status: COMPLETED | OUTPATIENT
Start: 2021-10-20 | End: 2021-10-20

## 2021-10-20 RX ORDER — SODIUM CHLORIDE 9 MG/ML
INJECTION, SOLUTION INTRAVENOUS CONTINUOUS
Status: DISCONTINUED | OUTPATIENT
Start: 2021-10-20 | End: 2021-10-20 | Stop reason: HOSPADM

## 2021-10-20 RX ORDER — DIPHENHYDRAMINE HYDROCHLORIDE 50 MG/ML
25 INJECTION INTRAMUSCULAR; INTRAVENOUS ONCE
Status: COMPLETED | OUTPATIENT
Start: 2021-10-20 | End: 2021-10-20

## 2021-10-20 RX ORDER — DIPHENHYDRAMINE HCL 25 MG
25-50 CAPSULE ORAL EVERY 6 HOURS PRN
Qty: 30 CAPSULE | Refills: 0 | Status: SHIPPED | OUTPATIENT
Start: 2021-10-20 | End: 2021-12-09

## 2021-10-20 RX ORDER — METOCLOPRAMIDE 5 MG/1
5-10 TABLET ORAL EVERY 6 HOURS PRN
Qty: 30 TABLET | Refills: 0 | Status: SHIPPED | OUTPATIENT
Start: 2021-10-20 | End: 2021-12-15

## 2021-10-20 RX ORDER — METOCLOPRAMIDE HYDROCHLORIDE 5 MG/ML
5 INJECTION INTRAMUSCULAR; INTRAVENOUS ONCE
Status: COMPLETED | OUTPATIENT
Start: 2021-10-20 | End: 2021-10-20

## 2021-10-20 RX ADMIN — METOCLOPRAMIDE 5 MG: 5 INJECTION, SOLUTION INTRAMUSCULAR; INTRAVENOUS at 10:05

## 2021-10-20 RX ADMIN — SODIUM CHLORIDE 1000 ML: 9 INJECTION, SOLUTION INTRAVENOUS at 10:04

## 2021-10-20 RX ADMIN — DIPHENHYDRAMINE HYDROCHLORIDE 25 MG: 50 INJECTION INTRAMUSCULAR; INTRAVENOUS at 10:07

## 2021-10-20 RX ADMIN — CALCIUM CARBONATE (ANTACID) CHEW TAB 500 MG 1000 MG: 500 CHEW TAB at 10:11

## 2021-10-20 ASSESSMENT — ENCOUNTER SYMPTOMS
NAUSEA: 1
DIZZINESS: 1
VOMITING: 1

## 2021-10-20 NOTE — ED NOTES
Patient endorses decreased nausea. PO challenge initiated. Patient does report continued dizziness when moving that makes nausea worse.

## 2021-10-20 NOTE — ED PROVIDER NOTES
History   Chief Complaint:  Hyperemesis       The history is provided by the patient. The history is limited by a language barrier. A  was used (Swazi).      Karen Loya is a 26 year old female with history of nausea and vomiting in pregnancy who presents with hyperemesis. The patient is 8 weeks pregnant and this is her third pregnancy. Yesterday morning, she began vomiting, and since then her nausea, vomiting, and dizziness have worsened. She was prescribed Zofran during past pregnancies for nausea and vomiting, and did take Zofran for her current symptoms with minimal relief. She last vomited about one hour ago. She also reports a burning sensation in her stomach when vomiting. Denies vaginal pain or discharge.      Review of Systems   Gastrointestinal: Positive for nausea and vomiting.   Genitourinary: Negative for vaginal discharge and vaginal pain.   Neurological: Positive for dizziness.   All other systems reviewed and are negative.      Allergies:  The patient does not have any allergies    Medications:  Zofran    Past Medical History:     Pre-eclampsia in prior pregnancy  Nausea/vomiting in pregnancy  Maternal third degree perineal laceration  Fetal macrosomia in third trimester  Group B Streptococcus carrier  Anemia affecting pregnancy, third trimester  COVID-19 affecting pregnancy, third trimester  Post term pregnancy, antepartum  Post-dates pregnancy    Social History:  Presents alone    Physical Exam     Patient Vitals for the past 24 hrs:   BP Temp Temp src Pulse Resp SpO2   10/20/21 1130 -- -- -- -- -- 100 %   10/20/21 1125 -- -- -- -- -- 100 %   10/20/21 1120 -- -- -- -- -- 100 %   10/20/21 1115 -- -- -- -- -- 100 %   10/20/21 1110 -- -- -- -- -- 100 %   10/20/21 1105 98/43 -- -- 66 -- 100 %   10/20/21 0932 116/63 97.2  F (36.2  C) Temporal 78 18 100 %       Physical Exam  General: Adult female, ambulatory in room  Eyes: PERRL, Conjunctive within normal limits  ENT: Moist  mucous membranes, oropharynx clear.   CV: Normal S1S2. Regular rate and rhythm  Resp: Normal respiratory effort.  GI: Abdomen is soft and nontender.  MSK: Ambulatory.  Skin: Warm and dry. No rashes or lesions or ecchymoses on visible skin.  Neuro: Alert and oriented. Responds appropriately to all questions and commands. No focal findings appreciated. Normal muscle tone.  Psych: Appropriate mood and affect.     Emergency Department Course     Laboratory:  BMP: WNL (Creatinine 0.39 (L))    Emergency Department Course:  Reviewed:  I reviewed nursing notes, vitals, past medical history and Care Everywhere    Assessments:  0938 I obtained history and examined the patient as noted above.     1133 I rechecked the patient and explained findings. The patient is eating crackers and feeling improved.  She notes mild ongoing dizziness but feels comfortable plan for discharge home.    Interventions:  1004 0.9% sodium chloride 1000 mL IV    1005 Reglan 5 mg IV    1007 Benadryl 25 mg IV    1011 Calcium carbonate 1000 mg PO    Disposition:  The patient was discharged to home.     Impression & Plan       Medical Decision Making:    Karen Loya is a 26 year old female who presents for evaluation of nausea and vomiting with associated dizziness she describes as spinning which she has had in previous pregnancies.  She is currently pregnant.  Nonetheless. I considered a broad differential diagnosis for this patient including viral gastroenteritis, food poisoning, bowel obstruction, intra-abdominal infection such as colitis, cholecystitis, UTI, pyelonephritis, appendicitis, etc.  There are no signs of worrisome intra-abdominal pathologies detected during the visit today.  The patient has a completely benign abdominal exam without rebound, guarding, or marked tenderness to palpation.  Regards to dizziness, it does not seem orthostatic in nature.  She is not significantly anemic.  Her electrolytes are not abnormal.  It is possible she  is mildly dehydrated and/or has vertigo associated pregnancy.  There are no focal neurologic findings, headache, and my suspicion for central cause is low.  She does not have any associated hearing changes.  No recent URI/fever.  She had some improvement with Benadryl, antiemetics and IV fluids.    Supportive outpatient management is therefore indicated.  Vomiting precautions are given for home.    It was discussed with the patient to return to the ED for blood in stool, increasing pain, or fevers more than 102.  She is recommended follow-up with her OB within 1 week with ongoing symptoms.  See above for medications for home.  She should return to the emergency department immediately with worsening.      Diagnosis:    ICD-10-CM    1. Nausea and vomiting in pregnancy  O21.9    2. Dizziness  R42        Discharge Medications:  New Prescriptions    DIPHENHYDRAMINE (BENADRYL) 25 MG CAPSULE    Take 1-2 capsules (25-50 mg) by mouth every 6 hours as needed for other (nausea, dizziness)    METOCLOPRAMIDE (REGLAN) 5 MG TABLET    Take 1-2 tablets (5-10 mg) by mouth every 6 hours as needed       Scribe Disclosure:  I, Corwin Christine, am serving as a scribe at 9:36 AM on 10/20/2021 to document services personally performed by Johana Felder MD based on my observations and the provider's statements to me.              Johana Felder MD  10/21/21 0845

## 2021-11-10 ENCOUNTER — TELEPHONE (OUTPATIENT)
Dept: OBGYN | Facility: CLINIC | Age: 26
End: 2021-11-10

## 2021-11-10 NOTE — TELEPHONE ENCOUNTER
Reason for Call:  Other appointment    Detailed comments: Pt needs to schedule her initial OB appt, she will be 3 months in two weeks.     Phone Number Patient can be reached at: Home number on file [phone] (home)    Best Time: anytime    Can we leave a detailed message on this number? YES    Call taken on 11/10/2021 at 5:25 PM by Clari Martin

## 2021-12-03 ENCOUNTER — TELEPHONE (OUTPATIENT)
Dept: OBGYN | Facility: CLINIC | Age: 26
End: 2021-12-03
Payer: COMMERCIAL

## 2021-12-03 NOTE — TELEPHONE ENCOUNTER
Pt called, I got a [place]  on the phone.    She is pregnant, states about 6 months.    No PNC, set up for appts.    Kaylee Choe RN

## 2021-12-07 ENCOUNTER — PRENATAL OFFICE VISIT (OUTPATIENT)
Dept: NURSING | Facility: CLINIC | Age: 26
End: 2021-12-07
Payer: COMMERCIAL

## 2021-12-07 DIAGNOSIS — Z34.90 SUPERVISION OF NORMAL PREGNANCY: Primary | ICD-10-CM

## 2021-12-07 NOTE — PROGRESS NOTES
"Unable to do phone visit today.  Unable to reach pt as she was driving \"to the hospital\"  Spoke with FOB. Appt rescheduled to Thurs 12/9 at 0900. PHONE VISIT  Dilma Silva RN    "

## 2021-12-09 ENCOUNTER — PRENATAL OFFICE VISIT (OUTPATIENT)
Dept: NURSING | Facility: CLINIC | Age: 26
End: 2021-12-09
Payer: COMMERCIAL

## 2021-12-09 DIAGNOSIS — Z34.90 SUPERVISION OF NORMAL PREGNANCY: Primary | ICD-10-CM

## 2021-12-09 PROCEDURE — 99207 PR NO CHARGE NURSE ONLY: CPT

## 2021-12-09 NOTE — PROGRESS NOTES
NPN nurse visit done over the phone. Pt will be given NPN folder and book at her upcoming appt.   Discussed optional screening available to assess chromosomal anomalies. Questions answered. Pt advised to call the clinic if she has any questions or concerns related to her pregnancy. Prenatal labs will be obtained at her upcoming appt. New prenatal visit scheduled on 12/15/21 with Adelina Fung CNM.    16w0d per LMP. Pt has not had PNC yet.    No results found for: PAP        Patient supplied answers from flow sheet for:  Prenatal OB Questionnaire.  Past Medical History  Have you ever recieved care for your mental health? : No  Have you ever been in a major accident or suffered serious trauma?: No  Within the last year, has anyone hit, slapped, kicked or otherwise hurt you?: No  In the last year, has anyone forced you to have sex when you didn't want to?: No    Past Medical History 2   Have you ever received a blood transfusion?: No  Would you accept a blood transfusion if was medically recommended?: Yes  Does anyone in your home smoke?: No   Is your blood type Rh negative?: No  Have you ever ?: (!) Yes  Have you been hospitalized for a nonsurgical reason excluding normal delivery?: No  Have you ever had an abnormal pap smear?: No    Past Medical History (Continued)  Do you have a history of abnormalities of the uterus?: No  Did your mother take JEFF or any other hormones when she was pregnant with you?: Unknown  Do you have any other problems we have not asked about which you feel may be important to this pregnancy?: No       Kaylee Choe RN

## 2021-12-13 ENCOUNTER — LAB (OUTPATIENT)
Dept: LAB | Facility: CLINIC | Age: 26
End: 2021-12-13
Payer: COMMERCIAL

## 2021-12-13 DIAGNOSIS — Z34.90 SUPERVISION OF NORMAL PREGNANCY: Primary | ICD-10-CM

## 2021-12-13 DIAGNOSIS — Z34.90 SUPERVISION OF NORMAL PREGNANCY: ICD-10-CM

## 2021-12-13 LAB
ABO/RH(D): NORMAL
ANTIBODY SCREEN: NEGATIVE
ERYTHROCYTE [DISTWIDTH] IN BLOOD BY AUTOMATED COUNT: 21.3 % (ref 10–15)
HCT VFR BLD AUTO: 29.5 % (ref 35–47)
HGB BLD-MCNC: 8.8 G/DL (ref 11.7–15.7)
MCH RBC QN AUTO: 21.4 PG (ref 26.5–33)
MCHC RBC AUTO-ENTMCNC: 29.8 G/DL (ref 31.5–36.5)
MCV RBC AUTO: 72 FL (ref 78–100)
PLATELET # BLD AUTO: 211 10E3/UL (ref 150–450)
RBC # BLD AUTO: 4.11 10E6/UL (ref 3.8–5.2)
SPECIMEN EXPIRATION DATE: NORMAL
WBC # BLD AUTO: 8.1 10E3/UL (ref 4–11)

## 2021-12-13 PROCEDURE — 86901 BLOOD TYPING SEROLOGIC RH(D): CPT

## 2021-12-13 PROCEDURE — 87389 HIV-1 AG W/HIV-1&-2 AB AG IA: CPT

## 2021-12-13 PROCEDURE — 87340 HEPATITIS B SURFACE AG IA: CPT

## 2021-12-13 PROCEDURE — 82728 ASSAY OF FERRITIN: CPT

## 2021-12-13 PROCEDURE — 86762 RUBELLA ANTIBODY: CPT

## 2021-12-13 PROCEDURE — 86780 TREPONEMA PALLIDUM: CPT

## 2021-12-13 PROCEDURE — 86803 HEPATITIS C AB TEST: CPT

## 2021-12-13 PROCEDURE — 36415 COLL VENOUS BLD VENIPUNCTURE: CPT

## 2021-12-13 PROCEDURE — 86850 RBC ANTIBODY SCREEN: CPT

## 2021-12-13 PROCEDURE — 86900 BLOOD TYPING SEROLOGIC ABO: CPT

## 2021-12-13 PROCEDURE — 85027 COMPLETE CBC AUTOMATED: CPT

## 2021-12-14 PROBLEM — O98.513 COVID-19 AFFECTING PREGNANCY IN THIRD TRIMESTER: Status: RESOLVED | Noted: 2021-04-09 | Resolved: 2021-12-14

## 2021-12-14 PROBLEM — O09.299 HISTORY OF MATERNAL THIRD DEGREE PERINEAL LACERATION, CURRENTLY PREGNANT: Status: RESOLVED | Noted: 2020-09-25 | Resolved: 2021-12-14

## 2021-12-14 PROBLEM — U07.1 COVID-19 AFFECTING PREGNANCY IN THIRD TRIMESTER: Status: RESOLVED | Noted: 2021-04-09 | Resolved: 2021-12-14

## 2021-12-14 PROBLEM — O99.013 ANEMIA AFFECTING PREGNANCY IN THIRD TRIMESTER: Status: RESOLVED | Noted: 2021-04-06 | Resolved: 2021-12-14

## 2021-12-14 PROBLEM — O99.820 GBS (GROUP B STREPTOCOCCUS CARRIER), +RV CULTURE, CURRENTLY PREGNANT: Status: RESOLVED | Noted: 2021-03-15 | Resolved: 2021-12-14

## 2021-12-14 LAB
FERRITIN SERPL-MCNC: 5 NG/ML (ref 12–150)
HBV SURFACE AG SERPL QL IA: NONREACTIVE
HCV AB SERPL QL IA: NONREACTIVE
HIV 1+2 AB+HIV1 P24 AG SERPL QL IA: NONREACTIVE
RUBV IGG SERPL QL IA: 30.5 INDEX
RUBV IGG SERPL QL IA: POSITIVE
T PALLIDUM AB SER QL: NONREACTIVE

## 2021-12-15 ENCOUNTER — TRANSCRIBE ORDERS (OUTPATIENT)
Dept: MATERNAL FETAL MEDICINE | Facility: CLINIC | Age: 26
End: 2021-12-15

## 2021-12-15 ENCOUNTER — PRENATAL OFFICE VISIT (OUTPATIENT)
Dept: OBGYN | Facility: CLINIC | Age: 26
End: 2021-12-15
Payer: COMMERCIAL

## 2021-12-15 VITALS
SYSTOLIC BLOOD PRESSURE: 116 MMHG | BODY MASS INDEX: 38.97 KG/M2 | HEIGHT: 65 IN | WEIGHT: 233.9 LBS | DIASTOLIC BLOOD PRESSURE: 76 MMHG

## 2021-12-15 DIAGNOSIS — Z86.39 HISTORY OF VITAMIN D DEFICIENCY: ICD-10-CM

## 2021-12-15 DIAGNOSIS — O99.012 ANEMIA AFFECTING PREGNANCY IN SECOND TRIMESTER: ICD-10-CM

## 2021-12-15 DIAGNOSIS — Z34.82 ENCOUNTER FOR SUPERVISION OF OTHER NORMAL PREGNANCY IN SECOND TRIMESTER: Primary | ICD-10-CM

## 2021-12-15 DIAGNOSIS — Z87.59 HISTORY OF PRE-ECLAMPSIA: ICD-10-CM

## 2021-12-15 DIAGNOSIS — O09.299 HISTORY OF PRE-ECLAMPSIA IN PRIOR PREGNANCY, CURRENTLY PREGNANT: ICD-10-CM

## 2021-12-15 DIAGNOSIS — Z87.59 HISTORY OF THIRD DEGREE PERINEAL LACERATION: ICD-10-CM

## 2021-12-15 DIAGNOSIS — O26.90 PREGNANCY RELATED CONDITION, ANTEPARTUM: Primary | ICD-10-CM

## 2021-12-15 DIAGNOSIS — O99.212 OBESITY AFFECTING PREGNANCY IN SECOND TRIMESTER: ICD-10-CM

## 2021-12-15 PROBLEM — O21.9 NAUSEA/VOMITING IN PREGNANCY: Status: RESOLVED | Noted: 2020-09-25 | Resolved: 2021-12-15

## 2021-12-15 PROBLEM — O48.0 POST-DATES PREGNANCY: Status: RESOLVED | Noted: 2021-04-14 | Resolved: 2021-12-15

## 2021-12-15 PROBLEM — O48.0 POST TERM PREGNANCY, ANTEPARTUM: Status: RESOLVED | Noted: 2021-04-13 | Resolved: 2021-12-15

## 2021-12-15 PROCEDURE — 87086 URINE CULTURE/COLONY COUNT: CPT | Performed by: ADVANCED PRACTICE MIDWIFE

## 2021-12-15 PROCEDURE — 82306 VITAMIN D 25 HYDROXY: CPT | Performed by: ADVANCED PRACTICE MIDWIFE

## 2021-12-15 PROCEDURE — 36415 COLL VENOUS BLD VENIPUNCTURE: CPT | Performed by: ADVANCED PRACTICE MIDWIFE

## 2021-12-15 PROCEDURE — 99207 PR FIRST OB VISIT: CPT | Performed by: ADVANCED PRACTICE MIDWIFE

## 2021-12-15 RX ORDER — PRENATAL VIT/IRON FUM/FOLIC AC 27MG-0.8MG
1 TABLET ORAL DAILY
Qty: 90 TABLET | Refills: 3 | Status: SHIPPED | OUTPATIENT
Start: 2021-12-15

## 2021-12-15 RX ORDER — MULTIVIT WITH MINERALS/LUTEIN
250 TABLET ORAL DAILY
Qty: 90 TABLET | Refills: 3 | Status: SHIPPED | OUTPATIENT
Start: 2021-12-15 | End: 2023-01-02

## 2021-12-15 RX ORDER — LANOLIN ALCOHOL/MO/W.PET/CERES
1000 CREAM (GRAM) TOPICAL DAILY
Qty: 90 TABLET | Refills: 3 | Status: SHIPPED | OUTPATIENT
Start: 2021-12-15 | End: 2023-01-02

## 2021-12-15 RX ORDER — FERROUS SULFATE 325(65) MG
325 TABLET ORAL EVERY OTHER DAY
Qty: 30 TABLET | Refills: 4 | Status: SHIPPED | OUTPATIENT
Start: 2021-12-15 | End: 2023-01-02

## 2021-12-15 RX ORDER — ASPIRIN 81 MG/1
81 TABLET, CHEWABLE ORAL DAILY
Qty: 90 TABLET | Refills: 3 | Status: ON HOLD | OUTPATIENT
Start: 2021-12-15 | End: 2022-05-29

## 2021-12-15 ASSESSMENT — MIFFLIN-ST. JEOR: SCORE: 1801.84

## 2021-12-15 NOTE — NURSING NOTE
"Chief Complaint   Patient presents with     Prenatal Care     New Prenatal, 16w 6d     No Show     No Showed Ultrasound appointment on 2021       Initial /76 (BP Location: Right arm, Cuff Size: Adult Regular)   Ht 1.651 m (5' 5\")   Wt 106.1 kg (233 lb 14.4 oz)   LMP 2021   BMI 38.92 kg/m   Estimated body mass index is 38.92 kg/m  as calculated from the following:    Height as of this encounter: 1.651 m (5' 5\").    Weight as of this encounter: 106.1 kg (233 lb 14.4 oz).  BP completed using cuff size: regular    Questioned patient about current smoking habits.  Pt. has never smoked.          The following HM Due: pap smear      "

## 2021-12-15 NOTE — PROGRESS NOTES
Karen Loya is a 26 year old  [place] woman,  who is a previous CNM patient. She presents for a new OB Visit. This was a planned pregnancy.     FOB is Stephen who is in good health.  FOSHANIA IS actively involved in relationship and this pregnancy.       She has not had bleeding since her LMP.    She denies abdominal pain since her LMP.  She has not had nausea.  has not had vomiting.  Any personal or family history of blood clots? No  History of sickle cell anemia or trait? No         Patient's last menstrual period was 2021..  Estimated Date of Delivery: May 26, 2022 Ultrasound consistent with LMP.    MENSTRUAL HISTORY    frequency: every 28 days  Last PAP:  Unsure, not in records   History of abnormal Pap?  No    Current medications are:  No current outpatient medications on file.     INFECTION HISTORY  HIV: No  Hepatitis B: No  Hepatitis C: No  Tuberculosis: No    Genital Herpes self: no  Herpes partner:  no and at risk  Chlamydia:  no  Gonorrhea:  no  HPV: No  BV:  No  Syphilis:  No  Chicken Pox:  No - had vaccine      OB HISTORY  OB History    Para Term  AB Living   3 2 2 0 0 2   SAB IAB Ectopic Multiple Live Births   0 0 0 0 2      # Outcome Date GA Lbr Robert/2nd Weight Sex Delivery Anes PTL Lv   3 Current            2 Term 04/15/21 41w2d 05:17 / 02:04 3.82 kg (8 lb 6.8 oz) M Vag-Spont EPI N MARLA      Name: KATEY LOYA-KAREN      Apgar1: 8  Apgar5: 9   1 Term 19 40w0d  3.175 kg (7 lb) M Vag-Spont   MARLA      Birth Comments: partial 3rd degree lac, IOL for GHTN       History of GDM: No,  PTL : No,  History of HTN in pregnancy: yes - preeclamspia first pregnancy   Thrombocytopenia: No,  Shoulder dystocia: No,  Vacuum Extraction: No  PPH: No   3rd of 4th degree laceration: Yes - third degree laceration x2.   Other complications:  Pregnancy in  had suspected macrosomia followed with twice weekly testing. Pt underwent IOL, was noted to have cat II fetal tracing and c/s was  recommended ,however pt declined and discharged home. She did agree to return a week later for IOL, which was complicated by another 3rd degree lac. Baby weighed 8lb 6oz at birth.     PERSONAL HISTORY  Exercise Habits:  none in pregnancy   Employment: [employer]  Travel plans:  are none planned.   Diet: eats regular meals  Prenatal vitamins? No  Fish Oil? No  Abuse concerns? No  Any history if abuse, varbal, physical, sexual? No  Hgb A1c screen:  BMI > 30: Yes, 1st degree family DM: No, History of GDM: No, PCOS: No, High risk ethnicity: No    Low Dose Aspirin for Preeclampsia Prevention:  Consider for those with 1 high risk or 2  moderate risk factors    High risk: previous pregnancy with preeclampsia, multifetal gestation, chronic htn, diabetes, chronic kidney disease, autoimmune disorder    Medium risk: nulliparity, BMI >30, family hx preeclampsia, age >/= 35,  race, low SES, personal risk factors (hx low birth weight, hx stillbirth, >10yrs between pregnancies).     Patient does qualify for low dose aspirin therapy      Social History     Socioeconomic History     Marital status:      Spouse name: [facility]     Number of children: 2     Years of education: Not on file     Highest education level: Not on file   Occupational History     Not on file   Tobacco Use     Smoking status: Never Smoker     Smokeless tobacco: Never Used   Vaping Use     Vaping Use: Never used   Substance and Sexual Activity     Alcohol use: Never     Drug use: Never     Sexual activity: Yes     Partners: Male     Comment: Pregnant   Other Topics Concern     Not on file   Social History Narrative     Not on file     Social Determinants of Health     Financial Resource Strain: Not on file   Food Insecurity: Not on file   Transportation Needs: Not on file   Physical Activity: Not on file   Stress: Not on file   Social Connections: Not on file   Intimate Partner Violence: Not on file   Housing Stability: Not on file         She   "reports that she has never smoked. She has never used smokeless tobacco.    STD testing offered?  Declined  Last PHQ-9 score on record = No flowsheet data found.  Last GAD7 score on record = No flowsheet data found.      PAST MEDICAL/SURGICAL HISTORY  Past Medical History:   Diagnosis Date     No pertinent past medical history 2020     Past Surgical History:   Procedure Laterality Date     NO HISTORY OF SURGERY  2020       FAMILY HISTORY  History reviewed. No pertinent family history.      ROS:   ROS: 10 point ROS neg other than the symptoms noted above in the HPI.    PHYSICAL EXAM  Vitals: /76 (BP Location: Right arm, Cuff Size: Adult Regular)   Ht 1.651 m (5' 5\")   Wt 106.1 kg (233 lb 14.4 oz)   LMP 2021   BMI 38.92 kg/m    BMI= Body mass index is 38.92 kg/m .     GENERAL:  26 year old pleasant pregnant female, alert, cooperative and well groomed.  NECK:  Thyroid without enlargement and nodules.  Lymph nodes not palpable.   LUNGS:  Clear to auscultation.  BREAST:  Symmetrical without lesions or nodes.  Nipples everted.  Areolas symmetric.  No palpable axillary nodes.  HEART:  RRR without murmur.  ABDOMEN: Soft without masses or tenderness.  No scars noted.. FHTs 150s  LOWER EXTREMITIES: No edema. No significant varicosities.    ASSESSMENT/PLAN:    IUP at 16w6d   consult for US for AMA patients: NA  Genetic Testing reviewed and discussed, patient desires to have NIPT done.     1. (Z34.82) Encounter for supervision of other normal pregnancy in second trimester  (primary encounter diagnosis)  Plan: Prenatal Vit-Fe Fumarate-FA (PRENATAL         MULTIVITAMIN W/IRON) 27-0.8 MG tablet, [employer]         Non-Invasive Prenatal Screening, CANCELED: US         OB > 14 Weeks  - Genetic testing reviewed, risks/benefits explained. Desires NIPT today.   - Missed dating US, will refer to [facility] for anatomy as she is already 17wks  - Pt is due for pap today (not on file) but she declines until next " visit    2. (O99.212) Obesity affecting pregnancy in second trimester  Plan: [facility] Referral - Pregnancy      BMI 38 pre preg  Ref to MFM for level II    3. (Z86.39) History of vitamin D deficiency  Plan: Vitamin D Deficiency  - self reported hx of vit d deficiency, requesting vit d drawn today     4. (O99.012) Anemia affecting pregnancy in second trimester  Plan: ferrous sulfate (FEROSUL) 325 (65 Fe) MG         tablet, vitamin C (ASCORBIC ACID) 250 MG         tablet, cyanocobalamin (VITAMIN B-12) 1000 MCG         tablet  Hgb 8.8 with ferritin 5 at IOB  Course of oral iron/vit c/b 12 started at 17wks  Recheck CBC at 21 wks [ ]       5. (Z87.59) History of pre-eclampsia  Plan: aspirin (ASA) 81 MG chewable tablet      6. (Z87.59) History of third degree perineal laceration  x2  Did not discuss today, plan to provide counseling on reoccur ance and offer referral to physician to discuss       COUNSELING    Instructed on use of triage nurse line and contacting the on call CNM after hours in an emergency.     Symptoms of N&V and fatigue usually start to resolve around 12-16 weeks     Reviewed CNM philosophy, call schedule for labor and delivery, and FR for delivery    1st OB handout given outlining appointment spacing and CNM information    Reviewed exercise and nutrition    Recommend to gain 15 pounds with her pregnancy.    Discussed OTC medications. OB med list given    Encouraged patient to arrange  if needed    Encouraged patient to take PNV's/DHA    Travel precautions discussed, no air travel after 36 weeks and Zika Virus discussed    Will call patient with lab results when available      F/U to be addressed next visit:  Pap, make sure taking iron and asa     Will return to the clinic in 4 weeks for her next routine prenatal check.  Will call to be seen sooner if problems arise.    LAXMI Tamez, CNM

## 2021-12-16 LAB — DEPRECATED CALCIDIOL+CALCIFEROL SERPL-MC: 9 UG/L (ref 20–75)

## 2021-12-17 LAB — BACTERIA UR CULT: NO GROWTH

## 2021-12-20 DIAGNOSIS — E55.9 VITAMIN D DEFICIENCY: Primary | ICD-10-CM

## 2021-12-20 RX ORDER — CHOLECALCIFEROL (VITAMIN D3) 50 MCG
0.5 TABLET ORAL DAILY
Qty: 45 TABLET | Refills: 0 | Status: SHIPPED | OUTPATIENT
Start: 2021-12-20 | End: 2022-03-20

## 2021-12-23 LAB — SCANNED LAB RESULT: NORMAL

## 2021-12-29 ENCOUNTER — PRE VISIT (OUTPATIENT)
Dept: MATERNAL FETAL MEDICINE | Facility: CLINIC | Age: 26
End: 2021-12-29
Payer: COMMERCIAL

## 2022-01-05 ENCOUNTER — HOSPITAL ENCOUNTER (OUTPATIENT)
Dept: ULTRASOUND IMAGING | Facility: CLINIC | Age: 27
End: 2022-01-05
Attending: ADVANCED PRACTICE MIDWIFE
Payer: COMMERCIAL

## 2022-01-05 ENCOUNTER — OFFICE VISIT (OUTPATIENT)
Dept: MATERNAL FETAL MEDICINE | Facility: CLINIC | Age: 27
End: 2022-01-05
Attending: ADVANCED PRACTICE MIDWIFE
Payer: COMMERCIAL

## 2022-01-05 DIAGNOSIS — O26.90 PREGNANCY RELATED CONDITION, ANTEPARTUM: ICD-10-CM

## 2022-01-05 DIAGNOSIS — Z36.89 ENCOUNTER FOR ULTRASOUND TO CHECK FETAL GROWTH: Primary | ICD-10-CM

## 2022-01-05 PROCEDURE — 76811 OB US DETAILED SNGL FETUS: CPT

## 2022-01-05 PROCEDURE — 76811 OB US DETAILED SNGL FETUS: CPT | Mod: 26 | Performed by: OBSTETRICS & GYNECOLOGY

## 2022-01-05 NOTE — PROGRESS NOTES
"Please see \"Imaging\" tab under Chart Review for full details.    Leann Duarte MD  Maternal Fetal Medicine    "

## 2022-02-23 ENCOUNTER — HOSPITAL ENCOUNTER (OUTPATIENT)
Dept: ULTRASOUND IMAGING | Facility: CLINIC | Age: 27
End: 2022-02-23
Attending: OBSTETRICS & GYNECOLOGY
Payer: COMMERCIAL

## 2022-02-23 ENCOUNTER — OFFICE VISIT (OUTPATIENT)
Dept: MATERNAL FETAL MEDICINE | Facility: CLINIC | Age: 27
End: 2022-02-23
Attending: OBSTETRICS & GYNECOLOGY
Payer: COMMERCIAL

## 2022-02-23 DIAGNOSIS — Z36.89 ENCOUNTER FOR ULTRASOUND TO CHECK FETAL GROWTH: Primary | ICD-10-CM

## 2022-02-23 DIAGNOSIS — Z36.89 ENCOUNTER FOR ULTRASOUND TO CHECK FETAL GROWTH: ICD-10-CM

## 2022-02-23 PROCEDURE — 76816 OB US FOLLOW-UP PER FETUS: CPT

## 2022-02-23 PROCEDURE — 76816 OB US FOLLOW-UP PER FETUS: CPT | Mod: 26 | Performed by: OBSTETRICS & GYNECOLOGY

## 2022-03-01 ENCOUNTER — VIRTUAL VISIT (OUTPATIENT)
Dept: INTERPRETER SERVICES | Facility: CLINIC | Age: 27
End: 2022-03-01

## 2022-03-01 ENCOUNTER — PRENATAL OFFICE VISIT (OUTPATIENT)
Dept: MIDWIFE SERVICES | Facility: CLINIC | Age: 27
End: 2022-03-01
Payer: COMMERCIAL

## 2022-03-01 VITALS — DIASTOLIC BLOOD PRESSURE: 62 MMHG | SYSTOLIC BLOOD PRESSURE: 98 MMHG | BODY MASS INDEX: 38.24 KG/M2 | WEIGHT: 229.8 LBS

## 2022-03-01 DIAGNOSIS — O99.013 ANEMIA OF MOTHER IN PREGNANCY, ANTEPARTUM, THIRD TRIMESTER: ICD-10-CM

## 2022-03-01 DIAGNOSIS — O09.33 INSUFFICIENT PRENATAL CARE IN THIRD TRIMESTER: ICD-10-CM

## 2022-03-01 DIAGNOSIS — O09.93 SUPERVISION OF HIGH RISK PREGNANCY IN THIRD TRIMESTER: Primary | ICD-10-CM

## 2022-03-01 DIAGNOSIS — Z87.59 HISTORY OF THIRD DEGREE PERINEAL LACERATION: ICD-10-CM

## 2022-03-01 LAB
ERYTHROCYTE [DISTWIDTH] IN BLOOD BY AUTOMATED COUNT: 21.2 % (ref 10–15)
GLUCOSE 1H P 50 G GLC PO SERPL-MCNC: 154 MG/DL (ref 70–129)
HCT VFR BLD AUTO: 29.5 % (ref 35–47)
HGB BLD-MCNC: 8.4 G/DL (ref 11.7–15.7)
MCH RBC QN AUTO: 21 PG (ref 26.5–33)
MCHC RBC AUTO-ENTMCNC: 28.5 G/DL (ref 31.5–36.5)
MCV RBC AUTO: 74 FL (ref 78–100)
PLATELET # BLD AUTO: 209 10E3/UL (ref 150–450)
RBC # BLD AUTO: 4 10E6/UL (ref 3.8–5.2)
WBC # BLD AUTO: 8.2 10E3/UL (ref 4–11)

## 2022-03-01 PROCEDURE — 85027 COMPLETE CBC AUTOMATED: CPT | Performed by: ADVANCED PRACTICE MIDWIFE

## 2022-03-01 PROCEDURE — 86780 TREPONEMA PALLIDUM: CPT | Performed by: ADVANCED PRACTICE MIDWIFE

## 2022-03-01 PROCEDURE — 99207 PR PRENATAL VISIT: CPT | Performed by: ADVANCED PRACTICE MIDWIFE

## 2022-03-01 PROCEDURE — 36415 COLL VENOUS BLD VENIPUNCTURE: CPT | Performed by: ADVANCED PRACTICE MIDWIFE

## 2022-03-01 PROCEDURE — 82950 GLUCOSE TEST: CPT | Performed by: ADVANCED PRACTICE MIDWIFE

## 2022-03-01 RX ORDER — CHOLECALCIFEROL (VITAMIN D3) 25 MCG
1 TABLET ORAL DAILY
COMMUNITY
Start: 2021-12-21 | End: 2023-01-02

## 2022-03-01 NOTE — PATIENT INSTRUCTIONS
Patient Education   Patient Education     Anemia  Anemia is a condition that occurs when your body does not have enough healthy red blood cells (RBCs). RBCs are the parts of your blood that carry oxygen all over your body. A protein called hemoglobin allows your RBCs to absorb and release oxygen. Without enough RBCs or hemoglobin, your body doesn't get enough oxygen. Symptoms of anemia may then occur.    What are the symptoms of anemia?  Some people with anemia have no symptoms. But most people have symptoms that range from mild to severe. These can include:    Tiredness (fatigue)    Weakness    Pale skin    Shortness of breath    Dizziness or fainting    Rapid heartbeat    Trouble doing normal amounts of activity    Yellowing of your eyes, skin, or mouth and dark urine (jaundice)  What causes anemia?  Anemia can occur when your body:    Loses too much blood    Does not make enough RBCs    Destroys your RBCs at a faster rate than it can replace them    Does not make a normal amount of hemoglobin in your RBCs  These problems can occur for many reasons, including:    A condition that you are born with (congenital or inherited), such as sickle cell disease or thalassemia    Heavy bleeding for any reason, including injury, surgery, childbirth, or even heavy menstrual periods    Being low in certain nutrients, such as iron, folate, or vitamin B-12    Certain long-term (chronic) conditions such as diabetes, arthritis, or kidney disease    Certain chronic infections such as tuberculosis or HIV    Exposure to certain medicines, such as those used for chemotherapy  There are different types of anemia. Your healthcare provider can tell you more about the type of anemia you have and what may have caused it.  How is anemia diagnosed?  To diagnose anemia, your healthcare provider orders blood tests. These can include:    Complete blood cell count (CBC). This test measures the amounts of the different types of blood  cells.    Blood smear. This test checks the size and shape of your blood cells. To do the test, a drop of your blood is looked at under a microscope. A stain is used to make the blood cells easier to see.    Iron studies. These tests measure the amount of iron in your blood. Your body needs iron to make hemoglobin in your RBCs.    Vitamin B-12 and folate studies. These tests check for some of the components that help give RBCs a normal size and shape.    Reticulocyte count. This test measures the amount of new RBCs that your bone marrow makes.    Hemoglobin electrophoresis. This test checks for problems with your hemoglobin in RBCs.    Bone marrow biopsy. This test evaluates the bone marrow where RBCs are made.  How is anemia treated?  Treatment for anemia is based on the type of anemia, its cause, and the severity of your symptoms. Treatments may include:    Diet changes. This includes increasing the amount of certain nutrients in your diet, such as iron, vitamin B-12, or folate. Your healthcare provider may also prescribe nutrient supplements.    Medicines. Certain medicines treat the cause of your anemia. Others help build new RBCs or ease symptoms. If a medicine is the cause of your anemia, you may need to stop or change it.    Blood transfusions. Replacing some of your blood can increase the number of healthy RBCs in your body.    Surgery. In some cases, your healthcare provider may do surgery to treat the underlying cause of anemia. If you need surgery, your healthcare provider will explain the procedure and outline the risks and benefits for you.  What are the long-term concerns?  If you have a certain type of anemia, you can expect a full recovery after treatment. If you have other types of anemia (especially a type you're born with), you will need to manage it for life. Your healthcare provider can tell you more.  [employer] last reviewed this educational content on 4/1/2019 2000-2021 [employer]  [employer]. All rights reserved. This information is not intended as a substitute for professional medical care. Always follow your healthcare professional's instructions.           Labor and Birth Position Guide  Changing your position in labor helps with comfort. It also helps your labor progress so your baby can be born. Here are some positions to try.  The Labor Ball        Standing, Leaning, Walking          The Cub        The Squat Bar The Peanut Ball            The Sling and Rebozo                For informational purposes only. Not to replace the advice of your health care provider.   Copyright   2016 [employer]. All rights reserved. [employer] [id number] - 2/17.

## 2022-03-01 NOTE — NURSING NOTE
"Chief Complaint   Patient presents with     Prenatal Care     29w 4d GCT today, declines tdap       Initial BP 98/62 (BP Location: Left arm, Cuff Size: Adult Large)   Wt 104.2 kg (229 lb 12.8 oz)   LMP 2021   BMI 38.24 kg/m   Estimated body mass index is 38.24 kg/m  as calculated from the following:    Height as of 12/15/21: 1.651 m (5' 5\").    Weight as of this encounter: 104.2 kg (229 lb 12.8 oz).  BP completed using cuff size: large    Questioned patient about current smoking habits.  Pt. has never smoked.                   "

## 2022-03-01 NOTE — PROGRESS NOTES
Seen with [employer] .   S: Feels well,  Baby active.  Denies uterine cramping, vaginal bleeding or leaking of fluid. She has not been here in clinic since December. States she has been in town. Did have ultrasound on 2/23. Has a ultrasound follow up scheduled already for March. She has been taking PNV.   She has two children at home are 3 years and 9 nine months.  She has hx of 3rd degree and is agreeable to consult with MD. She denies any concerns today.   O: Vitals: LMP 08/19/2021   BMI= Body mass index is 38.24 kg/m .  Exam:  Constitutional: healthy, alert and no distress  Respiratory: respirations even and unlabored  Gastrointestinal: Abdomen soft, non-tender. Fundus measures appropriate for gestational age. Fetal heart tones hear without difficulty and within normal limits  : Deferred  Psychiatric: mentation appears normal and affect normal/bright  A:     ICD-10-CM    1. Supervision of high risk pregnancy in third trimester  O09.93 Glucose tolerance, gest screen, 1 hour     CBC with platelets     Treponema Abs w Reflex to RPR and Titer     TDAP VACCINE (Adacel, Boostrix)  [[id number]]   2. History of third degree perineal laceration  Z87.59    3. Insufficient prenatal care in third trimester  O09.33      P: GCT/repeat RPR today, handout provided.   Tdap given; reviewed CDC recommendations and partner/family vaccination recommended as well.  Need for Rhogam? No.   Encouraged patient to call with any questions or concerns.  Return to clinic 2 weeks for MD consult.   Encouraged routine visits.   Lottie Eisenberg CNM

## 2022-03-02 DIAGNOSIS — O99.013 ANEMIA OF MOTHER IN PREGNANCY, ANTEPARTUM, THIRD TRIMESTER: Primary | ICD-10-CM

## 2022-03-02 DIAGNOSIS — O09.93 SUPERVISION OF HIGH RISK PREGNANCY IN THIRD TRIMESTER: ICD-10-CM

## 2022-03-02 PROBLEM — O99.012 ANEMIA AFFECTING PREGNANCY IN SECOND TRIMESTER: Status: ACTIVE | Noted: 2021-12-15

## 2022-03-02 PROBLEM — R73.09 ABNORMAL GLUCOSE TOLERANCE TEST: Status: ACTIVE | Noted: 2022-03-02

## 2022-03-02 LAB — T PALLIDUM AB SER QL: NONREACTIVE

## 2022-03-02 RX ORDER — HEPARIN SODIUM,PORCINE 10 UNIT/ML
5 VIAL (ML) INTRAVENOUS
Status: CANCELLED | OUTPATIENT
Start: 2022-03-02

## 2022-03-02 RX ORDER — ALBUTEROL SULFATE 90 UG/1
1-2 AEROSOL, METERED RESPIRATORY (INHALATION)
Status: CANCELLED
Start: 2022-03-02

## 2022-03-02 RX ORDER — METHYLPREDNISOLONE SODIUM SUCCINATE 125 MG/2ML
125 INJECTION, POWDER, LYOPHILIZED, FOR SOLUTION INTRAMUSCULAR; INTRAVENOUS
Status: CANCELLED
Start: 2022-03-02

## 2022-03-02 RX ORDER — NALOXONE HYDROCHLORIDE 0.4 MG/ML
0.2 INJECTION, SOLUTION INTRAMUSCULAR; INTRAVENOUS; SUBCUTANEOUS
Status: CANCELLED | OUTPATIENT
Start: 2022-03-02

## 2022-03-02 RX ORDER — EPINEPHRINE 1 MG/ML
0.3 INJECTION, SOLUTION, CONCENTRATE INTRAVENOUS EVERY 5 MIN PRN
Status: CANCELLED | OUTPATIENT
Start: 2022-03-02

## 2022-03-02 RX ORDER — DIPHENHYDRAMINE HYDROCHLORIDE 50 MG/ML
50 INJECTION INTRAMUSCULAR; INTRAVENOUS
Status: CANCELLED
Start: 2022-03-02

## 2022-03-02 RX ORDER — ALBUTEROL SULFATE 0.83 MG/ML
2.5 SOLUTION RESPIRATORY (INHALATION)
Status: CANCELLED | OUTPATIENT
Start: 2022-03-02

## 2022-03-02 RX ORDER — HEPARIN SODIUM (PORCINE) LOCK FLUSH IV SOLN 100 UNIT/ML 100 UNIT/ML
5 SOLUTION INTRAVENOUS
Status: CANCELLED | OUTPATIENT
Start: 2022-03-02

## 2022-03-02 RX ORDER — MEPERIDINE HYDROCHLORIDE 25 MG/ML
25 INJECTION INTRAMUSCULAR; INTRAVENOUS; SUBCUTANEOUS EVERY 30 MIN PRN
Status: CANCELLED | OUTPATIENT
Start: 2022-03-02

## 2022-03-07 ENCOUNTER — DOCUMENTATION ONLY (OUTPATIENT)
Dept: LAB | Facility: CLINIC | Age: 27
End: 2022-03-07
Payer: COMMERCIAL

## 2022-03-07 DIAGNOSIS — O99.810 ABNORMAL MATERNAL GLUCOSE TOLERANCE, ANTEPARTUM: Primary | ICD-10-CM

## 2022-03-07 NOTE — PROGRESS NOTES
Please place or confirm 3 hour glucose orders for upcoming lab appointment on 3/14/2022 Thank You.

## 2022-03-30 ENCOUNTER — HOSPITAL ENCOUNTER (OUTPATIENT)
Dept: ULTRASOUND IMAGING | Facility: CLINIC | Age: 27
Discharge: HOME | End: 2022-03-30
Attending: OBSTETRICS & GYNECOLOGY
Payer: COMMERCIAL

## 2022-03-30 ENCOUNTER — TELEPHONE (OUTPATIENT)
Dept: OBGYN | Facility: CLINIC | Age: 27
End: 2022-03-30

## 2022-03-30 ENCOUNTER — OFFICE VISIT (OUTPATIENT)
Dept: MATERNAL FETAL MEDICINE | Facility: CLINIC | Age: 27
End: 2022-03-30
Attending: OBSTETRICS & GYNECOLOGY
Payer: COMMERCIAL

## 2022-03-30 DIAGNOSIS — Z36.89 ENCOUNTER FOR ULTRASOUND TO CHECK FETAL GROWTH: Primary | ICD-10-CM

## 2022-03-30 DIAGNOSIS — Z36.89 ENCOUNTER FOR ULTRASOUND TO CHECK FETAL GROWTH: ICD-10-CM

## 2022-03-30 PROCEDURE — 76816 OB US FOLLOW-UP PER FETUS: CPT

## 2022-03-30 PROCEDURE — 76816 OB US FOLLOW-UP PER FETUS: CPT | Mod: 26 | Performed by: OBSTETRICS & GYNECOLOGY

## 2022-03-30 NOTE — PROGRESS NOTES
"Please see \"Imaging\" tab under \"Chart Review\" for details of today's US.    Shannan Chavez, DO    "

## 2022-03-31 NOTE — TELEPHONE ENCOUNTER
Can you please call pt to schedule a midwife appointment as soon as she can? She hasn't been seen in a month, and has also not followed up for her 3 hour glucose test.     Thank you!  LAXMI Tamez, HALINA

## 2022-04-01 NOTE — TELEPHONE ENCOUNTER
Tried calling with  services and wasn't able to get through to an . Will try again later or on Monday.

## 2022-04-11 ENCOUNTER — PRENATAL OFFICE VISIT (OUTPATIENT)
Dept: MIDWIFE SERVICES | Facility: CLINIC | Age: 27
End: 2022-04-11
Payer: COMMERCIAL

## 2022-04-11 VITALS — SYSTOLIC BLOOD PRESSURE: 94 MMHG | DIASTOLIC BLOOD PRESSURE: 68 MMHG | WEIGHT: 229.1 LBS | BODY MASS INDEX: 38.12 KG/M2

## 2022-04-11 DIAGNOSIS — O09.33 INSUFFICIENT PRENATAL CARE IN THIRD TRIMESTER: ICD-10-CM

## 2022-04-11 DIAGNOSIS — O09.93 SUPERVISION OF HIGH RISK PREGNANCY IN THIRD TRIMESTER: Primary | ICD-10-CM

## 2022-04-11 DIAGNOSIS — R73.09 ELEVATED GLUCOSE: ICD-10-CM

## 2022-04-11 DIAGNOSIS — O99.013 ANEMIA OF MOTHER IN PREGNANCY, ANTEPARTUM, THIRD TRIMESTER: ICD-10-CM

## 2022-04-11 PROCEDURE — 99207 PR PRENATAL VISIT: CPT | Performed by: REGISTERED NURSE

## 2022-04-11 RX ORDER — NALOXONE HYDROCHLORIDE 0.4 MG/ML
0.2 INJECTION, SOLUTION INTRAMUSCULAR; INTRAVENOUS; SUBCUTANEOUS
Status: CANCELLED | OUTPATIENT
Start: 2022-04-11

## 2022-04-11 RX ORDER — ALBUTEROL SULFATE 90 UG/1
1-2 AEROSOL, METERED RESPIRATORY (INHALATION)
Status: CANCELLED
Start: 2022-04-11

## 2022-04-11 RX ORDER — METHYLPREDNISOLONE SODIUM SUCCINATE 125 MG/2ML
125 INJECTION, POWDER, LYOPHILIZED, FOR SOLUTION INTRAMUSCULAR; INTRAVENOUS
Status: CANCELLED
Start: 2022-04-11

## 2022-04-11 RX ORDER — DIPHENHYDRAMINE HYDROCHLORIDE 50 MG/ML
50 INJECTION INTRAMUSCULAR; INTRAVENOUS
Status: CANCELLED
Start: 2022-04-11

## 2022-04-11 RX ORDER — EPINEPHRINE 1 MG/ML
0.3 INJECTION, SOLUTION, CONCENTRATE INTRAVENOUS EVERY 5 MIN PRN
Status: CANCELLED | OUTPATIENT
Start: 2022-04-11

## 2022-04-11 RX ORDER — MEPERIDINE HYDROCHLORIDE 25 MG/ML
25 INJECTION INTRAMUSCULAR; INTRAVENOUS; SUBCUTANEOUS EVERY 30 MIN PRN
Status: CANCELLED | OUTPATIENT
Start: 2022-04-11

## 2022-04-11 RX ORDER — ALBUTEROL SULFATE 0.83 MG/ML
2.5 SOLUTION RESPIRATORY (INHALATION)
Status: CANCELLED | OUTPATIENT
Start: 2022-04-11

## 2022-04-11 RX ORDER — HEPARIN SODIUM (PORCINE) LOCK FLUSH IV SOLN 100 UNIT/ML 100 UNIT/ML
5 SOLUTION INTRAVENOUS
Status: CANCELLED | OUTPATIENT
Start: 2022-04-11

## 2022-04-11 RX ORDER — HEPARIN SODIUM,PORCINE 10 UNIT/ML
5 VIAL (ML) INTRAVENOUS
Status: CANCELLED | OUTPATIENT
Start: 2022-04-11

## 2022-04-11 NOTE — PROGRESS NOTES
Has not been seen since 3/1/22, after being absent from care since December. Did go to [facility] US on 3/30/22. Has not followed up on for 3hr GTT due to elevated 1 hr at 154. Hgb at this 28 week visit 8.4. Did not go to IV infusion appointments. History of 3rd degree x2, has been offered OB consult but this has not taken place.     S: Feels good overall,  Baby active.  Denies uterine cramping, vaginal bleeding or leaking of fluid.     O: Vitals: BP 94/68 (BP Location: Right arm, Cuff Size: Adult Regular)   Wt 103.9 kg (229 lb 1.6 oz)   LMP 08/19/2021   Breastfeeding No   BMI 38.12 kg/m    BMI= Body mass index is 38.12 kg/m .  Exam:  Constitutional: healthy, alert and no distress  Respiratory: respirations even and unlabored  Gastrointestinal: Abdomen soft, non-tender. Fundus measures appropriate for gestational age. Fetal heart tones hear without difficulty and within normal limits  : Deferred  Psychiatric: mentation appears normal and affect normal/bright  A:     ICD-10-CM    1. Supervision of high risk pregnancy in third trimester  O09.93    2. Anemia of mother in pregnancy, antepartum, third trimester  O99.013 Asymptomatic COVID-19 Virus (Coronavirus) by PCR   3. Elevated glucose  R73.09     elevated 1hr, GTT not yet done     4. Insufficient prenatal care in third trimester  O09.33      P: Discussed GBS and screen for next visit. Did not want hgb today, open to at next visit. Had long conversation about importance consistent prenatal care, following up for IV infusion, and need for 3hr GTT. Discussed that she is considered higher risk given her low hgb, unknown gestational diabetes status, and inconsistent prenatal care. Changed IV iron infusion to dextran instead of venofer to make it easier for patient to go to appointment. Had patient schedule 3hr GTT and next prenatal appointment before leaving. Infusion therapy to call patient tomorrow or next day to schedule Dextran. Discussed that the midwife team may  need to consider transfer to MD team if she is unable to make these appointments within the next week.   US on 3/30/22 showed EFW of 57%. Review need for additional growth US at next visit based on fundal height and 3 hr GTT result.   Encouraged patient to call with any questions or concerns.    Return to clinic 1 weeks      LAXMI Payan CNM

## 2022-04-11 NOTE — NURSING NOTE
"Chief Complaint   Patient presents with     Prenatal Care     35w 3d, stopped taking all medication, did not give a reason. Reports daily fetal movement       Initial BP 94/68 (BP Location: Right arm, Cuff Size: Adult Regular)   Wt 103.9 kg (229 lb 1.6 oz)   LMP 2021   Breastfeeding No   BMI 38.12 kg/m   Estimated body mass index is 38.12 kg/m  as calculated from the following:    Height as of 12/15/21: 1.651 m (5' 5\").    Weight as of this encounter: 103.9 kg (229 lb 1.6 oz).  BP completed using cuff size: regular    Questioned patient about current smoking habits.  Pt. has never smoked.                 "
Dr. Alas

## 2022-04-18 NOTE — PROGRESS NOTES
"Seen with [employer] video .   S: Feels well.   Baby active. Unable to complete 3 hr gtt due to weather.  Denies uterine cramping, vaginal bleeding or leaking of fluid. No headache, increase in edema, no epigastric pain. Thought she had an ultrasound today. Only wants to know if baby is ok. Declined GBS.\"Maybe next visit.\"  O: Vitals: LMP 08/19/2021   BMI= Body mass index is 38.44 kg/m .  Exam:  Constitutional: healthy, alert and no distress  Respiratory: respirations even and unlabored  Gastrointestinal: Abdomen soft, non-tender. Fundus measures appropriate for gestational age. Fetal heart tones hear without difficulty and within normal limits  : Deferred  Psychiatric: mentation appears normal and affect normal/bright  A:     ICD-10-CM    1. Supervision of high risk pregnancy in third trimester  O09.93 Group B strep PCR     CBC with platelets   2. Anemia of mother in pregnancy, antepartum, third trimester  O99.013 CBC with platelets   3. Insufficient prenatal care in third trimester  O09.33      P:  Transfer to MD. Discussed rational with Karen.   Labor signs and symptoms discussed, aware of numbers to call  Discussed warning signs of PIH/preeclampsia and patient will monitor.  GBS screen NOT completed.  Growth sono ordered.    Encouraged patient to call with any questions or concerns.  Return to clinic 1 weeks  Lottie Eisenberg CNM                     "

## 2022-04-19 ENCOUNTER — PRENATAL OFFICE VISIT (OUTPATIENT)
Dept: MIDWIFE SERVICES | Facility: CLINIC | Age: 27
End: 2022-04-19
Payer: COMMERCIAL

## 2022-04-19 VITALS — SYSTOLIC BLOOD PRESSURE: 90 MMHG | WEIGHT: 231 LBS | BODY MASS INDEX: 38.44 KG/M2 | DIASTOLIC BLOOD PRESSURE: 56 MMHG

## 2022-04-19 DIAGNOSIS — O09.93 SUPERVISION OF HIGH RISK PREGNANCY IN THIRD TRIMESTER: Primary | ICD-10-CM

## 2022-04-19 DIAGNOSIS — O99.013 ANEMIA OF MOTHER IN PREGNANCY, ANTEPARTUM, THIRD TRIMESTER: ICD-10-CM

## 2022-04-19 DIAGNOSIS — O09.33 INSUFFICIENT PRENATAL CARE IN THIRD TRIMESTER: ICD-10-CM

## 2022-04-19 PROCEDURE — 99207 PR PRENATAL VISIT: CPT | Performed by: ADVANCED PRACTICE MIDWIFE

## 2022-04-19 NOTE — NURSING NOTE
"Chief Complaint   Patient presents with     Prenatal Care     36w 4d due for GBS and recheck hgb-declines     Pt has stopped all medication and vitamins. States that she did not show up for 3 hr GTT yesterday due to snow.     Initial BP 90/56 (BP Location: Left arm, Cuff Size: Adult Large)   Wt 104.8 kg (231 lb)   LMP 2021   BMI 38.44 kg/m   Estimated body mass index is 38.44 kg/m  as calculated from the following:    Height as of 12/15/21: 1.651 m (5' 5\").    Weight as of this encounter: 104.8 kg (231 lb).  BP completed using cuff size: large    Questioned patient about current smoking habits.  Pt. has never smoked.                "

## 2022-04-22 ENCOUNTER — ANCILLARY PROCEDURE (OUTPATIENT)
Dept: ULTRASOUND IMAGING | Facility: CLINIC | Age: 27
End: 2022-04-22
Attending: ADVANCED PRACTICE MIDWIFE
Payer: COMMERCIAL

## 2022-04-22 DIAGNOSIS — O09.33 INSUFFICIENT PRENATAL CARE IN THIRD TRIMESTER: ICD-10-CM

## 2022-04-22 DIAGNOSIS — O09.93 SUPERVISION OF HIGH RISK PREGNANCY IN THIRD TRIMESTER: ICD-10-CM

## 2022-04-22 PROCEDURE — 76816 OB US FOLLOW-UP PER FETUS: CPT | Performed by: OBSTETRICS & GYNECOLOGY

## 2022-04-22 PROCEDURE — 76819 FETAL BIOPHYS PROFIL W/O NST: CPT | Performed by: OBSTETRICS & GYNECOLOGY

## 2022-04-25 ENCOUNTER — LAB (OUTPATIENT)
Dept: LAB | Facility: CLINIC | Age: 27
End: 2022-04-25
Payer: COMMERCIAL

## 2022-04-25 DIAGNOSIS — O99.810 ABNORMAL MATERNAL GLUCOSE TOLERANCE, ANTEPARTUM: ICD-10-CM

## 2022-04-25 LAB
GESTATIONAL GTT 1 HR POST DOSE: 138 MG/DL (ref 60–179)
GESTATIONAL GTT 2 HR POST DOSE: 121 MG/DL (ref 60–154)
GESTATIONAL GTT 3 HR POST DOSE: 89 MG/DL (ref 60–139)
GLUCOSE P FAST SERPL-MCNC: 81 MG/DL (ref 60–94)

## 2022-04-25 PROCEDURE — 36415 COLL VENOUS BLD VENIPUNCTURE: CPT

## 2022-04-25 PROCEDURE — 82952 GTT-ADDED SAMPLES: CPT

## 2022-04-25 PROCEDURE — 82951 GLUCOSE TOLERANCE TEST (GTT): CPT

## 2022-04-27 ENCOUNTER — TRANSCRIBE ORDERS (OUTPATIENT)
Dept: MATERNAL FETAL MEDICINE | Facility: CLINIC | Age: 27
End: 2022-04-27
Payer: COMMERCIAL

## 2022-04-27 DIAGNOSIS — O26.90 PREGNANCY RELATED CONDITION, ANTEPARTUM: Primary | ICD-10-CM

## 2022-04-27 DIAGNOSIS — Z34.80 SUPERVISION OF OTHER NORMAL PREGNANCY, ANTEPARTUM: Primary | ICD-10-CM

## 2022-04-29 ENCOUNTER — PRENATAL OFFICE VISIT (OUTPATIENT)
Dept: OBGYN | Facility: CLINIC | Age: 27
End: 2022-04-29
Payer: COMMERCIAL

## 2022-04-29 VITALS — SYSTOLIC BLOOD PRESSURE: 92 MMHG | DIASTOLIC BLOOD PRESSURE: 60 MMHG | BODY MASS INDEX: 36.78 KG/M2 | WEIGHT: 221 LBS

## 2022-04-29 DIAGNOSIS — O99.213 OBESITY AFFECTING PREGNANCY IN THIRD TRIMESTER: ICD-10-CM

## 2022-04-29 DIAGNOSIS — Z87.59 HISTORY OF THIRD DEGREE PERINEAL LACERATION: ICD-10-CM

## 2022-04-29 DIAGNOSIS — O09.299 HISTORY OF PRE-ECLAMPSIA IN PRIOR PREGNANCY, CURRENTLY PREGNANT: ICD-10-CM

## 2022-04-29 DIAGNOSIS — O09.93 SUPERVISION OF HIGH RISK PREGNANCY IN THIRD TRIMESTER: ICD-10-CM

## 2022-04-29 DIAGNOSIS — O99.013 ANEMIA OF MOTHER IN PREGNANCY, ANTEPARTUM, THIRD TRIMESTER: Primary | ICD-10-CM

## 2022-04-29 PROBLEM — R73.09 ABNORMAL GLUCOSE TOLERANCE TEST: Status: RESOLVED | Noted: 2022-03-02 | Resolved: 2022-04-29

## 2022-04-29 PROBLEM — Z34.82 ENCOUNTER FOR SUPERVISION OF OTHER NORMAL PREGNANCY IN SECOND TRIMESTER: Status: RESOLVED | Noted: 2021-12-15 | Resolved: 2022-04-29

## 2022-04-29 PROCEDURE — 99207 PR COMPLICATED OB VISIT: CPT | Performed by: OBSTETRICS & GYNECOLOGY

## 2022-04-29 NOTE — NURSING NOTE
"Chief Complaint   Patient presents with     Prenatal Care     38 weeks- no concerns        Initial BP 92/60 (BP Location: Right arm, Patient Position: Sitting, Cuff Size: Adult Large)   Wt 100.2 kg (221 lb)   LMP 2021   BMI 36.78 kg/m   Estimated body mass index is 36.78 kg/m  as calculated from the following:    Height as of 12/15/21: 1.651 m (5' 5\").    Weight as of this encounter: 100.2 kg (221 lb).  BP completed using cuff size: large    Questioned patient about current smoking habits.  Pt. has never smoked.          The following HM Due: NONE    38w0d  Duane Smith CMA              "

## 2022-04-29 NOTE — PROGRESS NOTES
I communicated with Pt via [employer]  because Pt speaks no/limited English.    ROEL from [facility] service due to Hx 3rd degree Perineal laceration x 2 last 2 deliveries, and concern for GDM as she had an elevated 1hr glucose, and did not get her 3hr GTT done until 4 days ago, but this turned out normal.  Also U/S 37 wk showed EFW 29%, AC 6% so there was concern that this may represent early IUGR.  Has [facility] U/S 5/4/2022 to recheck this.  Also has been anemic Hgb 8.4 and on po Fe.  Has had IV Fe ordered in past but never complied with recommendation to get this Rx.  Also, Pt has not wanted GBS done, but after I explained why she is willing, but only if a female OB is the one doing it.  I advised Pt that she has >50% chance of getting a male OB deliver.  Fetal movement counts BID, rupture of membranes/labor precautions reviewed.  RTC 1 week(s) w/ CNM as I do feel as long as the [facility] U/S next week does not show any severe IUGR, then she would be appropriate to return to [facility]'s for remaining care and delivery.  Her risk of another 3rd deg lac is low as this is her 3rd baby and by U/S looks smaller than the first two.  She strongly desires a female provider to deliver her, and this will provide that.  I also d/w Betsy Jaramillo [facility] who concurs w/ plan.  Fetal movement counts BID,labor/rupture of membranes precautions reviewed.  Needs GBS done at next visit, and I believe I was able to get Pt to understand rational for this.  Of note, she was GBS Pos last preg, so if she were to decline GBS Cx before she goes into labor, recommend collecting GBS on L&D when in labor or at start of induction, and consider PCN prophylaxis until GBS comes back.    Encounter Diagnoses   Name Primary?     Anemia of mother in pregnancy, antepartum, third trimester Yes     Obesity affecting pregnancy in third trimester      History of third degree perineal laceration      Supervision of high risk pregnancy in third trimester      History of  pre-eclampsia in prior pregnancy, currently pregnant        Risk factors listed above are stable and being addressed as noted.    MD SHAYLA [facility]        Encounter Diagnoses   Name Primary?     Anemia of mother in pregnancy, antepartum, third trimester Yes     Obesity affecting pregnancy in third trimester      History of third degree perineal laceration      Supervision of high risk pregnancy in third trimester      History of pre-eclampsia in prior pregnancy, currently pregnant        Risk factors listed above are stable and being addressed as noted.    MD SHAYLA [facility]

## 2022-05-02 NOTE — PROGRESS NOTES
Midwives had transferred this patient to [facility] for declining inconsistent prenatal care, unknown GDM status- declining 3hr GTT, hgb of 8.4 and declining IV iron, and IUGR status. Saw Dr. Hummel and he ok'd transfer back to [facility]. MD consultant during labor/birth if needed. Patient did have 3hr GTT and normal results. Has follow-up US scheduled for IUGR today. Is concerned about possibility of male provider delivering baby if with the MD team.     S: Feels good today. Declined  today.  Baby really active, harder to sleep at night. Feels like baby will come near the due date.  Denies uterine cramping, vaginal bleeding or leaking of fluid. No headache, increase in edema, no epigastric pain.     Ok with doing GBS today. Declines blood draw. Has appointment with [facility] for growth this afternoon for IUGR.     O: Vitals: BP 90/60 (BP Location: Right arm, Patient Position: Chair, Cuff Size: Adult Large)   Wt 104.8 kg (231 lb)   LMP 08/19/2021   Breastfeeding No   BMI 38.44 kg/m    BMI= Body mass index is 38.44 kg/m .  Exam:  Constitutional: healthy, alert and no distress  Respiratory: respirations even and unlabored  Gastrointestinal: Abdomen soft, non-tender. Fundus measures appropriate for gestational age. Fetal heart tones hear without difficulty and within normal limits  : Normal external genitalia without lesions  Psychiatric: mentation appears normal and affect normal/bright  A:     ICD-10-CM    1. Supervision of high risk pregnancy in third trimester  O09.93 Group B strep PCR   2. Anemia of mother in pregnancy, antepartum, third trimester  O99.013    3. History of third degree perineal laceration  Z87.59    4. Poor fetal growth affecting management of mother in third trimester, single or unspecified fetus  O36.5930      P: Labor signs and symptoms discussed, aware of numbers to call  Discussed warning signs of PIH/preeclampsia and patient will monitor.  GBS screen completed and pending.  Await for results  of growth with MFM and recommendations.  Encouraged patient to call with any questions or concerns.  Return to clinic 1 weeks    LAXMI PayanM

## 2022-05-02 NOTE — PATIENT INSTRUCTIONS
"Labor Instructions for Midwife Patients    When to call:  Both during and after office hours call [phone]. There is a Nurse Midwife available to take your calls and answer your questions 24 hours a day.     When to call:  Call anytime you have important concerns about you or your baby.     Call if:  You are having contractions at regular intervals about 5-6 minutes apart lasting 30-60 seconds and becoming increasingly more intense   You have an uncontrollable gush of fluid from your vagina or feel a pop and gush like your water has broken  You have HEAVY bleeding, like heavy period, blood running down your legs, or  soaking a pad.   Some bleeding after a pelvic exam, after intercourse, or in labor when your cervix is dilating is normal and is referred to as \"bloody show\"  You have severe, continuous back or abdominal pain  You feel it is time to go to the hospital  If this is your first labor, call when contractions are very intense and have been about every 3-4 minutes for about an hour  If it is your second labor or more, call when contractions are strong and about every 3-5 minutes or sooner depending on your level of discomfort.     Keep in mind we are always here for you! If you have questions, concerns please don't hesitate to call us.     What to eat/drink in labor: Drink plenty of fluid (water most importantly, juice, soda or tea without caffeine). Eat rice, pasta, soup, cereal, bread/toast, and fruit. Avoid dairy and greasy food as they are difficult to digest and you may experience some nausea during labor.    Comfort measures:  Baths and showers (ok even with ruptured membranes, it may temporarily slow contractions if you are still in the early stage of labor)  Warm/hot packs for back pain or discomfort  Back, belly, or thigh massages  Standing, rocking, walking, leaning over bed or tables, side-lying and sleeping    Miscellaneous:   Contractions are timed from the beginning of one to the beginning " of the next  Try hard to sleep during the early stage of labor when you are not that uncomfortable. Timing of contractions at this point is not important  Even if you cannot sleep, resting in bed or on the couch can help you maintain your energy for labor  When you arrive at the hospital the nurse will check your baby's heartbeat, check your cervix, and will call us. The midwife on call will come in and be with you when you are in active labor  After hours you need to enter the hospital through the emergency room

## 2022-05-04 ENCOUNTER — PRENATAL OFFICE VISIT (OUTPATIENT)
Dept: MIDWIFE SERVICES | Facility: CLINIC | Age: 27
End: 2022-05-04
Payer: COMMERCIAL

## 2022-05-04 ENCOUNTER — OFFICE VISIT (OUTPATIENT)
Dept: MATERNAL FETAL MEDICINE | Facility: CLINIC | Age: 27
End: 2022-05-04
Attending: OBSTETRICS & GYNECOLOGY
Payer: COMMERCIAL

## 2022-05-04 ENCOUNTER — HOSPITAL ENCOUNTER (OUTPATIENT)
Dept: ULTRASOUND IMAGING | Facility: CLINIC | Age: 27
Discharge: HOME | End: 2022-05-04
Attending: OBSTETRICS & GYNECOLOGY
Payer: COMMERCIAL

## 2022-05-04 VITALS — WEIGHT: 231 LBS | DIASTOLIC BLOOD PRESSURE: 60 MMHG | BODY MASS INDEX: 38.44 KG/M2 | SYSTOLIC BLOOD PRESSURE: 90 MMHG

## 2022-05-04 DIAGNOSIS — O99.013 ANEMIA OF MOTHER IN PREGNANCY, ANTEPARTUM, THIRD TRIMESTER: ICD-10-CM

## 2022-05-04 DIAGNOSIS — Z87.59 HISTORY OF THIRD DEGREE PERINEAL LACERATION: ICD-10-CM

## 2022-05-04 DIAGNOSIS — O36.5930 MATERNAL CARE FOR OTHER KNOWN OR SUSPECTED POOR FETAL GROWTH, THIRD TRIMESTER, NOT APPLICABLE OR UNSPECIFIED: Primary | ICD-10-CM

## 2022-05-04 DIAGNOSIS — O36.5930 POOR FETAL GROWTH AFFECTING MANAGEMENT OF MOTHER IN THIRD TRIMESTER, SINGLE OR UNSPECIFIED FETUS: ICD-10-CM

## 2022-05-04 DIAGNOSIS — O09.93 SUPERVISION OF HIGH RISK PREGNANCY IN THIRD TRIMESTER: Primary | ICD-10-CM

## 2022-05-04 DIAGNOSIS — O26.90 PREGNANCY RELATED CONDITION, ANTEPARTUM: ICD-10-CM

## 2022-05-04 PROCEDURE — 76816 OB US FOLLOW-UP PER FETUS: CPT | Mod: 26 | Performed by: OBSTETRICS & GYNECOLOGY

## 2022-05-04 PROCEDURE — 99207 PR PRENATAL VISIT: CPT | Performed by: REGISTERED NURSE

## 2022-05-04 PROCEDURE — 76816 OB US FOLLOW-UP PER FETUS: CPT

## 2022-05-04 PROCEDURE — 87653 STREP B DNA AMP PROBE: CPT | Performed by: REGISTERED NURSE

## 2022-05-04 NOTE — PROGRESS NOTES
"Please see \"Imaging\" tab under \"Chart Review\" for details of today's visit.    Dolores Solano    "

## 2022-05-04 NOTE — NURSING NOTE
"Chief Complaint   Patient presents with     Prenatal Care     38 5/7 weeks       Initial BP 90/60 (BP Location: Right arm, Patient Position: Chair, Cuff Size: Adult Large)   Wt 104.8 kg (231 lb)   LMP 2021   Breastfeeding No   BMI 38.44 kg/m   Estimated body mass index is 38.44 kg/m  as calculated from the following:    Height as of 12/15/21: 1.651 m (5' 5\").    Weight as of this encounter: 104.8 kg (231 lb).  BP completed using cuff size: large    Questioned patient about current smoking habits.  Pt. has never smoked.          The following HM Due: GERMÁN Diallo, CHRISTINA               "

## 2022-05-05 LAB — GP B STREP DNA SPEC QL NAA+PROBE: NEGATIVE

## 2022-05-11 ENCOUNTER — PRENATAL OFFICE VISIT (OUTPATIENT)
Dept: MIDWIFE SERVICES | Facility: CLINIC | Age: 27
End: 2022-05-11
Payer: COMMERCIAL

## 2022-05-11 VITALS — BODY MASS INDEX: 38.61 KG/M2 | SYSTOLIC BLOOD PRESSURE: 100 MMHG | DIASTOLIC BLOOD PRESSURE: 62 MMHG | WEIGHT: 232 LBS

## 2022-05-11 DIAGNOSIS — O09.93 SUPERVISION OF HIGH RISK PREGNANCY IN THIRD TRIMESTER: Primary | ICD-10-CM

## 2022-05-11 PROCEDURE — 99207 PR PRENATAL VISIT: CPT | Performed by: ADVANCED PRACTICE MIDWIFE

## 2022-05-11 NOTE — PATIENT INSTRUCTIONS
"Labor Instructions for Midwife Patients    When to call:  Both during and after office hours call [phone]. There is a Nurse Midwife available to take your calls and answer your questions 24 hours a day.     When to call:  Call anytime you have important concerns about you or your baby.     Call if:  You are having contractions at regular intervals about 5-6 minutes apart lasting 30-60 seconds and becoming increasingly more intense   You have an uncontrollable gush of fluid from your vagina or feel a pop and gush like your water has broken  You have HEAVY bleeding, like heavy period, blood running down your legs, or  soaking a pad.   Some bleeding after a pelvic exam, after intercourse, or in labor when your cervix is dilating is normal and is referred to as \"bloody show\"  You have severe, continuous back or abdominal pain  You feel it is time to go to the hospital  If this is your first labor, call when contractions are very intense and have been about every 3-4 minutes for about an hour  If it is your second labor or more, call when contractions are strong and about every 3-5 minutes or sooner depending on your level of discomfort.     Keep in mind we are always here for you! If you have questions, concerns please don't hesitate to call us.     What to eat/drink in labor: Drink plenty of fluid (water most importantly, juice, soda or tea without caffeine). Eat rice, pasta, soup, cereal, bread/toast, and fruit. Avoid dairy and greasy food as they are difficult to digest and you may experience some nausea during labor.    Comfort measures:  Baths and showers (ok even with ruptured membranes, it may temporarily slow contractions if you are still in the early stage of labor)  Warm/hot packs for back pain or discomfort  Back, belly, or thigh massages  Standing, rocking, walking, leaning over bed or tables, side-lying and sleeping    Miscellaneous:   Contractions are timed from the beginning of one to the beginning " of the next  Try hard to sleep during the early stage of labor when you are not that uncomfortable. Timing of contractions at this point is not important  Even if you cannot sleep, resting in bed or on the couch can help you maintain your energy for labor  When you arrive at the hospital the nurse will check your baby's heartbeat, check your cervix, and will call us. The midwife on call will come in and be with you when you are in active labor  After hours you need to enter the hospital through the emergency room

## 2022-05-11 NOTE — PROGRESS NOTES
Patient is here today with an  via ipad  S: Feels ready to have this baby.  Baby active.  Denies uterine cramping, vaginal bleeding or leaking of fluid. No headache, increase in edema, no epigastric pain.   O: Vitals: /62 (BP Location: Right arm, Cuff Size: Adult Large)   Wt 105.2 kg (232 lb)   LMP 08/19/2021   BMI 38.61 kg/m    BMI= Body mass index is 38.61 kg/m .  Exam:  Constitutional: healthy, alert and no distress  Respiratory: respirations even and unlabored  Gastrointestinal: Abdomen soft, non-tender. Fundus measures appropriate for gestational age. Fetal heart tones hear without difficulty and within normal limits  : Normal external genitalia without lesions  Psychiatric: mentation appears normal and affect normal/bright  A:     ICD-10-CM    1. Supervision of high risk pregnancy in third trimester  O09.93      P: Labor signs and symptoms discussed, aware of numbers to call.  Discussed warning signs of PIH/preeclampsia and patient will monitor.  GBS screen completed. Discussed plans for labor.   Discussed postdates options.  Encouraged patient to call with any questions or concerns.  Return to clinic 1 weeks    LAXMI aSnchez, HALINA

## 2022-05-17 ENCOUNTER — PRENATAL OFFICE VISIT (OUTPATIENT)
Dept: MIDWIFE SERVICES | Facility: CLINIC | Age: 27
End: 2022-05-17
Payer: COMMERCIAL

## 2022-05-17 VITALS — DIASTOLIC BLOOD PRESSURE: 60 MMHG | BODY MASS INDEX: 38.27 KG/M2 | WEIGHT: 230 LBS | SYSTOLIC BLOOD PRESSURE: 108 MMHG

## 2022-05-17 DIAGNOSIS — Z87.59 HISTORY OF THIRD DEGREE PERINEAL LACERATION: ICD-10-CM

## 2022-05-17 DIAGNOSIS — O09.93 SUPERVISION OF HIGH RISK PREGNANCY IN THIRD TRIMESTER: Primary | ICD-10-CM

## 2022-05-17 DIAGNOSIS — O99.013 ANEMIA OF MOTHER IN PREGNANCY, ANTEPARTUM, THIRD TRIMESTER: ICD-10-CM

## 2022-05-17 DIAGNOSIS — O48.0 POST-TERM PREGNANCY, 40-42 WEEKS OF GESTATION: ICD-10-CM

## 2022-05-17 DIAGNOSIS — O09.33 INSUFFICIENT PRENATAL CARE IN THIRD TRIMESTER: ICD-10-CM

## 2022-05-17 PROCEDURE — 99207 PR PRENATAL VISIT: CPT | Performed by: ADVANCED PRACTICE MIDWIFE

## 2022-05-17 NOTE — PROGRESS NOTES
Declined formal . Partner acting as .   S: Feels well,  Baby active.  Denies uterine cramping, vaginal bleeding or leaking of fluid. No headache, increase in edema, no epigastric pain.  Had ultrasound on 5/6 with EFW WNL.  Denies any contractions.   O: Vitals: /60 (BP Location: Right arm, Cuff Size: Adult Large)   Wt 104.3 kg (230 lb)   LMP 08/19/2021   BMI 38.27 kg/m    BMI= There is no height or weight on file to calculate BMI.  Exam:  Constitutional: healthy, alert and no distress  Respiratory: respirations even and unlabored  Gastrointestinal: Abdomen soft, non-tender. Fundus measures appropriate for gestational age. Fetal heart tones hear without difficulty and within normal limits  : Normal external genitalia without lesions  Psychiatric: mentation appears normal and affect normal/bright  A:     ICD-10-CM    1. Supervision of high risk pregnancy in third trimester  O09.93    2. Anemia of mother in pregnancy, antepartum, third trimester  O99.013    3. History of third degree perineal laceration  Z87.59    4. Insufficient prenatal care in third trimester  O09.33      P: Labor signs and symptoms discussed, aware of numbers to call.  Discussed warning signs of PIH/preeclampsia and patient will monitor.  GBS screen completed. Discussed plans for labor.  Patient counseled on risks after 41 weeks of gestation.  The health risks for you and your fetus may increase if a pregnancy is late term or postterm, but problems occur in only a small number of postterm pregnancies. Most women who give birth after their due dates have uncomplicated labor and give birth to healthy babies.  Discussed with family:     Risks associated with postterm pregnancy include the following:  -Stillbirth  -Macrosomia  -Postmaturity syndrome  -Meconium in the lungs of the fetus, which can cause serious breathing problems after birth  -Decreased amniotic fluid, which can cause the umbilical cord to pinch and  restrict the flow of oxygen to the fetus  -Other risks include an increased chance of an assisted vaginal delivery or  delivery. There also is a higher chance of infection and postpartum hemorrhage when your pregnancy goes past your due date.     Discussed postdates options and need for bi-weekly visit.   Plan for NST on Friday and will schedule BPP for following week if needed.   Encouraged patient to call with any questions or concerns.  Return to clinic 1 weeks     Lottie Eisenberg CNM

## 2022-05-17 NOTE — NURSING NOTE
"Chief Complaint   Patient presents with     Prenatal Care     40w 4d        Initial /60 (BP Location: Right arm, Cuff Size: Adult Large)   Wt 104.3 kg (230 lb)   LMP 2021   BMI 38.27 kg/m   Estimated body mass index is 38.27 kg/m  as calculated from the following:    Height as of 12/15/21: 1.651 m (5' 5\").    Weight as of this encounter: 104.3 kg (230 lb).  BP completed using cuff size: large    Questioned patient about current smoking habits.  Pt. has never smoked.          "

## 2022-05-20 ENCOUNTER — PRENATAL OFFICE VISIT (OUTPATIENT)
Dept: MIDWIFE SERVICES | Facility: CLINIC | Age: 27
End: 2022-05-20
Payer: COMMERCIAL

## 2022-05-20 ENCOUNTER — ANCILLARY PROCEDURE (OUTPATIENT)
Dept: ULTRASOUND IMAGING | Facility: CLINIC | Age: 27
End: 2022-05-20
Attending: ADVANCED PRACTICE MIDWIFE
Payer: COMMERCIAL

## 2022-05-20 VITALS — WEIGHT: 230.8 LBS | BODY MASS INDEX: 38.41 KG/M2 | DIASTOLIC BLOOD PRESSURE: 66 MMHG | SYSTOLIC BLOOD PRESSURE: 110 MMHG

## 2022-05-20 DIAGNOSIS — O99.013 ANEMIA OF MOTHER IN PREGNANCY, ANTEPARTUM, THIRD TRIMESTER: ICD-10-CM

## 2022-05-20 DIAGNOSIS — O48.0 POST-TERM PREGNANCY, 40-42 WEEKS OF GESTATION: ICD-10-CM

## 2022-05-20 DIAGNOSIS — Z87.59 HISTORY OF THIRD DEGREE PERINEAL LACERATION: ICD-10-CM

## 2022-05-20 DIAGNOSIS — O09.93 SUPERVISION OF HIGH RISK PREGNANCY IN THIRD TRIMESTER: Primary | ICD-10-CM

## 2022-05-20 PROCEDURE — 76819 FETAL BIOPHYS PROFIL W/O NST: CPT | Performed by: OBSTETRICS & GYNECOLOGY

## 2022-05-20 PROCEDURE — 99207 PR PRENATAL VISIT: CPT | Performed by: REGISTERED NURSE

## 2022-05-20 NOTE — PROGRESS NOTES
Partner's , Stephen, present    S: Feels ok, ready for baby to come- questions about induction versus expectant management.,  Baby active.  Denies uterine cramping, vaginal bleeding or leaking of fluid. No headache, increase in edema, no epigastric pain.     O: Vitals: /66 (BP Location: Left arm, Cuff Size: Adult Regular)   Wt 104.7 kg (230 lb 12.8 oz)   LMP 08/19/2021   BMI 38.41 kg/m    BMI= Body mass index is 38.41 kg/m .  Exam:  Constitutional: healthy, alert and no distress  Respiratory: respirations even and unlabored  Gastrointestinal: Abdomen soft, non-tender. Fundus measures appropriate for gestational age. Fetal heart tones hear without difficulty and within normal limits  : Normal external genitalia without lesions. FT/40/-2, average, mid. GONZALEZ 4  Psychiatric: mentation appears normal and affect normal/bright  A:     ICD-10-CM    1. Supervision of high risk pregnancy in third trimester  O09.93    2. Anemia of mother in pregnancy, antepartum, third trimester  O99.013    3. History of third degree perineal laceration  Z87.59    4. Post-term pregnancy, 40-42 weeks of gestation  O48.0      P: Labor signs and symptoms discussed, aware of numbers to call.  Discussed warning signs of PIH/preeclampsia and patient will monitor.  GBS screen completed previously and negative. Discussed plans for labor. Discussed   Discussed postdates options and need for bi-weekly BPPs to start at 41 weeks- first one today- report still pending. After thorough discussion of risks and benefits of induction and expectant management- patient and  elect induction. Would like to be induced tomorrow, if possible. Given SVE- discussed probable need of cervical ripening before progression to pitocin- understanding.   Encouraged patient to call with any questions or concerns.    Return for induction tomorrow,.  5/21/22 at 7:30pm. Verified with LD charge. Patient instructed to call one hr before to confirm spot.      Midwife on-call tomorrow, Clementina Smith CNM aware.     LAXMI Payan CNM

## 2022-05-21 ENCOUNTER — HOSPITAL ENCOUNTER (OUTPATIENT)
Facility: CLINIC | Age: 27
Setting detail: OBSERVATION
Discharge: HOME | End: 2022-05-22
Attending: ADVANCED PRACTICE MIDWIFE | Admitting: ADVANCED PRACTICE MIDWIFE
Payer: COMMERCIAL

## 2022-05-21 PROBLEM — O48.0 POST-DATES PREGNANCY: Status: ACTIVE | Noted: 2022-05-21

## 2022-05-21 LAB
ABO/RH(D): NORMAL
ANTIBODY SCREEN: NEGATIVE
ERYTHROCYTE [DISTWIDTH] IN BLOOD BY AUTOMATED COUNT: 21 % (ref 10–15)
HCT VFR BLD AUTO: 29.1 % (ref 35–47)
HGB BLD-MCNC: 8.5 G/DL (ref 11.7–15.7)
HOLD SPECIMEN: NORMAL
MCH RBC QN AUTO: 20.4 PG (ref 26.5–33)
MCHC RBC AUTO-ENTMCNC: 29.2 G/DL (ref 31.5–36.5)
MCV RBC AUTO: 70 FL (ref 78–100)
PLATELET # BLD AUTO: 205 10E3/UL (ref 150–450)
RBC # BLD AUTO: 4.17 10E6/UL (ref 3.8–5.2)
SARS-COV-2 RNA RESP QL NAA+PROBE: NEGATIVE
SPECIMEN EXPIRATION DATE: NORMAL
WBC # BLD AUTO: 6.6 10E3/UL (ref 4–11)

## 2022-05-21 PROCEDURE — 250N000009 HC RX 250: Performed by: ADVANCED PRACTICE MIDWIFE

## 2022-05-21 PROCEDURE — G0379 DIRECT REFER HOSPITAL OBSERV: HCPCS

## 2022-05-21 PROCEDURE — U0003 INFECTIOUS AGENT DETECTION BY NUCLEIC ACID (DNA OR RNA); SEVERE ACUTE RESPIRATORY SYNDROME CORONAVIRUS 2 (SARS-COV-2) (CORONAVIRUS DISEASE [COVID-19]), AMPLIFIED PROBE TECHNIQUE, MAKING USE OF HIGH THROUGHPUT TECHNOLOGIES AS DESCRIBED BY CMS-2020-01-R: HCPCS | Performed by: ADVANCED PRACTICE MIDWIFE

## 2022-05-21 PROCEDURE — 120N000001 HC R&B MED SURG/OB

## 2022-05-21 PROCEDURE — 86850 RBC ANTIBODY SCREEN: CPT | Performed by: ADVANCED PRACTICE MIDWIFE

## 2022-05-21 PROCEDURE — 96365 THER/PROPH/DIAG IV INF INIT: CPT

## 2022-05-21 PROCEDURE — G0378 HOSPITAL OBSERVATION PER HR: HCPCS

## 2022-05-21 PROCEDURE — 99221 1ST HOSP IP/OBS SF/LOW 40: CPT | Performed by: ADVANCED PRACTICE MIDWIFE

## 2022-05-21 PROCEDURE — 85027 COMPLETE CBC AUTOMATED: CPT | Performed by: ADVANCED PRACTICE MIDWIFE

## 2022-05-21 RX ORDER — PROCHLORPERAZINE 25 MG
25 SUPPOSITORY, RECTAL RECTAL EVERY 12 HOURS PRN
Status: DISCONTINUED | OUTPATIENT
Start: 2022-05-21 | End: 2022-05-22 | Stop reason: HOSPADM

## 2022-05-21 RX ORDER — OXYTOCIN/0.9 % SODIUM CHLORIDE 30/500 ML
340 PLASTIC BAG, INJECTION (ML) INTRAVENOUS CONTINUOUS PRN
Status: DISCONTINUED | OUTPATIENT
Start: 2022-05-21 | End: 2022-05-22 | Stop reason: HOSPADM

## 2022-05-21 RX ORDER — OXYTOCIN/0.9 % SODIUM CHLORIDE 30/500 ML
100-340 PLASTIC BAG, INJECTION (ML) INTRAVENOUS CONTINUOUS PRN
Status: DISCONTINUED | OUTPATIENT
Start: 2022-05-21 | End: 2022-05-22 | Stop reason: HOSPADM

## 2022-05-21 RX ORDER — ONDANSETRON 2 MG/ML
4 INJECTION INTRAMUSCULAR; INTRAVENOUS EVERY 6 HOURS PRN
Status: DISCONTINUED | OUTPATIENT
Start: 2022-05-21 | End: 2022-05-22 | Stop reason: HOSPADM

## 2022-05-21 RX ORDER — OXYTOCIN 10 [USP'U]/ML
10 INJECTION, SOLUTION INTRAMUSCULAR; INTRAVENOUS
Status: DISCONTINUED | OUTPATIENT
Start: 2022-05-21 | End: 2022-05-22 | Stop reason: HOSPADM

## 2022-05-21 RX ORDER — LIDOCAINE 40 MG/G
CREAM TOPICAL
Status: DISCONTINUED | OUTPATIENT
Start: 2022-05-21 | End: 2022-05-22 | Stop reason: HOSPADM

## 2022-05-21 RX ORDER — KETOROLAC TROMETHAMINE 30 MG/ML
30 INJECTION, SOLUTION INTRAMUSCULAR; INTRAVENOUS
Status: DISCONTINUED | OUTPATIENT
Start: 2022-05-21 | End: 2022-05-22 | Stop reason: HOSPADM

## 2022-05-21 RX ORDER — NALOXONE HYDROCHLORIDE 0.4 MG/ML
0.2 INJECTION, SOLUTION INTRAMUSCULAR; INTRAVENOUS; SUBCUTANEOUS
Status: DISCONTINUED | OUTPATIENT
Start: 2022-05-21 | End: 2022-05-22 | Stop reason: HOSPADM

## 2022-05-21 RX ORDER — CARBOPROST TROMETHAMINE 250 UG/ML
250 INJECTION, SOLUTION INTRAMUSCULAR
Status: DISCONTINUED | OUTPATIENT
Start: 2022-05-21 | End: 2022-05-22 | Stop reason: HOSPADM

## 2022-05-21 RX ORDER — NALOXONE HYDROCHLORIDE 0.4 MG/ML
0.4 INJECTION, SOLUTION INTRAMUSCULAR; INTRAVENOUS; SUBCUTANEOUS
Status: DISCONTINUED | OUTPATIENT
Start: 2022-05-21 | End: 2022-05-22 | Stop reason: HOSPADM

## 2022-05-21 RX ORDER — FENTANYL CITRATE 50 UG/ML
50 INJECTION, SOLUTION INTRAMUSCULAR; INTRAVENOUS EVERY 30 MIN PRN
Status: DISCONTINUED | OUTPATIENT
Start: 2022-05-21 | End: 2022-05-22 | Stop reason: HOSPADM

## 2022-05-21 RX ORDER — MISOPROSTOL 200 UG/1
800 TABLET ORAL
Status: DISCONTINUED | OUTPATIENT
Start: 2022-05-21 | End: 2022-05-22 | Stop reason: HOSPADM

## 2022-05-21 RX ORDER — CITRIC ACID/SODIUM CITRATE 334-500MG
30 SOLUTION, ORAL ORAL
Status: DISCONTINUED | OUTPATIENT
Start: 2022-05-21 | End: 2022-05-22 | Stop reason: HOSPADM

## 2022-05-21 RX ORDER — TRANEXAMIC ACID 10 MG/ML
1 INJECTION, SOLUTION INTRAVENOUS EVERY 30 MIN PRN
Status: DISCONTINUED | OUTPATIENT
Start: 2022-05-21 | End: 2022-05-22 | Stop reason: HOSPADM

## 2022-05-21 RX ORDER — OXYTOCIN/0.9 % SODIUM CHLORIDE 30/500 ML
1-24 PLASTIC BAG, INJECTION (ML) INTRAVENOUS CONTINUOUS
Status: DISCONTINUED | OUTPATIENT
Start: 2022-05-21 | End: 2022-05-22 | Stop reason: HOSPADM

## 2022-05-21 RX ORDER — IBUPROFEN 800 MG/1
800 TABLET, FILM COATED ORAL
Status: DISCONTINUED | OUTPATIENT
Start: 2022-05-21 | End: 2022-05-22 | Stop reason: HOSPADM

## 2022-05-21 RX ORDER — METOCLOPRAMIDE HYDROCHLORIDE 5 MG/ML
10 INJECTION INTRAMUSCULAR; INTRAVENOUS EVERY 6 HOURS PRN
Status: DISCONTINUED | OUTPATIENT
Start: 2022-05-21 | End: 2022-05-22 | Stop reason: HOSPADM

## 2022-05-21 RX ORDER — MISOPROSTOL 200 UG/1
400 TABLET ORAL
Status: DISCONTINUED | OUTPATIENT
Start: 2022-05-21 | End: 2022-05-22 | Stop reason: HOSPADM

## 2022-05-21 RX ORDER — METOCLOPRAMIDE 10 MG/1
10 TABLET ORAL EVERY 6 HOURS PRN
Status: DISCONTINUED | OUTPATIENT
Start: 2022-05-21 | End: 2022-05-22 | Stop reason: HOSPADM

## 2022-05-21 RX ORDER — SODIUM CHLORIDE, SODIUM LACTATE, POTASSIUM CHLORIDE, CALCIUM CHLORIDE 600; 310; 30; 20 MG/100ML; MG/100ML; MG/100ML; MG/100ML
INJECTION, SOLUTION INTRAVENOUS CONTINUOUS PRN
Status: DISCONTINUED | OUTPATIENT
Start: 2022-05-21 | End: 2022-05-22 | Stop reason: HOSPADM

## 2022-05-21 RX ORDER — TERBUTALINE SULFATE 1 MG/ML
0.25 INJECTION, SOLUTION SUBCUTANEOUS
Status: DISCONTINUED | OUTPATIENT
Start: 2022-05-21 | End: 2022-05-22 | Stop reason: HOSPADM

## 2022-05-21 RX ORDER — PROCHLORPERAZINE MALEATE 10 MG
10 TABLET ORAL EVERY 6 HOURS PRN
Status: DISCONTINUED | OUTPATIENT
Start: 2022-05-21 | End: 2022-05-22 | Stop reason: HOSPADM

## 2022-05-21 RX ORDER — METHYLERGONOVINE MALEATE 0.2 MG/ML
200 INJECTION INTRAVENOUS
Status: DISCONTINUED | OUTPATIENT
Start: 2022-05-21 | End: 2022-05-22 | Stop reason: HOSPADM

## 2022-05-21 RX ORDER — ONDANSETRON 4 MG/1
4 TABLET, ORALLY DISINTEGRATING ORAL EVERY 6 HOURS PRN
Status: DISCONTINUED | OUTPATIENT
Start: 2022-05-21 | End: 2022-05-22 | Stop reason: HOSPADM

## 2022-05-21 RX ADMIN — Medication 2 MILLI-UNITS/MIN: at 22:38

## 2022-05-21 ASSESSMENT — ACTIVITIES OF DAILY LIVING (ADL)
ADLS_ACUITY_SCORE: 18
ADLS_ACUITY_SCORE: 18

## 2022-05-22 VITALS — RESPIRATION RATE: 16 BRPM | SYSTOLIC BLOOD PRESSURE: 104 MMHG | TEMPERATURE: 98.5 F | DIASTOLIC BLOOD PRESSURE: 57 MMHG

## 2022-05-22 DIAGNOSIS — O48.0 POST-TERM PREGNANCY, 40-42 WEEKS OF GESTATION: Primary | ICD-10-CM

## 2022-05-22 PROCEDURE — G0378 HOSPITAL OBSERVATION PER HR: HCPCS

## 2022-05-22 PROCEDURE — 258N000003 HC RX IP 258 OP 636: Performed by: ADVANCED PRACTICE MIDWIFE

## 2022-05-22 PROCEDURE — 96366 THER/PROPH/DIAG IV INF ADDON: CPT

## 2022-05-22 PROCEDURE — 99231 SBSQ HOSP IP/OBS SF/LOW 25: CPT | Performed by: ADVANCED PRACTICE MIDWIFE

## 2022-05-22 RX ADMIN — SODIUM CHLORIDE, POTASSIUM CHLORIDE, SODIUM LACTATE AND CALCIUM CHLORIDE: 600; 310; 30; 20 INJECTION, SOLUTION INTRAVENOUS at 06:18

## 2022-05-22 ASSESSMENT — ACTIVITIES OF DAILY LIVING (ADL)
ADLS_ACUITY_SCORE: 18

## 2022-05-22 NOTE — H&P
HALINA Labor Admission History & Physical    Karen Loya is a 27 year old  with an IUP at 41w1d  Somalian; ,   Partner/support Person: Stephen  Language Barrier: English, Lebanese  Clinic: [facility]  Provider: LAXMI Sanchez CNM    Karen Loya is admitted to the Birthplace at [facility] on 2022 at 7:53 PM       History of present inllness/Chief Complaint:    Here with: induction of labor secondary to post dates  Patient reports contractions are Irregular     Baby active Yes  Membranes are intact.  Bloody show No   Any changes with medical history since last prenatal visit No  Declines syphilis screening.  Not needed as recent lab negative    Obstetrical history  Estimated Date of Delivery: May 13, 2022 determined by dating ultrasound  Patient's last menstrual period was 2021.   Dating U/S: 22    Fetal anatomic survey: Normal  Placenta: posterior    PRENATAL COURSE  Prenatal care began at 21 wks gestation for a total of 10 prenatal visits.  Total wt gain 0; There is no height or weight on file to calculate BMI.  Prenatal Blood Pressure: WNL  Prenatal course was   complicated by anemia, covid infection, low vitamin D  Patient Active Problem List    Diagnosis Date Noted     Post-dates pregnancy 2022     Priority: Medium     Poor fetal growth affecting management of mother in third trimester, single or unspecified fetus 2022     Priority: Medium     EFW 29% w/ AC 6% @ 37w.   F/U US 2022 :       Anemia of mother in pregnancy, antepartum, third trimester 2022     Priority: Medium     Vitamin D deficiency 2021     Priority: Medium     Vit D at IOB 9  Advised 1,000 international unit(s) Vit D3 daily       Obesity affecting pregnancy in third trimester 12/15/2021     Priority: Medium     BMI 38 pre preg  Ref to MFM for level II       History of third degree perineal laceration 12/15/2021     Priority: Medium     x2  Provide counseling and offer MD  referral [ ]        Supervision of high risk pregnancy in third trimester 2021     Priority: Medium       Clinic/Hospital: [facility]  Partner Name: Stephen  Ultrasound predicts sex: boy  Childrens names/ages: 20 month old boy  Previous labor experiences: IOL for preeclampsia,  of 7lb8oz boy   Waterbirth (interest, declined, ineligible): n/a  Level of education/occupation:   Pertinent History: Obesity, belkis. Macrosomia. Previous infant was suspected to be LGA and was AGA   PP contraception: Undecided   Hx PPD/Depression/Anxiety: Denies   Peds provider:    Plans for labor pain management: Epidural   Plans for post partum recovery/time off:  Feeding preference: breast   Breast pump:   Car seat:  Circumcision:  Discussed 2 week visit:  Tdap:   Flu:         History of pre-eclampsia in prior pregnancy, currently pregnant 2020     Priority: Medium     Baby ASA during pregnancy       Tdap: Declined  Rhogam: NA, O+    Patient Active Problem List   Diagnosis     History of pre-eclampsia in prior pregnancy, currently pregnant     Supervision of high risk pregnancy in third trimester     Obesity affecting pregnancy in third trimester     History of third degree perineal laceration     Vitamin D deficiency     Anemia of mother in pregnancy, antepartum, third trimester     Poor fetal growth affecting management of mother in third trimester, single or unspecified fetus     Post-dates pregnancy       HISTORY  No Known Allergies  Past Medical History:   Diagnosis Date     Nausea/vomiting in pregnancy 2020     No pertinent past medical history 2020     Post term pregnancy, antepartum 2021     Post-dates pregnancy 2021     Supervision of high risk pregnancy in third trimester 3/17/2021     Clinic/Hospital: [facility] Partner Name: Stephen Ultrasound predicts sex: boy Childrens names/ages: 20 month old boy Previous labor experiences: IOL for preeclampsia,  of 7lb8oz boy  Waterbirth (interest, declined,  ineligible): n/a Level of education/occupation:  Pertinent History: Obesity, belkis. Macrosomia. Previous infant was suspected to be LGA and was AGA  PP contraception: Undecided  Hx PPD/Dep     Past Surgical History:   Procedure Laterality Date     NO HISTORY OF SURGERY  2020     No family history on file.  Social History     Tobacco Use     Smoking status: Never Smoker     Smokeless tobacco: Never Used   Substance Use Topics     Alcohol use: Never     OB History    Para Term  AB Living   3 2 2 0 0 2   SAB IAB Ectopic Multiple Live Births   0 0 0 0 2      # Outcome Date GA Lbr Robert/2nd Weight Sex Delivery Anes PTL Lv   3 Current            2 Term 04/15/21 41w2d 05:17 / 02:04 3.82 kg (8 lb 6.8 oz) M Vag-Spont EPI N MARLA      Name: KATEY DIEZ-FRANKIE      Apgar1: 8  Apgar5: 9   1 Term 19 40w0d  3.175 kg (7 lb) M Vag-Spont   MARLA      Birth Comments: partial 3rd degree lac, IOL for GHTN       LABS:  Lab Results   Component Value Date    ABO O 2021    ABO O 2021    RH Pos 2021    RH Pos 2021    AS Negative 2021    HGB 8.4 (L) 2022    HEPBANG Nonreactive 2021    CHPCRT Negative 10/21/2020    GCPCRT Negative 10/21/2020       GBS Status:   Lab Results   Component Value Date    GBS Positive (A) 2021     Rubella: Immune    HIV: Non-Reactive   Platelets:  195  1hr GCT:  154, passed GTT    ROS   Pt is alert and oriented  Pt denies significant constitutional symptoms including fever and/or malaise.    Pt denies significant respiratory, cardiovacular, GI, or muscular/skeletal complaints.    Neuro: Denies HA and visual changes  Muscoloskeletal: Denies except for discomforts r/t pregnancy     PHYSICAL EXAM:  LMP 2021   General appearance:  healthy, alert, active and no distress   Heart: RRR  Lungs: CTA bilaterally, normal respiratory effort  Abdomen: gravid, single vertex fetus, non-tender, EFW 7 lbs.   Legs: reflexes 2+ bilaterally, no clonus, no edema      Contractions: Pt is not tia    Fetal heart tones: Baseline 150   Variability: moderate   Accelerations: absent  Decelerations: absent    NST: equivocal now, has only been on monitor for ~ 10 minutes    Will continue to monitor until reactive strip    Cervix: 1.5/ 50%/ Mid/ soft/ -2, Vtx  Bloody show: no  Membranes:  Intact    Cephalic presentation    ASSESSMENT:  27 year old  with santos IUP 41w1d for induction of labor.  Indication post dates  NST equivocal now  GBS negative and membranes intact    PLAN:  Routine CNM care  Labs ordered: Hemoglobin and type and screen  Teaching done r/t comfort measures, pain management options, and labor processes.  Admit - see IP orders  Labor induction with Pitocin  Pain medication , plans for epidural when she becomes more uncomfortable.  Anticipate     LUCIA LEWIS CNM

## 2022-05-22 NOTE — PROVIDER NOTIFICATION
05/22/22 0105   Provider Notification   Provider Name/Title Deisy Smith CNM   Method of Notification In Department   Notification Reason Status Update     CNM requesting update. Pit at 6, patient sleeping, category 1 tracing. No new orders received.

## 2022-05-22 NOTE — DISCHARGE INSTRUCTIONS
Discharge Instruction for Undelivered Patients      You were seen for:  Induction of Labor  We Consulted: Lottie MEADE and Clementina MEADE  You had (Test or Medicine): fetal and uterine monitoring     Diet:   Drink 8 to 12 glasses of liquids (milk, juice, water) every day.  You may eat meals and snacks.     Activity:  Call your doctor or nurse midwife if your baby is moving less than usual.     Call your provider if you notice:  Swelling in your face or increased swelling in your hands or legs.  Headaches that are not relieved by Tylenol (acetaminophen).  Changes in your vision (blurring: seeing spots or stars.)  Nausea (sick to your stomach) and vomiting (throwing up).   Weight gain of 5 pounds or more per week.  Heartburn that doesn't go away.  Signs of bladder infection: pain when you urinate (use the toilet), need to go more often and more urgently.  The bag of boles (rupture of membranes) breaks, or you notice leaking in your underwear.  Bright red blood in your underwear.  Abdominal (lower belly) or stomach pain.  For first baby: Contractions (tightening) less than 5 minutes apart for one hour or more.  Second (plus) baby: Contractions (tightening) less than 10 minutes apart and getting stronger.  *If less than 34 weeks: Contractions (tightening) more than 6 times in one hour.  Increase or change in vaginal discharge (note the color and amount)    Follow-up:  As scheduled in the clinic

## 2022-05-22 NOTE — PROVIDER NOTIFICATION
05/21/22 2005   Provider Notification   Provider Name/Title Deisy Smith CNM   Method of Notification At Bedside   Request Evaluate in Person     OB here to evaluate patient. SVE 1-2/5-/-2. Cephalic presentation confirmed by bedside ultrasound. Aguirre 6. CNM will place order for pitocin.

## 2022-05-22 NOTE — PROGRESS NOTES
CNM PROGRESS NOTE    SUBJECTIVE:  Has been sleeping most of the night. Feeling some of her contractions.    OBJECTIVE:  /57   Temp 98.5  F (36.9  C) (Oral)   Resp 16   LMP 08/19/2021     Fetal heart tones: Baseline 158   Variability: moderate, some periods of minimal variability  Accelerations: present  Decelerations: absent    Contractions: Pt is tia in an irregular pattern    Cervix: 1.5/ 50%/ -4, Vtx  ROM: not ruptured    Pitocin- 14 mu/min, just bumping up to 16 mu/min  Antibiotics- none  Cervical ripening: None    ASSESSMENT:  IUP @ 41w2d minimal/no progress   GBS- negative  Post dates   PLAN:   Discussed options including AROM, discontinuing pitocin and doing a course of cervical ripening, or considering discharge and scheduling induction again in a couple of days. Advised that I would recommend a BPP prior to discharge. Patient will consider options and will discuss further with oncoming CNM.      LUCIA LEWIS CNM

## 2022-05-22 NOTE — PROVIDER NOTIFICATION
05/22/22 0810   Provider Notification   Provider Name/Title HALINA Tafoya   Method of Notification At Bedside   Request Evaluate in Person   Notification Reason SVE;Other (Comment)     CNM at bedside to evaluate patient, discuss POC. After discussion of options, patient elected to go home. CNM and patient discussed follow up plan including plan for BPP on Tuesday 5/24; encouraged scheduling IOL by Friday 5/27 at which point she will be 42 weeks. Discussed reasons to return including s/sx of pre-eclampsia, labor, SROM, decreased fetal movement, vaginal bleeding, or any other concerns.

## 2022-05-22 NOTE — PROGRESS NOTES
[facility] CNM TRIAGE NOTE   is acting as .   Karen Loya is a 27 year old  with and IUP at 41w2d who presents for induction of labor for postdates at 41w1d. She was started on Pitocen for Aguirre score of 6.  Pitocen up to 16 mU overnight with no cervical change and a regressive Aguirre of 5. Outgoing CNM discussed options of ripening medications vs discharge and rescheduling of IOL. Pt and  wish to go home.     Patient states baby is active.  Denies ROM   Denies vaginal bleeding  Present OB History at [facility] with the CNMs.     Problems this pregnancy:   Patient Active Problem List    Diagnosis Date Noted     Post-dates pregnancy 2022     Priority: Medium     Poor fetal growth affecting management of mother in third trimester, single or unspecified fetus 2022     Priority: Medium     EFW 29% w/ AC 6% @ 37w.   F/U US 2022 :       Anemia of mother in pregnancy, antepartum, third trimester 2022     Priority: Medium     Vitamin D deficiency 2021     Priority: Medium     Vit D at IOB 9  Advised 1,000 international unit(s) Vit D3 daily       Obesity affecting pregnancy in third trimester 12/15/2021     Priority: Medium     BMI 38 pre preg  Ref to MFM for level II       History of third degree perineal laceration 12/15/2021     Priority: Medium     x2  Provide counseling and offer MD referral [ ]        Supervision of high risk pregnancy in third trimester 2021     Priority: Medium       Clinic/Hospital: [facility]  Partner Name: Stephen  Ultrasound predicts sex: boy  Childrens names/ages: 20 month old boy  Previous labor experiences: IOL for preeclampsia,  of 7lb8oz boy   Waterbirth (interest, declined, ineligible): n/a  Level of education/occupation:   Pertinent History: Obesity, belkis. Macrosomia. Previous infant was suspected to be LGA and was AGA   PP contraception: Undecided   Hx PPD/Depression/Anxiety: Denies   Peds provider:     Plans for labor pain management: Epidural   Plans for post partum recovery/time off:  Feeding preference: breast   Breast pump:   Car seat:  Circumcision:  Discussed 2 week visit:  Tdap:   Flu:         History of pre-eclampsia in prior pregnancy, currently pregnant 2020     Priority: Medium     Baby ASA during pregnancy           ROS:  Patient is alert and oriented    PHYSICAL EXAM:  /57   Temp 98.5  F (36.9  C) (Oral)   Resp 16   LMP 2021     FHT's 135 with moderate variability  Accelerations: present   Decelerations:  absent        Contractions: Pt is tia every 2-5 minutes, lasting  seconds and palpates mild. She has been sleeping through them.    Abdomen: gravid, soft   Bloody show: no  Cervix: 1.5/ 50%/ Mid// -4  Membranes are intact       ASSESSMENT :   27 year old  with santos IUP 41w2d not in labor  NST  reactive  GBS negative and membranes intact  Anemia with hgb  8.5  Hx of third degree laceration     PLAN:  Discharge home  Continue routine prenatal care to include visit and ultrasound this week.   Ultrasound ordered.       Teaching done r/t to s/s of labor, SROM, decreased fetal movement, comfort measures in third trimester.  Instructed to please refer to the discharge handouts, the RN triage line or on-call CNM for any questions or concerns.  Pt verbalizes understanding and agreement with current plan of care.    Lottie Eisenberg CNM

## 2022-05-22 NOTE — PROVIDER NOTIFICATION
05/22/22 0645   Provider Notification   Provider Name/Title Deisy Luis MEADE   Method of Notification At Bedside   Notification Reason SVE     CNM here to evaluate. Pit at 14. Patient has been sleeping well most of the night. Contractions irregular. Minimal variability at times, then moderate with run of contractions. One variable noted. SVE 1-2/50/-3, tillman 5. No new orders received.

## 2022-05-23 NOTE — DISCHARGE SUMMARY
Lottie Eisenberg, CNM   Midwife   OB/Gyn   Progress Notes       Signed   Date of Service:  2022  8:21 AM   Creation Time:  2022  8:21 AM                      []Hide copied text    []Chance for details    [facility] CNM TRIAGE NOTE   is acting as .   Karen Loya is a 27 year old  with and IUP at 41w2d who presents for induction of labor for postdates at 41w1d. She was started on Pitocen for Aguirre score of 6.  Pitocen up to 16 mU overnight with no cervical change and a regressive Aguirre of 5. Outgoing CNM discussed options of ripening medications vs discharge and rescheduling of IOL. Pt and  wish to go home.      Patient states baby is active.  Denies ROM   Denies vaginal bleeding  Present OB History at [facility] with the CNMs.      Problems this pregnancy:         Patient Active Problem List     Diagnosis Date Noted     Post-dates pregnancy 2022       Priority: Medium     Poor fetal growth affecting management of mother in third trimester, single or unspecified fetus 2022       Priority: Medium       EFW 29% w/ AC 6% @ 37w.   F/U US 2022 :        Anemia of mother in pregnancy, antepartum, third trimester 2022       Priority: Medium     Vitamin D deficiency 2021       Priority: Medium       Vit D at IOB 9  Advised 1,000 international unit(s) Vit D3 daily        Obesity affecting pregnancy in third trimester 12/15/2021       Priority: Medium       BMI 38 pre preg  Ref to MFM for level II        History of third degree perineal laceration 12/15/2021       Priority: Medium       x2  Provide counseling and offer MD referral [ ]         Supervision of high risk pregnancy in third trimester 2021       Priority: Medium          Clinic/Hospital: [facility]  Partner Name: Stephen  Ultrasound predicts sex: boy  Childrens names/ages: 20 month old boy  Previous labor experiences: IOL for preeclampsia,  of 7lb8oz boy   Waterbirth  (interest, declined, ineligible): n/a  Level of education/occupation:   Pertinent History: Obesity, belkis. Macrosomia. Previous infant was suspected to be LGA and was AGA   PP contraception: Undecided   Hx PPD/Depression/Anxiety: Denies   Peds provider:    Plans for labor pain management: Epidural   Plans for post partum recovery/time off:  Feeding preference: breast   Breast pump:   Car seat:  Circumcision:  Discussed 2 week visit:  Tdap:   Flu:           History of pre-eclampsia in prior pregnancy, currently pregnant 2020       Priority: Medium       Baby ASA during pregnancy               ROS:  Patient is alert and oriented     PHYSICAL EXAM:  /57   Temp 98.5  F (36.9  C) (Oral)   Resp 16   LMP 2021      FHT's 135 with moderate variability  Accelerations: present   Decelerations:  absent        Contractions: Pt is tia every 2-5 minutes, lasting  seconds and palpates mild. She has been sleeping through them.     Abdomen: gravid, soft   Bloody show: no  Cervix: 1.5/ 50%/ Mid// -4  Membranes are intact         ASSESSMENT :   27 year old  with santos IUP 41w2d not in labor  NST  reactive  GBS negative and membranes intact  Anemia with hgb  8.5  Hx of third degree laceration      PLAN:  Discharge home  Continue routine prenatal care to include visit and ultrasound this week.   Ultrasound ordered.         Teaching done r/t to s/s of labor, SROM, decreased fetal movement, comfort measures in third trimester.  Instructed to please refer to the discharge handouts, the RN triage line or on-call CNM for any questions or concerns.  Pt verbalizes understanding and agreement with current plan of care.     HALINA Santos Colleen L, CNM   Midwife   OB/Gyn   Progress Notes       Signed   Date of Service:  2022  8:21 AM   Creation Time:  2022  8:21 AM                      []Hide copied text    []Hover for details    [facility] CNM TRIAGE NOTE   is acting as .   Karen Loya is a 27 year old  with and IUP at 41w2d who presents for induction of labor for postdates at 41w1d. She was started on Pitocen for Aguirre score of 6.  Pitocen up to 16 mU overnight with no cervical change and a regressive Aguirre of 5. Outgoing CNM discussed options of ripening medications vs discharge and rescheduling of IOL. Pt and  wish to go home.      Patient states baby is active.  Denies ROM   Denies vaginal bleeding  Present OB History at [facility] with the CNMs.      Problems this pregnancy:         Patient Active Problem List     Diagnosis Date Noted     Post-dates pregnancy 2022       Priority: Medium     Poor fetal growth affecting management of mother in third trimester, single or unspecified fetus 2022       Priority: Medium       EFW 29% w/ AC 6% @ 37w.   F/U US 2022 :        Anemia of mother in pregnancy, antepartum, third trimester 2022       Priority: Medium     Vitamin D deficiency 2021       Priority: Medium       Vit D at IOB 9  Advised 1,000 international unit(s) Vit D3 daily        Obesity affecting pregnancy in third trimester 12/15/2021       Priority: Medium       BMI 38 pre preg  Ref to MFM for level II        History of third degree perineal laceration 12/15/2021       Priority: Medium       x2  Provide counseling and offer MD referral [ ]         Supervision of high risk pregnancy in third trimester 2021       Priority: Medium          Clinic/Hospital: [facility]  Partner Name: Stephen  Ultrasound predicts sex: boy  Childrens names/ages: 20 month old boy  Previous labor experiences: IOL for preeclampsia,  of 7lb8oz boy   Waterbirth (interest, declined, ineligible): n/a  Level of education/occupation:   Pertinent History: Obesity, belkis. Macrosomia. Previous infant was suspected to be LGA and was AGA   PP contraception: Undecided   Hx PPD/Depression/Anxiety: Denies    Peds provider:    Plans for labor pain management: Epidural   Plans for post partum recovery/time off:  Feeding preference: breast   Breast pump:   Car seat:  Circumcision:  Discussed 2 week visit:  Tdap:   Flu:           History of pre-eclampsia in prior pregnancy, currently pregnant 2020       Priority: Medium       Baby ASA during pregnancy               ROS:  Patient is alert and oriented     PHYSICAL EXAM:  /57   Temp 98.5  F (36.9  C) (Oral)   Resp 16   LMP 2021      FHT's 135 with moderate variability  Accelerations: present   Decelerations:  absent        Contractions: Pt is tia every 2-5 minutes, lasting  seconds and palpates mild. She has been sleeping through them.     Abdomen: gravid, soft   Bloody show: no  Cervix: 1.5/ 50%/ Mid// -4  Membranes are intact         ASSESSMENT :   27 year old  with santos IUP 41w2d not in labor  NST  reactive  GBS negative and membranes intact  Anemia with hgb  8.5  Hx of third degree laceration      PLAN:  Discharge home  Continue routine prenatal care to include visit and ultrasound this week.   Ultrasound ordered.         Teaching done r/t to s/s of labor, SROM, decreased fetal movement, comfort measures in third trimester.  Instructed to please refer to the discharge handouts, the RN triage line or on-call CNM for any questions or concerns.  Pt verbalizes understanding and agreement with current plan of care.     Lottie Eisenberg CNM

## 2022-05-24 ENCOUNTER — PRENATAL OFFICE VISIT (OUTPATIENT)
Dept: MIDWIFE SERVICES | Facility: CLINIC | Age: 27
End: 2022-05-24
Payer: COMMERCIAL

## 2022-05-24 VITALS — SYSTOLIC BLOOD PRESSURE: 104 MMHG | DIASTOLIC BLOOD PRESSURE: 58 MMHG

## 2022-05-24 DIAGNOSIS — O99.013 ANEMIA OF MOTHER IN PREGNANCY, ANTEPARTUM, THIRD TRIMESTER: ICD-10-CM

## 2022-05-24 DIAGNOSIS — Z34.90 ENCOUNTER FOR INDUCTION OF LABOR: ICD-10-CM

## 2022-05-24 DIAGNOSIS — O48.0 POST-TERM PREGNANCY, 40-42 WEEKS OF GESTATION: ICD-10-CM

## 2022-05-24 DIAGNOSIS — O09.93 SUPERVISION OF HIGH RISK PREGNANCY IN THIRD TRIMESTER: Primary | ICD-10-CM

## 2022-05-24 DIAGNOSIS — Z87.59 HISTORY OF THIRD DEGREE PERINEAL LACERATION: ICD-10-CM

## 2022-05-24 PROCEDURE — 99207 PR PRENATAL VISIT: CPT | Performed by: ADVANCED PRACTICE MIDWIFE

## 2022-05-24 NOTE — PROGRESS NOTES
S: Karen is here today after failure to progress for IOL on Saturday night 5/21. She left Sunday morning and was agreeable to office visit today.   Feels well. Is here alone and declines . Her mood is bright today.  Baby active. Denies uterine cramping, vaginal bleeding or leaking of fluid. No headache, increase in edema, no epigastric pain.   O: Vitals: LMP 08/19/2021   BMI= There is no height or weight on file to calculate BMI.  Exam:  Constitutional: healthy, alert and no distress  Respiratory: respirations even and unlabored  Gastrointestinal: Abdomen soft, non-tender. Fundus measures appropriate for gestational age. Fetal heart tones hear without difficulty and within normal limits  : Normal external genitalia without lesions  Psychiatric: mentation appears normal and affect normal/bright  A:     ICD-10-CM    1. Supervision of high risk pregnancy in third trimester  O09.93    2. Post-term pregnancy, 40-42 weeks of gestation  O48.0    3. History of third degree perineal laceration  Z87.59    4. Anemia of mother in pregnancy, antepartum, third trimester  O99.013      P: Discussed warning signs of PIH/preeclampsia and patient will monitor.  GBS screen completed. Discussed plans for labor.   Discussed postdates options.  Encouraged patient to call with any questions or concerns.    BPP on 5/20 :  8/8,  SAKSHI:  3.48  Warning signs reviewed   Labor signs and symptoms discussed, aware of numbers to call  Induction of labor discussed and recommended by 42 weeks  Induction of labor scheduled:  Yes     BPP 5/24:   8/8 with SAKSHI of 5.32   Aguirre Score:  Dilation of Cervix:  1-2     Score = 1  Effacement:  40-50%;   Score = 1  Consistency:  Average;   Score = 1  Position:  Mid;   Score = 1  Station:  -2;   Score = 1  Total Aguirre Score:  5    Date/Time for induction of labor: Friday 5/27 at 730  Cervical ripening needed:  No  Lottie Eisenberg CNM

## 2022-05-25 ENCOUNTER — TELEPHONE (OUTPATIENT)
Dept: OBGYN | Facility: CLINIC | Age: 27
End: 2022-05-25
Payer: COMMERCIAL

## 2022-05-25 NOTE — TELEPHONE ENCOUNTER
Unable to LM on pts phone.  Call made to , informed letter at  in [place] for pt to .  Dilma Silva RN

## 2022-05-25 NOTE — TELEPHONE ENCOUNTER
I have written the letter. Does the letter need to be faxed somewhere, or should I print it and leave it at the .        Thank you,    LAXMI Sanchez, REEM

## 2022-05-25 NOTE — TELEPHONE ENCOUNTER
Patient calling via [facility] .  41w5d  Needs a letter from the midwives stating that she is having a baby on Friday,and needs heat turned on.  States has been without heat for 3 weeks.  (cold in the house)  [employer]    Please advise.  Dilma Silva RN

## 2022-05-26 ENCOUNTER — TELEPHONE (OUTPATIENT)
Dept: OBGYN | Facility: CLINIC | Age: 27
End: 2022-05-26

## 2022-05-26 ENCOUNTER — LAB (OUTPATIENT)
Dept: URGENT CARE | Facility: URGENT CARE | Age: 27
End: 2022-05-26
Attending: ADVANCED PRACTICE MIDWIFE
Payer: COMMERCIAL

## 2022-05-26 DIAGNOSIS — O48.0 POST-TERM PREGNANCY, 40-42 WEEKS OF GESTATION: ICD-10-CM

## 2022-05-26 DIAGNOSIS — Z34.90 ENCOUNTER FOR INDUCTION OF LABOR: ICD-10-CM

## 2022-05-26 PROCEDURE — U0003 INFECTIOUS AGENT DETECTION BY NUCLEIC ACID (DNA OR RNA); SEVERE ACUTE RESPIRATORY SYNDROME CORONAVIRUS 2 (SARS-COV-2) (CORONAVIRUS DISEASE [COVID-19]), AMPLIFIED PROBE TECHNIQUE, MAKING USE OF HIGH THROUGHPUT TECHNOLOGIES AS DESCRIBED BY CMS-2020-01-R: HCPCS

## 2022-05-26 PROCEDURE — U0005 INFEC AGEN DETEC AMPLI PROBE: HCPCS

## 2022-05-26 NOTE — TELEPHONE ENCOUNTER
Form received from: Via Fax for pt; Karen Loya     Form requesting following info/need: Physicians Certificate     FERNANDO needed?: NA     Location of form: Basket above Gloria's desk     When completed the route for return: fax to number on form

## 2022-05-26 NOTE — TELEPHONE ENCOUNTER
Forms in process. Placed in J's bin in the midwife office in  for review, completion and signatures.

## 2022-05-27 ENCOUNTER — HOSPITAL ENCOUNTER (INPATIENT)
Facility: CLINIC | Age: 27
LOS: 2 days | Discharge: HOME HEALTH | End: 2022-05-29
Attending: ADVANCED PRACTICE MIDWIFE | Admitting: ADVANCED PRACTICE MIDWIFE
Payer: COMMERCIAL

## 2022-05-27 LAB
ABO/RH(D): NORMAL
ANTIBODY SCREEN: NEGATIVE
ERYTHROCYTE [DISTWIDTH] IN BLOOD BY AUTOMATED COUNT: 20.8 % (ref 10–15)
HCT VFR BLD AUTO: 30.4 % (ref 35–47)
HGB BLD-MCNC: 8.9 G/DL (ref 11.7–15.7)
HOLD SPECIMEN: NORMAL
HOLD SPECIMEN: NORMAL
MCH RBC QN AUTO: 20.4 PG (ref 26.5–33)
MCHC RBC AUTO-ENTMCNC: 29.3 G/DL (ref 31.5–36.5)
MCV RBC AUTO: 70 FL (ref 78–100)
PLATELET # BLD AUTO: 223 10E3/UL (ref 150–450)
RBC # BLD AUTO: 4.37 10E6/UL (ref 3.8–5.2)
SARS-COV-2 RNA RESP QL NAA+PROBE: NEGATIVE
SPECIMEN EXPIRATION DATE: NORMAL
WBC # BLD AUTO: 6 10E3/UL (ref 4–11)

## 2022-05-27 PROCEDURE — 0KQM0ZZ REPAIR PERINEUM MUSCLE, OPEN APPROACH: ICD-10-PCS | Performed by: ADVANCED PRACTICE MIDWIFE

## 2022-05-27 PROCEDURE — 272N000433 HC KIT CATH IV 18 OR 20G CM, POWERGLIDE W MAX BARRIER

## 2022-05-27 PROCEDURE — 250N000011 HC RX IP 250 OP 636: Performed by: ADVANCED PRACTICE MIDWIFE

## 2022-05-27 PROCEDURE — 36569 INSJ PICC 5 YR+ W/O IMAGING: CPT

## 2022-05-27 PROCEDURE — 85027 COMPLETE CBC AUTOMATED: CPT | Performed by: ADVANCED PRACTICE MIDWIFE

## 2022-05-27 PROCEDURE — 36415 COLL VENOUS BLD VENIPUNCTURE: CPT | Performed by: ADVANCED PRACTICE MIDWIFE

## 2022-05-27 PROCEDURE — 250N000013 HC RX MED GY IP 250 OP 250 PS 637: Performed by: ADVANCED PRACTICE MIDWIFE

## 2022-05-27 PROCEDURE — 86901 BLOOD TYPING SEROLOGIC RH(D): CPT | Performed by: ADVANCED PRACTICE MIDWIFE

## 2022-05-27 PROCEDURE — 250N000009 HC RX 250: Performed by: ADVANCED PRACTICE MIDWIFE

## 2022-05-27 PROCEDURE — 59400 OBSTETRICAL CARE: CPT | Performed by: ADVANCED PRACTICE MIDWIFE

## 2022-05-27 PROCEDURE — 999N000127 HC STATISTIC PERIPHERAL IV START W US GUIDANCE

## 2022-05-27 PROCEDURE — 3E033VJ INTRODUCTION OF OTHER HORMONE INTO PERIPHERAL VEIN, PERCUTANEOUS APPROACH: ICD-10-PCS | Performed by: ADVANCED PRACTICE MIDWIFE

## 2022-05-27 PROCEDURE — 86850 RBC ANTIBODY SCREEN: CPT | Performed by: ADVANCED PRACTICE MIDWIFE

## 2022-05-27 PROCEDURE — 722N000001 HC LABOR CARE VAGINAL DELIVERY SINGLE

## 2022-05-27 PROCEDURE — 120N000001 HC R&B MED SURG/OB

## 2022-05-27 PROCEDURE — 258N000003 HC RX IP 258 OP 636: Performed by: ADVANCED PRACTICE MIDWIFE

## 2022-05-27 RX ORDER — PROCHLORPERAZINE MALEATE 10 MG
10 TABLET ORAL EVERY 6 HOURS PRN
Status: DISCONTINUED | OUTPATIENT
Start: 2022-05-27 | End: 2022-05-27

## 2022-05-27 RX ORDER — OXYTOCIN 10 [USP'U]/ML
10 INJECTION, SOLUTION INTRAMUSCULAR; INTRAVENOUS
Status: DISCONTINUED | OUTPATIENT
Start: 2022-05-27 | End: 2022-05-29 | Stop reason: HOSPADM

## 2022-05-27 RX ORDER — NALOXONE HYDROCHLORIDE 0.4 MG/ML
0.2 INJECTION, SOLUTION INTRAMUSCULAR; INTRAVENOUS; SUBCUTANEOUS
Status: DISCONTINUED | OUTPATIENT
Start: 2022-05-27 | End: 2022-05-27

## 2022-05-27 RX ORDER — TRANEXAMIC ACID 10 MG/ML
1 INJECTION, SOLUTION INTRAVENOUS EVERY 30 MIN PRN
Status: DISCONTINUED | OUTPATIENT
Start: 2022-05-27 | End: 2022-05-29 | Stop reason: HOSPADM

## 2022-05-27 RX ORDER — MISOPROSTOL 200 UG/1
800 TABLET ORAL
Status: DISCONTINUED | OUTPATIENT
Start: 2022-05-27 | End: 2022-05-27

## 2022-05-27 RX ORDER — KETOROLAC TROMETHAMINE 30 MG/ML
30 INJECTION, SOLUTION INTRAMUSCULAR; INTRAVENOUS
Status: DISCONTINUED | OUTPATIENT
Start: 2022-05-27 | End: 2022-05-27

## 2022-05-27 RX ORDER — EPHEDRINE SULFATE 50 MG/ML
5 INJECTION, SOLUTION INTRAMUSCULAR; INTRAVENOUS; SUBCUTANEOUS
Status: DISCONTINUED | OUTPATIENT
Start: 2022-05-27 | End: 2022-05-27

## 2022-05-27 RX ORDER — METHYLERGONOVINE MALEATE 0.2 MG/ML
200 INJECTION INTRAVENOUS
Status: DISCONTINUED | OUTPATIENT
Start: 2022-05-27 | End: 2022-05-29 | Stop reason: HOSPADM

## 2022-05-27 RX ORDER — FENTANYL CITRATE 50 UG/ML
100 INJECTION, SOLUTION INTRAMUSCULAR; INTRAVENOUS
Status: DISCONTINUED | OUTPATIENT
Start: 2022-05-27 | End: 2022-05-27

## 2022-05-27 RX ORDER — NALOXONE HYDROCHLORIDE 0.4 MG/ML
0.4 INJECTION, SOLUTION INTRAMUSCULAR; INTRAVENOUS; SUBCUTANEOUS
Status: DISCONTINUED | OUTPATIENT
Start: 2022-05-27 | End: 2022-05-27

## 2022-05-27 RX ORDER — OXYTOCIN 10 [USP'U]/ML
10 INJECTION, SOLUTION INTRAMUSCULAR; INTRAVENOUS
Status: DISCONTINUED | OUTPATIENT
Start: 2022-05-27 | End: 2022-05-27

## 2022-05-27 RX ORDER — METOCLOPRAMIDE 10 MG/1
10 TABLET ORAL EVERY 6 HOURS PRN
Status: DISCONTINUED | OUTPATIENT
Start: 2022-05-27 | End: 2022-05-27

## 2022-05-27 RX ORDER — OXYTOCIN/0.9 % SODIUM CHLORIDE 30/500 ML
1-24 PLASTIC BAG, INJECTION (ML) INTRAVENOUS CONTINUOUS
Status: DISCONTINUED | OUTPATIENT
Start: 2022-05-27 | End: 2022-05-27

## 2022-05-27 RX ORDER — IBUPROFEN 800 MG/1
800 TABLET, FILM COATED ORAL
Status: DISCONTINUED | OUTPATIENT
Start: 2022-05-27 | End: 2022-05-27

## 2022-05-27 RX ORDER — CARBOPROST TROMETHAMINE 250 UG/ML
250 INJECTION, SOLUTION INTRAMUSCULAR
Status: DISCONTINUED | OUTPATIENT
Start: 2022-05-27 | End: 2022-05-29 | Stop reason: HOSPADM

## 2022-05-27 RX ORDER — OXYTOCIN/0.9 % SODIUM CHLORIDE 30/500 ML
100-340 PLASTIC BAG, INJECTION (ML) INTRAVENOUS CONTINUOUS PRN
Status: DISCONTINUED | OUTPATIENT
Start: 2022-05-27 | End: 2022-05-27

## 2022-05-27 RX ORDER — NALBUPHINE HYDROCHLORIDE 10 MG/ML
2.5-5 INJECTION, SOLUTION INTRAMUSCULAR; INTRAVENOUS; SUBCUTANEOUS EVERY 6 HOURS PRN
Status: DISCONTINUED | OUTPATIENT
Start: 2022-05-27 | End: 2022-05-27

## 2022-05-27 RX ORDER — METHYLERGONOVINE MALEATE 0.2 MG/ML
200 INJECTION INTRAVENOUS
Status: DISCONTINUED | OUTPATIENT
Start: 2022-05-27 | End: 2022-05-27

## 2022-05-27 RX ORDER — CARBOPROST TROMETHAMINE 250 UG/ML
250 INJECTION, SOLUTION INTRAMUSCULAR
Status: DISCONTINUED | OUTPATIENT
Start: 2022-05-27 | End: 2022-05-27

## 2022-05-27 RX ORDER — ONDANSETRON 4 MG/1
4 TABLET, ORALLY DISINTEGRATING ORAL EVERY 6 HOURS PRN
Status: DISCONTINUED | OUTPATIENT
Start: 2022-05-27 | End: 2022-05-27

## 2022-05-27 RX ORDER — ACETAMINOPHEN 325 MG/1
650 TABLET ORAL EVERY 4 HOURS PRN
Status: DISCONTINUED | OUTPATIENT
Start: 2022-05-27 | End: 2022-05-29 | Stop reason: HOSPADM

## 2022-05-27 RX ORDER — SCOLOPAMINE TRANSDERMAL SYSTEM 1 MG/1
1 PATCH, EXTENDED RELEASE TRANSDERMAL ONCE
Status: DISCONTINUED | OUTPATIENT
Start: 2022-05-27 | End: 2022-05-27

## 2022-05-27 RX ORDER — MISOPROSTOL 200 UG/1
400 TABLET ORAL
Status: DISCONTINUED | OUTPATIENT
Start: 2022-05-27 | End: 2022-05-29 | Stop reason: HOSPADM

## 2022-05-27 RX ORDER — MISOPROSTOL 200 UG/1
800 TABLET ORAL
Status: DISCONTINUED | OUTPATIENT
Start: 2022-05-27 | End: 2022-05-29 | Stop reason: HOSPADM

## 2022-05-27 RX ORDER — TRANEXAMIC ACID 10 MG/ML
1 INJECTION, SOLUTION INTRAVENOUS EVERY 30 MIN PRN
Status: DISCONTINUED | OUTPATIENT
Start: 2022-05-27 | End: 2022-05-27

## 2022-05-27 RX ORDER — BISACODYL 10 MG
10 SUPPOSITORY, RECTAL RECTAL DAILY PRN
Status: DISCONTINUED | OUTPATIENT
Start: 2022-05-27 | End: 2022-05-29 | Stop reason: HOSPADM

## 2022-05-27 RX ORDER — OXYTOCIN/0.9 % SODIUM CHLORIDE 30/500 ML
340 PLASTIC BAG, INJECTION (ML) INTRAVENOUS CONTINUOUS PRN
Status: DISCONTINUED | OUTPATIENT
Start: 2022-05-27 | End: 2022-05-29 | Stop reason: HOSPADM

## 2022-05-27 RX ORDER — TERBUTALINE SULFATE 1 MG/ML
0.25 INJECTION, SOLUTION SUBCUTANEOUS
Status: DISCONTINUED | OUTPATIENT
Start: 2022-05-27 | End: 2022-05-27

## 2022-05-27 RX ORDER — HYDROCORTISONE 25 MG/G
CREAM TOPICAL 3 TIMES DAILY PRN
Status: DISCONTINUED | OUTPATIENT
Start: 2022-05-27 | End: 2022-05-29 | Stop reason: HOSPADM

## 2022-05-27 RX ORDER — ONDANSETRON 2 MG/ML
4 INJECTION INTRAMUSCULAR; INTRAVENOUS EVERY 6 HOURS PRN
Status: DISCONTINUED | OUTPATIENT
Start: 2022-05-27 | End: 2022-05-27

## 2022-05-27 RX ORDER — MISOPROSTOL 200 UG/1
400 TABLET ORAL
Status: DISCONTINUED | OUTPATIENT
Start: 2022-05-27 | End: 2022-05-27

## 2022-05-27 RX ORDER — OXYTOCIN/0.9 % SODIUM CHLORIDE 30/500 ML
340 PLASTIC BAG, INJECTION (ML) INTRAVENOUS CONTINUOUS PRN
Status: COMPLETED | OUTPATIENT
Start: 2022-05-27 | End: 2022-05-27

## 2022-05-27 RX ORDER — PROCHLORPERAZINE 25 MG
25 SUPPOSITORY, RECTAL RECTAL EVERY 12 HOURS PRN
Status: DISCONTINUED | OUTPATIENT
Start: 2022-05-27 | End: 2022-05-27

## 2022-05-27 RX ORDER — METOCLOPRAMIDE HYDROCHLORIDE 5 MG/ML
10 INJECTION INTRAMUSCULAR; INTRAVENOUS EVERY 6 HOURS PRN
Status: DISCONTINUED | OUTPATIENT
Start: 2022-05-27 | End: 2022-05-27

## 2022-05-27 RX ORDER — SODIUM CHLORIDE, SODIUM LACTATE, POTASSIUM CHLORIDE, CALCIUM CHLORIDE 600; 310; 30; 20 MG/100ML; MG/100ML; MG/100ML; MG/100ML
INJECTION, SOLUTION INTRAVENOUS CONTINUOUS PRN
Status: DISCONTINUED | OUTPATIENT
Start: 2022-05-27 | End: 2022-05-27

## 2022-05-27 RX ORDER — IBUPROFEN 800 MG/1
800 TABLET, FILM COATED ORAL EVERY 6 HOURS PRN
Status: DISCONTINUED | OUTPATIENT
Start: 2022-05-27 | End: 2022-05-29 | Stop reason: HOSPADM

## 2022-05-27 RX ORDER — MODIFIED LANOLIN
OINTMENT (GRAM) TOPICAL
Status: DISCONTINUED | OUTPATIENT
Start: 2022-05-27 | End: 2022-05-29 | Stop reason: HOSPADM

## 2022-05-27 RX ORDER — CITRIC ACID/SODIUM CITRATE 334-500MG
30 SOLUTION, ORAL ORAL
Status: DISCONTINUED | OUTPATIENT
Start: 2022-05-27 | End: 2022-05-27

## 2022-05-27 RX ORDER — DOCUSATE SODIUM 100 MG/1
100 CAPSULE, LIQUID FILLED ORAL DAILY
Status: DISCONTINUED | OUTPATIENT
Start: 2022-05-27 | End: 2022-05-29 | Stop reason: HOSPADM

## 2022-05-27 RX ORDER — LIDOCAINE 40 MG/G
CREAM TOPICAL
Status: DISCONTINUED | OUTPATIENT
Start: 2022-05-27 | End: 2022-05-27

## 2022-05-27 RX ADMIN — FENTANYL CITRATE 100 MCG: 50 INJECTION, SOLUTION INTRAMUSCULAR; INTRAVENOUS at 17:57

## 2022-05-27 RX ADMIN — SODIUM CHLORIDE, POTASSIUM CHLORIDE, SODIUM LACTATE AND CALCIUM CHLORIDE: 600; 310; 30; 20 INJECTION, SOLUTION INTRAVENOUS at 08:36

## 2022-05-27 RX ADMIN — SODIUM CHLORIDE, POTASSIUM CHLORIDE, SODIUM LACTATE AND CALCIUM CHLORIDE: 600; 310; 30; 20 INJECTION, SOLUTION INTRAVENOUS at 09:47

## 2022-05-27 RX ADMIN — TRANEXAMIC ACID 1 G: 10 INJECTION, SOLUTION INTRAVENOUS at 17:21

## 2022-05-27 RX ADMIN — ACETAMINOPHEN 650 MG: 325 TABLET, FILM COATED ORAL at 20:38

## 2022-05-27 RX ADMIN — Medication 340 ML/HR: at 17:40

## 2022-05-27 RX ADMIN — Medication 2 MILLI-UNITS/MIN: at 09:48

## 2022-05-27 RX ADMIN — SODIUM CHLORIDE, POTASSIUM CHLORIDE, SODIUM LACTATE AND CALCIUM CHLORIDE 500 ML: 600; 310; 30; 20 INJECTION, SOLUTION INTRAVENOUS at 16:44

## 2022-05-27 RX ADMIN — LIDOCAINE HYDROCHLORIDE 5 ML: 10 INJECTION, SOLUTION EPIDURAL; INFILTRATION; INTRACAUDAL; PERINEURAL at 17:49

## 2022-05-27 RX ADMIN — KETOROLAC TROMETHAMINE 30 MG: 30 INJECTION, SOLUTION INTRAMUSCULAR at 17:43

## 2022-05-27 RX ADMIN — SODIUM CHLORIDE, POTASSIUM CHLORIDE, SODIUM LACTATE AND CALCIUM CHLORIDE: 600; 310; 30; 20 INJECTION, SOLUTION INTRAVENOUS at 16:52

## 2022-05-27 ASSESSMENT — ACTIVITIES OF DAILY LIVING (ADL)
ADLS_ACUITY_SCORE: 18
ADLS_ACUITY_SCORE: 35

## 2022-05-27 NOTE — TELEPHONE ENCOUNTER
Forms completed and faxed to number provided.Copy placed in MJJ's bin at the front nurse station in . Original faxed to [employer] scanning.

## 2022-05-27 NOTE — PROVIDER NOTIFICATION
05/27/22 1546   Provider Notification   Provider Name/Title LUCIA DURAN   Method of Notification At Bedside   Request Evaluate in Person   Notification Reason SVE   SVE with cervical change & BBOW.  Patient declined epidural & AROM at this time.  Fetal monitor strip reviewed.  Intermittent lates with minimal variablity noted.

## 2022-05-27 NOTE — PROVIDER NOTIFICATION
05/27/22 1720   Provider Notification   Provider Name/Title HALINA Mcrae   Method of Notification At Bedside   Request Attend Delivery   SROM clear

## 2022-05-27 NOTE — PLAN OF CARE
20 g IV was started on the left forearm & flushed 18 ml fluid & no evidence of infiltration noted. Patient stated that the site hurt so IV was stopped & vascular access was called.

## 2022-05-27 NOTE — PROGRESS NOTES
REEM PROGRESS NOTE    SUBJECTIVE:  Starting to feel more uncomfortable with contractions. Requesting cervical exam    OBJECTIVE:  /55   Temp 98.2  F (36.8  C) (Oral)   LMP 2021     Fetal heart tones: Baseline 158   Variability: moderate now, has had some periods of minimal variability  Accelerations: present  Decelerations: absent, a couple of late decels noted previously    Contractions: Pt is tia every 3-5 minutes, lasting 60 seconds and palpates moderate    Cervix: 6/ 100% / -2, Vtx  ROM: Bulging bag    Pitocin- 8 mu/min.  Antibiotics- none  Cervical ripening: N/A    ASSESSMENT:  IUP @ 42w0d active labor   GBS- negative  Post dates   PLAN:     Pain medication Epidural planned ~30 hours  Anticipate   Labor induction with Pitocin  reevaluate in 2-4 hours/PRN    LUCIA LEWIS CNM

## 2022-05-27 NOTE — PROGRESS NOTES
Epidural attempted however the patient offered little to no cooperation with positioning. Concern for harm from the procedure led me to abandon the epidural placement. Patient near complete and water broke while I was in the room. Discussed with HALINA Mcrae.    CYNDI Correa

## 2022-05-27 NOTE — PROGRESS NOTES
[facility] PROGRESS NOTE    SUBJECTIVE:  Starting to feel uncomfortable with contractions    OBJECTIVE:  /64   Temp 98.2  F (36.8  C) (Oral)   LMP 2021     Fetal heart tones: Baseline 156   Variability: moderate  Accelerations: present  Decelerations: absent now but two previous questionable lates noted by RN last hour    Contractions: Pt is tia every 3 minutes, lasting 50 seconds and palpates moderate    Cervix: 4/ 50%/ -2, Vtx  ROM: Bulging bag    Pitocin- advancing per protocol  Antibiotics- none  Cervical ripening: N/A    ASSESSMENT:  IUP @ 42w0d early labor   GBS- negative  Induction of labor for post dates   PLAN:     Pain medication Epidural when patient requests.  Anticipate   reevaluate in 2-4 hours/PRN    LUCIA LEWIS CNM

## 2022-05-27 NOTE — PLAN OF CARE
@ 42 weeks here for IOL-Postdates.  Patient states she has felt baby moving and denies LOF.  VTX by Leopolds - EFM applied & explained.  See Doc flow sheets for assessment.    Clementina Smith CNM at the bedside to check the patient cervix, discuss the POC & orders.  A bedside U/S was done to confirm presentation as vertex.

## 2022-05-27 NOTE — PROVIDER NOTIFICATION
05/27/22 1430   Provider Notification   Provider Name/Title LUCIA DURAN   Method of Notification At Bedside   Request Evaluate in Person   Provider in to see the patient but differed SVE

## 2022-05-27 NOTE — H&P
[facility] Labor Admission History & Physical    Karen Loya is a 27 year old  with an IUP at 42w0d  [place]om[place]; ,   Partner/support Person: Stephen  Language Barrier: Puerto [place]ican  Clinic: [facility]  Provider: LAXMI Sanchez CNM    Karen Loya is admitted to the Birthplace at [facility] on 2022 at 8:23 AM       History of present inllness/Chief Complaint:    Here with: induction of labor secondary to Post dates  Patient reports contractions are Occasional       Baby active Yes  Membranes are intact.  Bloody show No   Any changes with medical history since last prenatal visit No  Declines syphilis screening.  Not needed as recent negative screen    Obstetrical history  Estimated Date of Delivery: May 13, 2022 determined by LMP  Patient's last menstrual period was 2021.   Dating U/S: 22    Fetal anatomic survey: Normal  Placenta: Posterior    PRENATAL COURSE  Prenatal care began at 21 wks gestation for a total of 11 prenatal visits.  Total wt gain none; There is no height or weight on file to calculate BMI.  Prenatal Blood Pressure: WNL  Prenatal course was   complicated by    Patient Active Problem List    Diagnosis Date Noted     Post-dates pregnancy 2022     Priority: Medium     Poor fetal growth affecting management of mother in third trimester, single or unspecified fetus 2022     Priority: Medium     EFW 29% w/ AC 6% @ 37w.   F/U US 2022 :       Anemia of mother in pregnancy, antepartum, third trimester 2022     Priority: Medium     Vitamin D deficiency 2021     Priority: Medium     Vit D at IOB 9  Advised 1,000 international unit(s) Vit D3 daily       Obesity affecting pregnancy in third trimester 12/15/2021     Priority: Medium     BMI 38 pre preg  Ref to MFM for level II       History of third degree perineal laceration 12/15/2021     Priority: Medium     x2  Provide counseling and offer MD referral [ ]        Supervision of high risk pregnancy  in third trimester 2021     Priority: Medium       Clinic/Hospital: [facility]  Partner Name: Stephen  Ultrasound predicts sex: boy  Childrens names/ages: 20 month old boy  Previous labor experiences: IOL for preeclampsia,  of 7lb8oz boy   Waterbirth (interest, declined, ineligible): n/a  Level of education/occupation:   Pertinent History: Obesity, belkis. Macrosomia. Previous infant was suspected to be LGA and was AGA   PP contraception: Undecided   Hx PPD/Depression/Anxiety: Denies   Peds provider:    Plans for labor pain management: Epidural   Plans for post partum recovery/time off:  Feeding preference: breast   Breast pump:   Car seat:  Circumcision:  Discussed 2 week visit:  Tdap:   Flu:         History of pre-eclampsia in prior pregnancy, currently pregnant 2020     Priority: Medium     Baby ASA during pregnancy       Tdap: Declined  Rhogam: NA    Patient Active Problem List   Diagnosis     History of pre-eclampsia in prior pregnancy, currently pregnant     Supervision of high risk pregnancy in third trimester     Obesity affecting pregnancy in third trimester     History of third degree perineal laceration     Vitamin D deficiency     Anemia of mother in pregnancy, antepartum, third trimester     Poor fetal growth affecting management of mother in third trimester, single or unspecified fetus     Post-dates pregnancy       HISTORY  No Known Allergies  Past Medical History:   Diagnosis Date     Nausea/vomiting in pregnancy 2020     No pertinent past medical history 2020     Post term pregnancy, antepartum 2021     Post-dates pregnancy 2021     Supervision of high risk pregnancy in third trimester 3/17/2021     Clinic/Hospital: [facility] Partner Name: Stephen Ultrasound predicts sex: boy Childrens names/ages: 20 month old boy Previous labor experiences: IOL for preeclampsia,  of 7lb8oz boy  Waterbirth (interest, declined, ineligible): n/a Level of education/occupation:  Pertinent  History: Obesity, belkis. Macrosomia. Previous infant was suspected to be LGA and was AGA  PP contraception: Undecided  Hx PPD/Dep     Past Surgical History:   Procedure Laterality Date     NO HISTORY OF SURGERY  2020     History reviewed. No pertinent family history.  Social History     Tobacco Use     Smoking status: Never Smoker     Smokeless tobacco: Never Used   Substance Use Topics     Alcohol use: Never     OB History    Para Term  AB Living   3 2 2 0 0 2   SAB IAB Ectopic Multiple Live Births   0 0 0 0 2      # Outcome Date GA Lbr Robert/2nd Weight Sex Delivery Anes PTL Lv   3 Current            2 Term 04/15/21 41w2d 05:17 / 02:04 3.82 kg (8 lb 6.8 oz) M Vag-Spont EPI N MARLA      Name: KATEY DIEZ-FRANKIE      Apgar1: 8  Apgar5: 9   1 Term 19 40w0d  3.175 kg (7 lb) M Vag-Spont   MARLA      Birth Comments: partial 3rd degree lac, IOL for GHTN       LABS:  Lab Results   Component Value Date    ABO O 2021    ABO O 2021    RH Pos 2021    RH Pos 2021    AS Negative 2022    HGB 8.5 (L) 2022    HEPBANG Nonreactive 2021    CHPCRT Negative 10/21/2020    GCPCRT Negative 10/21/2020       GBS Status:   Lab Results   Component Value Date    GBS Positive (A) 2021     Rubella: Immune    HIV: Non-Reactive   Platelets:  205  1hr GCT:  154, passed GTT    ROS   Pt is alert and oriented  Pt denies significant constitutional symptoms including fever and/or malaise.    Pt denies significant respiratory, cardiovacular, GI, or muscular/skeletal complaints.    Neuro: Denies HA and visual changes  Muscoloskeletal: Denies except for discomforts r/t pregnancy     PHYSICAL EXAM:  /64   Temp 98.2  F (36.8  C) (Oral)   LMP 2021   General appearance:  healthy, alert, active and no distress   Heart: RRR  Lungs: CTA bilaterally, normal respiratory effort  Abdomen: gravid, single vertex fetus, non-tender, EFW 7.5 lbs.   Legs: reflexes 2+ bilaterally, no clonus, no  edema     Contractions: Pt is tia in an irregular pattern    Fetal heart tones: Baseline 158   Variability: moderate   Accelerations: absent  Decelerations: present    NST: reactive    Cervix: 1.5/ 50/ Mid/ soft/ -2, Vtx  Bloody show: no  Membranes:  Intact    ASSESSMENT:  27 year old  with santos IUP 42w0d for induction of labor.  Indication post dates  NST reactive  GBS negative and membranes intact    PLAN:  Routine CNM care  Labs ordered: Hemoglobin and type and screen  Teaching done r/t comfort measures, pain management options, and labor processes.  Admit - see IP orders  Labor induction with Pitocin  Plans for epidural when more uncomfortable.  Anticipate     LUCIA LEWIS CNM

## 2022-05-27 NOTE — PROVIDER NOTIFICATION
05/27/22 1225   Provider Notification   Provider Name/Title LUCIA DURAN   Method of Notification At Bedside   Request Evaluate in Person   SVE & POC discussed.  Patient requested the delay of AROM.  Patient aware she can have an epidural at any time.

## 2022-05-27 NOTE — L&D DELIVERY NOTE
OB Vaginal Delivery Note    Karen Loya MRN# [id number]   Age: 27 year old YOB: 1995       GA: 42w0d  GP:   Labor Complications: None   EBL:   mL  Delivery QBL:    Delivery Type: Vaginal, Spontaneous   ROM to Delivery Time: (Delivered) Minutes: 18  [place] Weight: 3.68 kg (8 lb 1.8 oz)    1 Minute 5 Minute 10 Minute   Apgar Totals: 9   9        LUCIA LEWIS     Delivery Note    IUP at 42 weeks gestation delivered on 2022.     delivery of a viable  Female infant.  Apgars of 9 at 1 minute and 9 at 5 minutes.  Labor was induced.    Medications administered  in labor:  Pain Rx none; Antibiotics No; Other TXA given for prophylaxis to help prevent bleeding. Pitocin given for active management of third stage  Perineum: 2nd degree, delayed cord clamping Yes  Placenta-mechanism: spontaneous, intact,  with a 3 vessel cord. IV oxytocin was given.  Estimated Blood Loss:  300cc's  Complications of pregnancy, labor and delivery: None    LAXMI Sanchez, CNM    Delivery Details:  Karen Loya, a 27 year old  female delivered a viable infant with apgars of 9  and 9 . Patient was fully dilated and pushing after   hours   minutes in active labor. Delivery was via vaginal, spontaneous  to a sterile field under   anesthesia. Infant delivered in vertex  left  occiput  anterior  position. Anterior and posterior shoulders delivered without difficulty. The cord was clamped, cut twice and   were noted. Cord blood was obtained in routine fashion with the following disposition: lab .      Cord complications: none   Placenta delivered at 2022  5:49 PM . Placental disposition was Hospital disposal . Fundal massage performed and fundus found to be firm.     Episiotomy: none    Perineum, vagina, cervix were inspected, and the following lacerations were noted:   Perineal lacerations: 2nd                Any lacerations were repaired in the usual fashion using 3.0 vicryl.    Excellent hemostasis  "was noted. Needle count correct. Infant and patient in delivery room in good and stable condition.        Marybeth Loya-Karen [[id number]]    Labor Event Times    Labor onset date: 22 Onset time: 12:23 PM      Rupture date/time: 22 1718   Rupture type: Spontaneous rupture of membranes occuring during spontaneous labor or augmentation  Fluid color: Clear     Induction: Oxytocin  Induction date/time:     Cervical ripening date/time:     Indications for induction: Post-term Gestation     Augmentation: None     Delivery/Placenta Date and Time    Delivery Date: 22 Delivery Time:  5:36 PM   Placenta Date/Time: 2022  5:49 PM  Delivering clinician: Clementina Smith CNM   Other personnel present at delivery:  Provider Role   Clementina Smith CNM Certified Nurse Midwife         Vaginal Counts     Initial count performed by 2 team members:  Two Team Members   ALLEN Casper CNM       Needles Suture Needles Sponges (RETIRED) Instruments   Initial counts 2  5    Added to count  1     Relief counts       Final counts 2 1 5          Placed during labor Accounted for at the end of labor   FSE NA    IUPC NA    Cervidil NA               Final count performed by 2 team members:  Two Team Members   Rasta PANDEY, ALLEN Mcrae CNM      Final count correct?: Yes     Apgars    Living status: Living   1 Minute 5 Minute 10 Minute 15 Minute 20 Minute   Skin color: 1  1       Heart rate: 2  2       Reflex irritability: 2  2       Muscle tone: 2  2       Respiratory effort: 2  2       Total: 9  9       Apgars assigned by: RASTA PANEDY RN     Cord    Cord Complications: None               Cord Blood Disposition: Lab    Delayed cord clamping?: Yes    Cord Clamping Delay (seconds): >120 seconds    Stem cell collection?: No        Measurements    Weight: 8 lb 1.8 oz Length: 1' 9\"   Head circumference: 34.3 cm       Labor Events and Shoulder Dystocia    Fetal Tracing Prior to Delivery: Category 2  Shoulder dystocia " present?: Neg     Delivery (Maternal) (Provider to Complete) ([id number])    Episiotomy: None  Perineal lacerations: 2nd Repaired?: Yes   Repair suture: 3-0 Vicryl  Genital tract inspection done: Pos     Blood Loss  Mother: Karen Loya #[id number]   Start of Mother's Information    Delivery Blood Loss  05/27/22 1223 - 05/27/22 1821    None           End of Mother's Information  Mother: Karen Loya #[id number]          Delivery - Provider to Complete ([id number])    Delivering clinician: Lucia Smith CNM CNM Care: Exclusive [facility] care in labor  Attempted Delivery Types (Choose all that apply): Spontaneous Vaginal Delivery  Delivery Type (Choose the 1 that will go to the Birth History): Vaginal, Spontaneous                   Other personnel:  Provider Role   Lucia Smith CNM Certified Nurse Midwife                Placenta    Date/Time: 5/27/2022  5:49 PM  Removal: Spontaneous  Disposition: Hospital disposal           Presentation and Position    Presentation: Vertex    Position: Left Occiput Anterior                 LUCIA SMITH CNM

## 2022-05-27 NOTE — PROVIDER NOTIFICATION
Dr Correa at the bedside to do an epidural.  Patient unable to stay still for epidural.  After MDA attempted to place the catheter it was decided that he would suspend attempts.

## 2022-05-27 NOTE — PROVIDER NOTIFICATION
05/27/22 1652   Provider Notification   Provider Name/Title dilma rodriguez   Method of Notification Electronic Page   CNM paged to SVE as patient is transitioning & occasionally grunting.  Patient has requested epidural & MDA has been paged.    EFM is repeatedly being moved by patient & repositioned by author

## 2022-05-27 NOTE — PROGRESS NOTES
[facility] PROGRESS NOTE    SUBJECTIVE:  Wanting her epidural now. Contractions are more intense    OBJECTIVE:  /55   Temp 98.2  F (36.8  C) (Oral)   LMP 2021     Fetal heart tones: Baseline 158   Variability: moderate  Accelerations: present  Decelerations: absent    Contractions: Pt is tia every 3 minutes, lasting 60 seconds and palpates strong    Cervix: 8/ 100% / -2, Vtx  ROM: not ruptured    Pitocin- 8 mu/min.  Antibiotics- none  Cervical ripening: N/A    ASSESSMENT:  IUP @ 42w0d active labor   GBS- negative  Post dates   PLAN:     Pain medication Epidural now  Anticipate   Labor induction with Pitocin    LUCIA LEWIS CNM

## 2022-05-28 LAB — HGB BLD-MCNC: 7.4 G/DL (ref 11.7–15.7)

## 2022-05-28 PROCEDURE — 85018 HEMOGLOBIN: CPT | Performed by: ADVANCED PRACTICE MIDWIFE

## 2022-05-28 PROCEDURE — 36415 COLL VENOUS BLD VENIPUNCTURE: CPT | Performed by: ADVANCED PRACTICE MIDWIFE

## 2022-05-28 PROCEDURE — 120N000001 HC R&B MED SURG/OB

## 2022-05-28 PROCEDURE — 250N000013 HC RX MED GY IP 250 OP 250 PS 637: Performed by: ADVANCED PRACTICE MIDWIFE

## 2022-05-28 RX ADMIN — BENZOCAINE: 11.4 AEROSOL, SPRAY TOPICAL at 20:11

## 2022-05-28 ASSESSMENT — ACTIVITIES OF DAILY LIVING (ADL)
ADLS_ACUITY_SCORE: 18
TOILETING_ISSUES: NO
WALKING_OR_CLIMBING_STAIRS_DIFFICULTY: NO
CONCENTRATING,_REMEMBERING_OR_MAKING_DECISIONS_DIFFICULTY: NO
ADLS_ACUITY_SCORE: 18
DRESSING/BATHING_DIFFICULTY: NO
DIFFICULTY_EATING/SWALLOWING: NO
CHANGE_IN_FUNCTIONAL_STATUS_SINCE_ONSET_OF_CURRENT_ILLNESS/INJURY: NO
ADLS_ACUITY_SCORE: 18
HEARING_DIFFICULTY_OR_DEAF: NO
ADLS_ACUITY_SCORE: 18
ADLS_ACUITY_SCORE: 18
DOING_ERRANDS_INDEPENDENTLY_DIFFICULTY: NO
ADLS_ACUITY_SCORE: 18
WEAR_GLASSES_OR_BLIND: NO
ADLS_ACUITY_SCORE: 18
FALL_HISTORY_WITHIN_LAST_SIX_MONTHS: NO
ADLS_ACUITY_SCORE: 18
ADLS_ACUITY_SCORE: 18
DIFFICULTY_COMMUNICATING: NO
ADLS_ACUITY_SCORE: 18

## 2022-05-28 NOTE — PROGRESS NOTES
Cedric Mckenzie CNM Progress Note: Postpartum Day #1    May 28, 2022  9:35 AM    SUBJECTIVE:  Patient is stable and is tolerating acitivity well  Baby is rooming in  Complications since 2 hours post delivery: None  Pain is well controlled.  Patient is taking pain medications.  Breastfeeding status:initiated   Elimination:  She is voiding without difficulty.  She has not had a bowel movement  Denies heavy bleeding and passing large clots.  Feels ok about birth experience; no complaints at this time.    OBJECTIVE:  /67   Pulse 87   Temp 98.4  F (36.9  C) (Oral)   Resp 14   LMP 2021   Breastfeeding Unknown     Constitutional: healthy, alert and no distress    Breasts: Currently breastfeeding    Fundus: Uterine fundus is firm, non-tender and at 2 below the level of the umbilicus    Perineum: Perineum suture intact and/or well approximated, minimal swelling    Lochia: Lochia is appropriate for the duration of time since delivery.     ASSESSMENT:  PPD #1    Doing well.  No excessive bleeding  Pain well-controlled.  Hemoglobin   Date Value Ref Range Status   2022 7.4 (L) 11.7 - 15.7 g/dL Final   2021 9.1 (L) 11.7 - 15.7 g/dL Final   ]      PLAN:  Continue routine care  Reviewed breastfeeding  Anticipated discharge on 22 as pt desires to stay        LAXMI Del Cid CNM

## 2022-05-28 NOTE — PLAN OF CARE
Pt meeting expected outcomes. Vitals stable. Fundus firm and midline. Denies difficulty voiding, encouraged to void frequently. Has mild cramping uterine pain, declined medication but offered several times. Independent with self and infant cares. Breast and formula feeding infant per patients preference. Anticipated discharge home tomorrow.

## 2022-05-28 NOTE — PLAN OF CARE
Pt arrived to post partum around 2000, with baby in arms and significant other with belongings. Oriented to room/call light and reviewed the ABC's of safe sleep. Pt is able to answer basic questions but significant other interprets for her.  Pt is bonding well with baby girl- Nicolasa. Breastfeeding is going well per pt. Fundus is firm, midline and 1 cm below umbilicus. Lochia is light, rubra and pt did have one large clot a little bigger than the size of a golf ball, fundus remained firm and no active bleeding present with assessment. VSS. Voiding and passing gas. Ambulating independently.Tolerating perineal pain well with, tylenol- rating a 4/10. No circumcision at this point, will discuss this with their pediatrician.

## 2022-05-28 NOTE — PLAN OF CARE
.Data: Karen Loya transferred to postpartum via wheelchair at 2020. Baby transferred via parent's arms.  Action: Receiving unit notified of transfer: Yes. Patient and family notified of room change. Report given to ALLEN Bradley at 2020. Belongings sent to receiving unit. Accompanied by Registered Nurse. Oriented patient to surroundings. Call light within reach. ID bands double-checked with receiving RN.  Response: Patient tolerated transfer and is stable.  Patients mobililty level scored using the bedside mobility assistance tool (BMAT). Patient is at a mobility level test number: 4. Mobility equipment used: none required. Required assist of 0 staff members. Further use of BMAT scoring not required.

## 2022-05-29 VITALS
SYSTOLIC BLOOD PRESSURE: 119 MMHG | RESPIRATION RATE: 18 BRPM | TEMPERATURE: 98.7 F | HEART RATE: 88 BPM | DIASTOLIC BLOOD PRESSURE: 68 MMHG

## 2022-05-29 PROCEDURE — 250N000013 HC RX MED GY IP 250 OP 250 PS 637: Performed by: ADVANCED PRACTICE MIDWIFE

## 2022-05-29 RX ORDER — ACETAMINOPHEN 325 MG/1
650 TABLET ORAL EVERY 4 HOURS PRN
Qty: 60 TABLET | Refills: 0 | Status: ON HOLD | COMMUNITY
Start: 2022-05-29 | End: 2022-06-02

## 2022-05-29 RX ORDER — IBUPROFEN 800 MG/1
800 TABLET, FILM COATED ORAL EVERY 6 HOURS PRN
Qty: 20 TABLET | Refills: 0 | Status: ON HOLD | COMMUNITY
Start: 2022-05-29 | End: 2022-06-02

## 2022-05-29 RX ORDER — DOCUSATE SODIUM 100 MG/1
100 CAPSULE, LIQUID FILLED ORAL DAILY
Qty: 90 CAPSULE | Refills: 1 | COMMUNITY
Start: 2022-05-29 | End: 2023-01-02

## 2022-05-29 RX ADMIN — DOCUSATE SODIUM 100 MG: 100 CAPSULE, LIQUID FILLED ORAL at 10:13

## 2022-05-29 ASSESSMENT — ACTIVITIES OF DAILY LIVING (ADL)
DEPENDENT_IADLS:: INDEPENDENT
ADLS_ACUITY_SCORE: 18

## 2022-05-29 NOTE — DISCHARGE INSTRUCTIONS
Vaginal Delivery Discharge Instructions: [facility]  Waxq[place]:   Waydiiso qoyska iyo saaxibadaa inay ku caawiyaan markaad u baahantahay.  Waxba diaz kor saarin ama diaz galin farjigaaga ilaa uu dhakhtarkaagu ansixiyo.  Si fudud u qaado dhowrka asbuuc e xiga si aad u ogalaato in jirkaagu beau kabto. Waxaad samayn kartaa howl kasta oo aad rabto ilaa meeshaas laga gaarayo.  Gaadhi diaz wadin markaad qaadanayso  kaniiniyada xanuunka ee dhkhatarku kuu qoray . waxaad gaadhi wadi kartaa haddii aad qaadanayso kaniiniyada xanuunka ee koontarka.    Wac bixiyahaaga daryeelka caafimaaad haddii aad qabtid mid ka mid ah calaamadahan beau socda:  Haddii suufka dhiigga nuuga uu ku buuxsamo 1 saac gudihiis, ama aad aragto xinjiro dhiig ah oo ka wayn kubadda golafka.   Dhiig soconaya wax kabadan 6 asbuuc.  Haddii aad qabto dheecaan farjiga ka imaanaya oo  si xun u uraya.   [facility] 100.4  F (38  C) am aka sii saraysa (heerkulka laga qaaday carabka hoostiiisa), oo qarqaryo leh ama aan lahayn   Xanuun, nabar, majiirid daran oo aad ka dareeto qaybta hoose ee ubucda.  Xanuun sii kordya, barar, guduudasho ama dheecaan ka dhiimaya meesha la tolay ee qaliinka.  Kaadi badan oo dagdag ah oo markasta ku qabanaysa , ama gubasho aad dareento markaad kaajayso.  Guduudasho, barar, ama xanuun aad ka dareento xididada lugta.  Dhibaato kaa haysata naas nuujinta, ama guduudasho ama xanuun naaska ah.  Xanuun sii kordhaya ama aan ka dhamaanayn meesha la tolay ama danqashada dilaaca.  Lalabbo ama matag.  Xabad xanuun iyo qufac ama naqaska oo kugu ciaragaya.  Dhibaato ay la socoto murugo, cecilia, aa mayda.   Haddii aad qabtid wax mayda rodriguez oo ku saabsan inaad waxyeelayso naftaada ama ilmaha, wac ciarakhtalarry leslee maribethiiba.   Haddii aad qabto taylor aalo amomer nettlesa rufina noqoto kadib.    Gacmahaagga nadiifi:  Markasta dhaqa gacahaaga ka hor inta aadan taaban farjiga agagaarkiisa  iyo meesha la tolay. Johnny waxay caawiniaysaa inuu yaraado infaksrudyku. Hdii  radhaagu ericka monge tirtircristina lopez nadiiking pepper. Traceelizzyjenaro nadiifi ooo jar.      Vaginal Delivery Discharge Instructions  Activity:   Ask family and friends for help when you need it.  Do not place anything in your vagina until your doctor approves.  Take it easy for the next few weeks to allow your body to recover. You may do any activities you feel up to at that point.  Do not drive while taking pain pills prescribed by your doctor. You may drive if taking over-the-counter pain pills.    Call your health care provider if you have any of these symptoms:  You soak a sanitary pad with blood within 1 hour, or you see blood clots larger than a golf ball.  Bleeding that lasts more than 6 weeks.  You have vaginal discharge that smells bad.   A fever of 100.4  F (38  C) or higher (temperature taken under your tongue), with or without chills   Severe, pain, cramping or tenderness in your lower belly area.  Increased pain, swelling, redness or fluid around your stitches.  A more frequent or urgent need to urinate (pee), or it burns when you pee.  Redness, swelling or pain around a vein in your leg.  Problems breastfeeding, or a red or painful area on your breast.  Pain that increases or does not go away from an episiotomy or perineal tear.  Nausea and vomiting.  Chest pain and cough or are gasping for air.  Problems coping with sadness, anxiety, or depression.   If you have any concerns about hurting yourself or the baby, call your doctor right away.    You have questions or concerns after you return home.    Keep your hands clean:  Always wash your hands before touching your perineal area and stitches.  This helps reduce your risk of infection.  If your hands aren t dirty, you may use an alcohol hand-rub to clean your hands. Keep your nails clean and short.

## 2022-05-29 NOTE — PLAN OF CARE
Pt is bonding well with baby girl- Nicolasa. Formula feeding but plans to breastfeed at home. Fundus is firm, midline and 2 cm below umbilicus. Lochia is light, rubra and pt denies having any clots. VSS. Voiding and passing gas. Ambulating independently, needs encouragement.Tolerating cramping pain with tucks pads and benzocaine spray- rating a 7/10.

## 2022-05-29 NOTE — PLAN OF CARE
"Vital signs stable. Pt stated she passed a \"larger clot in toilet this morning but flushed\". When RN attempted to check pts uterus, pt refused. Encouraged pt to call out if she sees another clot or feels a gush of blood. Midwife was able to perform a fundal check on pt when assesing and stated pts fundus was firm and 2 below umbilicus. Pt states she is in pain but does not want to take Tylenol or Ibu. She does know she can have it if desires. She is also using ice packs, benzocaine spray, and tucks pads. Pts nipples are also sore, she is using Mothers Love nipple cream. Pt received a breast pump for home. Up ad/chon. Voiding w/o difficulty. Breast and bottle feeding  per parental choice. Pt received complete discharge paperwork. Discharge outcomes on care plan met. Pt states understanding and comfort with self care and follow up care. Pt had no further questions at the time of discharge and no unmet needs were identified. ID bands double checked and verified with parents and . Electronic security band removed from . Pt discharged mid day.   "

## 2022-05-29 NOTE — DISCHARGE SUMMARY
CNM Postpartum Discharge Note    SIGNIFICANT PROBLEMS:  Patient Active Problem List    Diagnosis Date Noted     Post-dates pregnancy 2022     Priority: Medium     Poor fetal growth affecting management of mother in third trimester, single or unspecified fetus 2022     Priority: Medium     EFW 29% w/ AC 6% @ 37w.   F/U US 2022 :       Anemia of mother in pregnancy, antepartum, third trimester 2022     Priority: Medium     Vitamin D deficiency 2021     Priority: Medium     Vit D at IOB 9  Advised 1,000 international unit(s) Vit D3 daily       Obesity affecting pregnancy in third trimester 12/15/2021     Priority: Medium     BMI 38 pre preg  Ref to MFM for level II       History of third degree perineal laceration 12/15/2021     Priority: Medium     x2  Provide counseling and offer MD referral [ ]        Supervision of high risk pregnancy in third trimester 2021     Priority: Medium       Clinic/Hospital: [facility]  Partner Name: Stephen  Ultrasound predicts sex: boy  Childrens names/ages: 20 month old boy  Previous labor experiences: IOL for preeclampsia,  of 7lb8oz boy   Waterbirth (interest, declined, ineligible): n/a  Level of education/occupation:   Pertinent History: Obesity, belkis. Macrosomia. Previous infant was suspected to be LGA and was AGA   PP contraception: Undecided   Hx PPD/Depression/Anxiety: Denies   Peds provider:    Plans for labor pain management: Epidural   Plans for post partum recovery/time off:  Feeding preference: breast   Breast pump:   Car seat:  Circumcision:  Discussed 2 week visit:  Tdap:   Flu:         History of pre-eclampsia in prior pregnancy, currently pregnant 2020     Priority: Medium     Baby ASA during pregnancy           SUBJECTIVE:  Patient is stable and is tolerating acitivity well  Baby is rooming in  Complications since 2 hours post delivery: None  Pain is well controlled.  Patient is not taking pain medications; prefers binder and  heat.  Breastfeeding status:latching difficulties and breastfeeding with formula supplementation per patient request   Elimination:  She is voiding without difficulty.  She has not had a bowel movement  Desired contraception Unsure  Denies heavy bleeding and passing large clots.    INTERVAL HISTORY:  /78 (BP Location: Left arm, Patient Position: Semi-Parker's, Cuff Size: Adult Regular)   Pulse 94   Temp 98.9  F (37.2  C) (Oral)   Resp 16   LMP 08/19/2021   Breastfeeding Unknown     Constitutional: healthy, alert and no distress    Breasts: Currently breastfeeding    Fundus: Uterine fundus is firm, non-tender and at 3 below the level of the umbilicus    Perineum: Perineum: sutures intact and/or well approximated, minimal swelling    Lochia: Lochia is appropriate for the duration of time since delivery.     Postpartum hemoglobin   Hemoglobin   Date Value Ref Range Status   05/28/2022 7.4 (L) 11.7 - 15.7 g/dL Final   05/12/2021 9.1 (L) 11.7 - 15.7 g/dL Final     Blood type   Lab Results   Component Value Date    ABO O 04/14/2021    ABO O 04/14/2021       Lab Results   Component Value Date    RH Pos 04/14/2021    RH Pos 04/14/2021     Rubella status No results found for: RUBELLAABIGG  History of depression:  no    ASSESSMENT/PLAN:  Normal postpartum course  Stable Post-partum day #2  Complications:anemic, plan for iron supplementation and breastfeeding difficulties  Postpartum warning s/s reviewed, including bleeding/clots, fever, mastitis & thromboemboli   Exercise, diet and rest reviewed  PP Kegels/crnoman reviewed  Continue prenatal vitamins while breastfeeding  Birthcontrol planned:Undecided. Fertility and contraception options reviewed.  Educated on postpartum blues and postpartum depression warnings signs/symptoms  2 week phone call follow-up to be done by delivering CNM  Follow-up in 6 weeks with CNMs at [facility]  Plan d/c home today    Current Discharge Medication List      START taking  these medications    Details   acetaminophen (TYLENOL) 325 MG tablet Take 2 tablets (650 mg) by mouth every 4 hours as needed for mild pain or fever (greater than or equal to 38  C /100.4  F (oral) or 38.5  C/ 101.4  F (core).)  Qty: 60 tablet, Refills: 0    Associated Diagnoses:  (normal spontaneous vaginal delivery)      docusate sodium (COLACE) 100 MG capsule Take 1 capsule (100 mg) by mouth daily  Qty: 90 capsule, Refills: 1    Associated Diagnoses:  (normal spontaneous vaginal delivery)      ibuprofen (ADVIL/MOTRIN) 800 MG tablet Take 1 tablet (800 mg) by mouth every 6 hours as needed for other (cramping)  Qty: 20 tablet, Refills: 0    Associated Diagnoses:  (normal spontaneous vaginal delivery)         CONTINUE these medications which have NOT CHANGED    Details   cyanocobalamin (VITAMIN B-12) 1000 MCG tablet Take 1 tablet (1,000 mcg) by mouth daily  Qty: 90 tablet, Refills: 3    Associated Diagnoses: Anemia affecting pregnancy in second trimester      ferrous sulfate (FEROSUL) 325 (65 Fe) MG tablet Take 1 tablet (325 mg) by mouth every other day  Qty: 30 tablet, Refills: 4    Associated Diagnoses: Anemia affecting pregnancy in second trimester      Prenatal Vit-Fe Fumarate-FA (PRENATAL MULTIVITAMIN W/IRON) 27-0.8 MG tablet Take 1 tablet by mouth daily  Qty: 90 tablet, Refills: 3    Associated Diagnoses: Encounter for supervision of other normal pregnancy in second trimester      vitamin C (ASCORBIC ACID) 250 MG tablet Take 1 tablet (250 mg) by mouth daily  Qty: 90 tablet, Refills: 3    Associated Diagnoses: Anemia affecting pregnancy in second trimester      VITAMIN D3 25 MCG (1000 UT) tablet Take 1 tablet by mouth daily         STOP taking these medications       aspirin (ASA) 81 MG chewable tablet Comments:   Reason for Stopping:               LAXMI Del Cid CNM

## 2022-06-01 ENCOUNTER — TELEPHONE (OUTPATIENT)
Dept: OBGYN | Facility: CLINIC | Age: 27
End: 2022-06-01

## 2022-06-01 ENCOUNTER — HOSPITAL ENCOUNTER (INPATIENT)
Facility: CLINIC | Age: 27
LOS: 1 days | Discharge: HOME | End: 2022-06-02
Attending: EMERGENCY MEDICINE | Admitting: ADVANCED PRACTICE MIDWIFE
Payer: COMMERCIAL

## 2022-06-01 ENCOUNTER — APPOINTMENT (OUTPATIENT)
Dept: ULTRASOUND IMAGING | Facility: CLINIC | Age: 27
End: 2022-06-01
Attending: EMERGENCY MEDICINE
Payer: COMMERCIAL

## 2022-06-01 ENCOUNTER — APPOINTMENT (OUTPATIENT)
Dept: INTERPRETER SERVICES | Facility: CLINIC | Age: 27
End: 2022-06-01
Payer: COMMERCIAL

## 2022-06-01 DIAGNOSIS — R52 POSTPARTUM PAIN: Primary | ICD-10-CM

## 2022-06-01 LAB
ANION GAP SERPL CALCULATED.3IONS-SCNC: 5 MMOL/L (ref 3–14)
BASOPHILS # BLD AUTO: 0 10E3/UL (ref 0–0.2)
BASOPHILS NFR BLD AUTO: 0 %
BUN SERPL-MCNC: 9 MG/DL (ref 7–30)
CALCIUM SERPL-MCNC: 8.6 MG/DL (ref 8.5–10.1)
CHLORIDE BLD-SCNC: 107 MMOL/L (ref 94–109)
CO2 SERPL-SCNC: 27 MMOL/L (ref 20–32)
CREAT SERPL-MCNC: 0.57 MG/DL (ref 0.52–1.04)
EOSINOPHIL # BLD AUTO: 0.3 10E3/UL (ref 0–0.7)
EOSINOPHIL NFR BLD AUTO: 4 %
ERYTHROCYTE [DISTWIDTH] IN BLOOD BY AUTOMATED COUNT: 21 % (ref 10–15)
GFR SERPL CREATININE-BSD FRML MDRD: >90 ML/MIN/1.73M2
GLUCOSE BLD-MCNC: 100 MG/DL (ref 70–99)
HCT VFR BLD AUTO: 26.9 % (ref 35–47)
HGB BLD-MCNC: 7.7 G/DL (ref 11.7–15.7)
HOLD SPECIMEN: NORMAL
IMM GRANULOCYTES # BLD: 0 10E3/UL
IMM GRANULOCYTES NFR BLD: 1 %
LYMPHOCYTES # BLD AUTO: 1.8 10E3/UL (ref 0.8–5.3)
LYMPHOCYTES NFR BLD AUTO: 24 %
MCH RBC QN AUTO: 20 PG (ref 26.5–33)
MCHC RBC AUTO-ENTMCNC: 28.6 G/DL (ref 31.5–36.5)
MCV RBC AUTO: 70 FL (ref 78–100)
MONOCYTES # BLD AUTO: 0.7 10E3/UL (ref 0–1.3)
MONOCYTES NFR BLD AUTO: 10 %
NEUTROPHILS # BLD AUTO: 4.9 10E3/UL (ref 1.6–8.3)
NEUTROPHILS NFR BLD AUTO: 61 %
NRBC # BLD AUTO: 0 10E3/UL
NRBC BLD AUTO-RTO: 0 /100
PLATELET # BLD AUTO: 340 10E3/UL (ref 150–450)
POTASSIUM BLD-SCNC: 3.5 MMOL/L (ref 3.4–5.3)
RBC # BLD AUTO: 3.85 10E6/UL (ref 3.8–5.2)
SODIUM SERPL-SCNC: 139 MMOL/L (ref 133–144)
WBC # BLD AUTO: 7.8 10E3/UL (ref 4–11)

## 2022-06-01 PROCEDURE — 250N000011 HC RX IP 250 OP 636: Performed by: EMERGENCY MEDICINE

## 2022-06-01 PROCEDURE — 96376 TX/PRO/DX INJ SAME DRUG ADON: CPT

## 2022-06-01 PROCEDURE — 80048 BASIC METABOLIC PNL TOTAL CA: CPT | Performed by: EMERGENCY MEDICINE

## 2022-06-01 PROCEDURE — 96374 THER/PROPH/DIAG INJ IV PUSH: CPT

## 2022-06-01 PROCEDURE — 76856 US EXAM PELVIC COMPLETE: CPT

## 2022-06-01 PROCEDURE — 36415 COLL VENOUS BLD VENIPUNCTURE: CPT | Performed by: EMERGENCY MEDICINE

## 2022-06-01 PROCEDURE — 85025 COMPLETE CBC W/AUTO DIFF WBC: CPT | Performed by: EMERGENCY MEDICINE

## 2022-06-01 PROCEDURE — 120N000001 HC R&B MED SURG/OB

## 2022-06-01 PROCEDURE — C9803 HOPD COVID-19 SPEC COLLECT: HCPCS

## 2022-06-01 PROCEDURE — 99285 EMERGENCY DEPT VISIT HI MDM: CPT | Mod: 25

## 2022-06-01 RX ORDER — HYDROMORPHONE HYDROCHLORIDE 1 MG/ML
0.5 INJECTION, SOLUTION INTRAMUSCULAR; INTRAVENOUS; SUBCUTANEOUS ONCE
Status: COMPLETED | OUTPATIENT
Start: 2022-06-01 | End: 2022-06-01

## 2022-06-01 RX ORDER — KETOROLAC TROMETHAMINE 15 MG/ML
15 INJECTION, SOLUTION INTRAMUSCULAR; INTRAVENOUS ONCE
Status: DISCONTINUED | OUTPATIENT
Start: 2022-06-01 | End: 2022-06-01

## 2022-06-01 RX ADMIN — HYDROMORPHONE HYDROCHLORIDE 0.5 MG: 1 INJECTION, SOLUTION INTRAMUSCULAR; INTRAVENOUS; SUBCUTANEOUS at 20:04

## 2022-06-01 RX ADMIN — HYDROMORPHONE HYDROCHLORIDE 0.5 MG: 1 INJECTION, SOLUTION INTRAMUSCULAR; INTRAVENOUS; SUBCUTANEOUS at 22:35

## 2022-06-01 ASSESSMENT — ENCOUNTER SYMPTOMS
DIARRHEA: 0
FEVER: 0
CONSTIPATION: 0
NAUSEA: 1
DYSURIA: 0
ABDOMINAL PAIN: 1
FREQUENCY: 0
BLOOD IN STOOL: 0
COUGH: 0
CHILLS: 0
VOMITING: 0
ANAL BLEEDING: 0

## 2022-06-01 ASSESSMENT — ACTIVITIES OF DAILY LIVING (ADL): ADLS_ACUITY_SCORE: 35

## 2022-06-01 NOTE — TELEPHONE ENCOUNTER
"Called pt via [facility] .  She is scheduled today for an appt for vaginal bleeding.  Delivered on 5/27.    Pt states she is \"very ill\" and has been since delivery.  She has pain like labor pains, she cannot sleep or function due to this.  Bleeding is heavy, states she changes her pad every \"few seconds.\"  Very often throughout the day.    Advised to go to ER for evaluation.  Canceled clinic appt today.    She is agreeable and will go once she has a ride.    Kaylee Choe RN      "

## 2022-06-01 NOTE — TELEPHONE ENCOUNTER
Called the patient to see if they went to ED no answer. LVM in [facility] to let us know if she went to ED or has any questions/concerns.     LAXMI GREEN CNM

## 2022-06-01 NOTE — ED TRIAGE NOTES
Pt delivered child via vaginal delivery 4 days ago and presents to the ER for abdominal pain. Pt states the pain has been present since delivery and feels like labor pain. Vomiting when pain is excessive. Pt endorses small drops of vaginal bleeding since birth.     Triage Assessment     Row Name 06/01/22 1530       Triage Assessment (Adult)    Airway WDL WDL       Respiratory WDL    Respiratory WDL WDL

## 2022-06-02 VITALS
TEMPERATURE: 98.5 F | SYSTOLIC BLOOD PRESSURE: 110 MMHG | DIASTOLIC BLOOD PRESSURE: 64 MMHG | HEART RATE: 80 BPM | RESPIRATION RATE: 18 BRPM | OXYGEN SATURATION: 99 %

## 2022-06-02 DIAGNOSIS — D50.8 OTHER IRON DEFICIENCY ANEMIA: ICD-10-CM

## 2022-06-02 DIAGNOSIS — O09.93 SUPERVISION OF HIGH RISK PREGNANCY IN THIRD TRIMESTER: ICD-10-CM

## 2022-06-02 DIAGNOSIS — O99.013 ANEMIA OF MOTHER IN PREGNANCY, ANTEPARTUM, THIRD TRIMESTER: Primary | ICD-10-CM

## 2022-06-02 LAB
ALBUMIN UR-MCNC: 30 MG/DL
APPEARANCE UR: ABNORMAL
BACTERIA #/AREA URNS HPF: ABNORMAL /HPF
BASOPHILS # BLD AUTO: 0 10E3/UL (ref 0–0.2)
BASOPHILS NFR BLD AUTO: 0 %
BILIRUB UR QL STRIP: NEGATIVE
COLOR UR AUTO: YELLOW
EOSINOPHIL # BLD AUTO: 0.4 10E3/UL (ref 0–0.7)
EOSINOPHIL NFR BLD AUTO: 5 %
ERYTHROCYTE [DISTWIDTH] IN BLOOD BY AUTOMATED COUNT: 21.1 % (ref 10–15)
GLUCOSE UR STRIP-MCNC: NEGATIVE MG/DL
HCT VFR BLD AUTO: 27 % (ref 35–47)
HGB BLD-MCNC: 7.7 G/DL (ref 11.7–15.7)
HGB UR QL STRIP: ABNORMAL
HYALINE CASTS: 11 /LPF
IMM GRANULOCYTES # BLD: 0 10E3/UL
IMM GRANULOCYTES NFR BLD: 0 %
KETONES UR STRIP-MCNC: NEGATIVE MG/DL
LEUKOCYTE ESTERASE UR QL STRIP: ABNORMAL
LYMPHOCYTES # BLD AUTO: 2.1 10E3/UL (ref 0.8–5.3)
LYMPHOCYTES NFR BLD AUTO: 28 %
MCH RBC QN AUTO: 20.2 PG (ref 26.5–33)
MCHC RBC AUTO-ENTMCNC: 28.5 G/DL (ref 31.5–36.5)
MCV RBC AUTO: 71 FL (ref 78–100)
MONOCYTES # BLD AUTO: 0.8 10E3/UL (ref 0–1.3)
MONOCYTES NFR BLD AUTO: 11 %
MUCOUS THREADS #/AREA URNS LPF: PRESENT /LPF
NEUTROPHILS # BLD AUTO: 4.2 10E3/UL (ref 1.6–8.3)
NEUTROPHILS NFR BLD AUTO: 56 %
NITRATE UR QL: NEGATIVE
NRBC # BLD AUTO: 0 10E3/UL
NRBC BLD AUTO-RTO: 0 /100
PH UR STRIP: 6.5 [PH] (ref 5–7)
PLATELET # BLD AUTO: 335 10E3/UL (ref 150–450)
RBC # BLD AUTO: 3.81 10E6/UL (ref 3.8–5.2)
RBC URINE: >182 /HPF
SP GR UR STRIP: 1.02 (ref 1–1.03)
SQUAMOUS EPITHELIAL: 1 /HPF
UROBILINOGEN UR STRIP-MCNC: NORMAL MG/DL
WBC # BLD AUTO: 7.5 10E3/UL (ref 4–11)
WBC URINE: 125 /HPF

## 2022-06-02 PROCEDURE — 87086 URINE CULTURE/COLONY COUNT: CPT | Performed by: EMERGENCY MEDICINE

## 2022-06-02 PROCEDURE — 85025 COMPLETE CBC W/AUTO DIFF WBC: CPT | Performed by: ADVANCED PRACTICE MIDWIFE

## 2022-06-02 PROCEDURE — 81001 URINALYSIS AUTO W/SCOPE: CPT | Performed by: EMERGENCY MEDICINE

## 2022-06-02 PROCEDURE — 36415 COLL VENOUS BLD VENIPUNCTURE: CPT | Performed by: ADVANCED PRACTICE MIDWIFE

## 2022-06-02 PROCEDURE — 258N000003 HC RX IP 258 OP 636: Performed by: ADVANCED PRACTICE MIDWIFE

## 2022-06-02 PROCEDURE — 258N000003 HC RX IP 258 OP 636: Performed by: OBSTETRICS & GYNECOLOGY

## 2022-06-02 PROCEDURE — 250N000011 HC RX IP 250 OP 636: Performed by: ADVANCED PRACTICE MIDWIFE

## 2022-06-02 PROCEDURE — 250N000011 HC RX IP 250 OP 636: Performed by: OBSTETRICS & GYNECOLOGY

## 2022-06-02 PROCEDURE — 250N000013 HC RX MED GY IP 250 OP 250 PS 637

## 2022-06-02 PROCEDURE — 99221 1ST HOSP IP/OBS SF/LOW 40: CPT | Performed by: ADVANCED PRACTICE MIDWIFE

## 2022-06-02 RX ORDER — HEPARIN SODIUM,PORCINE 10 UNIT/ML
5 VIAL (ML) INTRAVENOUS
Status: CANCELLED | OUTPATIENT
Start: 2022-06-16

## 2022-06-02 RX ORDER — HEPARIN SODIUM (PORCINE) LOCK FLUSH IV SOLN 100 UNIT/ML 100 UNIT/ML
5 SOLUTION INTRAVENOUS
Status: CANCELLED | OUTPATIENT
Start: 2022-06-16

## 2022-06-02 RX ORDER — NALOXONE HYDROCHLORIDE 0.4 MG/ML
0.2 INJECTION, SOLUTION INTRAMUSCULAR; INTRAVENOUS; SUBCUTANEOUS
Status: CANCELLED | OUTPATIENT
Start: 2022-06-16

## 2022-06-02 RX ORDER — NALOXONE HYDROCHLORIDE 0.4 MG/ML
0.2 INJECTION, SOLUTION INTRAMUSCULAR; INTRAVENOUS; SUBCUTANEOUS
Status: DISCONTINUED | OUTPATIENT
Start: 2022-06-02 | End: 2022-06-02 | Stop reason: HOSPADM

## 2022-06-02 RX ORDER — ACETAMINOPHEN 325 MG/1
650 TABLET ORAL EVERY 4 HOURS PRN
Qty: 60 TABLET | Refills: 0 | Status: SHIPPED | OUTPATIENT
Start: 2022-06-02 | End: 2023-01-02

## 2022-06-02 RX ORDER — OXYCODONE HYDROCHLORIDE 5 MG/1
5 TABLET ORAL EVERY 6 HOURS PRN
Status: DISCONTINUED | OUTPATIENT
Start: 2022-06-02 | End: 2022-06-02 | Stop reason: HOSPADM

## 2022-06-02 RX ORDER — IBUPROFEN 600 MG/1
600 TABLET, FILM COATED ORAL EVERY 6 HOURS PRN
Status: DISCONTINUED | OUTPATIENT
Start: 2022-06-02 | End: 2022-06-02 | Stop reason: HOSPADM

## 2022-06-02 RX ORDER — METHYLPREDNISOLONE SODIUM SUCCINATE 125 MG/2ML
125 INJECTION, POWDER, LYOPHILIZED, FOR SOLUTION INTRAMUSCULAR; INTRAVENOUS
Status: DISCONTINUED | OUTPATIENT
Start: 2022-06-02 | End: 2022-06-02 | Stop reason: HOSPADM

## 2022-06-02 RX ORDER — NALOXONE HYDROCHLORIDE 0.4 MG/ML
0.4 INJECTION, SOLUTION INTRAMUSCULAR; INTRAVENOUS; SUBCUTANEOUS
Status: DISCONTINUED | OUTPATIENT
Start: 2022-06-02 | End: 2022-06-02 | Stop reason: HOSPADM

## 2022-06-02 RX ORDER — IBUPROFEN 400 MG/1
400 TABLET, FILM COATED ORAL EVERY 6 HOURS PRN
Qty: 60 TABLET | Refills: 1 | Status: SHIPPED | OUTPATIENT
Start: 2022-06-02 | End: 2022-12-20

## 2022-06-02 RX ORDER — DIPHENHYDRAMINE HYDROCHLORIDE 50 MG/ML
50 INJECTION INTRAMUSCULAR; INTRAVENOUS
Status: DISCONTINUED | OUTPATIENT
Start: 2022-06-02 | End: 2022-06-02 | Stop reason: HOSPADM

## 2022-06-02 RX ORDER — IBUPROFEN 200 MG
200 TABLET ORAL EVERY 6 HOURS PRN
Status: DISCONTINUED | OUTPATIENT
Start: 2022-06-02 | End: 2022-06-02

## 2022-06-02 RX ORDER — MEPERIDINE HYDROCHLORIDE 25 MG/ML
25 INJECTION INTRAMUSCULAR; INTRAVENOUS; SUBCUTANEOUS EVERY 30 MIN PRN
Status: CANCELLED | OUTPATIENT
Start: 2022-06-16

## 2022-06-02 RX ORDER — EPINEPHRINE 1 MG/ML
0.3 INJECTION, SOLUTION, CONCENTRATE INTRAVENOUS EVERY 5 MIN PRN
Status: CANCELLED | OUTPATIENT
Start: 2022-06-16

## 2022-06-02 RX ORDER — CLINDAMYCIN PHOSPHATE 900 MG/50ML
900 INJECTION, SOLUTION INTRAVENOUS EVERY 8 HOURS
Status: DISCONTINUED | OUTPATIENT
Start: 2022-06-02 | End: 2022-06-02 | Stop reason: HOSPADM

## 2022-06-02 RX ORDER — ALBUTEROL SULFATE 90 UG/1
1-2 AEROSOL, METERED RESPIRATORY (INHALATION)
Status: CANCELLED
Start: 2022-06-16

## 2022-06-02 RX ORDER — METHYLPREDNISOLONE SODIUM SUCCINATE 125 MG/2ML
125 INJECTION, POWDER, LYOPHILIZED, FOR SOLUTION INTRAMUSCULAR; INTRAVENOUS
Status: CANCELLED
Start: 2022-06-16

## 2022-06-02 RX ORDER — DIPHENHYDRAMINE HYDROCHLORIDE 50 MG/ML
50 INJECTION INTRAMUSCULAR; INTRAVENOUS
Status: CANCELLED
Start: 2022-06-16

## 2022-06-02 RX ORDER — ALBUTEROL SULFATE 0.83 MG/ML
2.5 SOLUTION RESPIRATORY (INHALATION)
Status: CANCELLED | OUTPATIENT
Start: 2022-06-16

## 2022-06-02 RX ADMIN — IRON SUCROSE 300 MG: 20 INJECTION, SOLUTION INTRAVENOUS at 11:27

## 2022-06-02 RX ADMIN — CLINDAMYCIN PHOSPHATE 900 MG: 900 INJECTION, SOLUTION INTRAVENOUS at 10:35

## 2022-06-02 RX ADMIN — CLINDAMYCIN PHOSPHATE 900 MG: 900 INJECTION, SOLUTION INTRAVENOUS at 01:12

## 2022-06-02 RX ADMIN — IBUPROFEN 600 MG: 600 TABLET, FILM COATED ORAL at 11:48

## 2022-06-02 RX ADMIN — GENTAMICIN SULFATE 380 MG: 40 INJECTION, SOLUTION INTRAMUSCULAR; INTRAVENOUS at 01:54

## 2022-06-02 ASSESSMENT — ACTIVITIES OF DAILY LIVING (ADL)
DOING_ERRANDS_INDEPENDENTLY_DIFFICULTY: NO
ADLS_ACUITY_SCORE: 35
ADLS_ACUITY_SCORE: 35
FALL_HISTORY_WITHIN_LAST_SIX_MONTHS: NO
ADLS_ACUITY_SCORE: 35
WEAR_GLASSES_OR_BLIND: NO
CONCENTRATING,_REMEMBERING_OR_MAKING_DECISIONS_DIFFICULTY: NO
DRESSING/BATHING_DIFFICULTY: NO
TOILETING_ISSUES: NO
ADLS_ACUITY_SCORE: 35
CHANGE_IN_FUNCTIONAL_STATUS_SINCE_ONSET_OF_CURRENT_ILLNESS/INJURY: NO
ADLS_ACUITY_SCORE: 35
DIFFICULTY_EATING/SWALLOWING: NO
WALKING_OR_CLIMBING_STAIRS_DIFFICULTY: NO

## 2022-06-02 NOTE — DISCHARGE SUMMARY
[facility]    Discharge Summary  Obstetrics    Date of Admission:  2022  Date of Discharge:  2022  Discharging Provider: LAXMI GREEN CNM  Date of Service (when I saw the patient): 22    Discharge Diagnoses   Postpartum abdominal and pelvic pain    History of Present Illness   Karen Loya is a 27 year old female who presented with postpartum abdominal pelvic pain.  with 2nd degree laceration on 22. No birth complications.       Hospital Course     Presented to ED for pain. ED evaluation included pelvic US, UA/UC, and CBC. Labs and US showing no large concern for endometritis and retained products of conception. Given abdominal tenderness patient admitted under MD service for antibiotics for possible endometritis. On discharge, her pain was well controlled. Vaginal bleeding is similar to peak menstrual flow.  Voiding without difficulty.  Ambulating well and tolerating a normal diet.  No fevers.  Breastfeeding is going well.  Patient is to be discharged to home later today per Dr. Alves's recommendation. Patient received IV antibiotics and IV venofer during this stay.   Post-partum hemoglobin:   Hemoglobin   Date Value Ref Range Status   2022 7.7 (L) 11.7 - 15.7 g/dL Final   2021 9.1 (L) 11.7 - 15.7 g/dL Final       LAXMI GREEN CNM    Physical Exam  Constitutional:       Appearance: Normal appearance.   Genitourinary:      Vulva normal.      Vaginal exam comments: Lochia appropriate. Perineum well-approximated. .     Pulmonary:      Effort: Pulmonary effort is normal.   Abdominal:      General: There is no distension.      Palpations: Abdomen is soft.      Tenderness: There is abdominal tenderness. There is no guarding.      Comments: Tenderness with fundal check. Fundus deep, involuting as expected.   Musculoskeletal:      Cervical back: Normal range of motion.   Neurological:      General: No focal deficit present.      Mental Status: She is  alert and oriented to person, place, and time.   Skin:     General: Skin is warm and dry.         Discharge Disposition   Discharged to home   Condition at discharge: Stable  S: Patient reports feeling well and pain is tolerable after starting the IV antibiotics. Patient reports passing several blood clots size of quarter last night and feels better now.   A: (O90.89,  R52) Postpartum pain  (primary encounter diagnosis)  Comment: patient feeling much better  Plan: benzocaine (AMERICAINE) 20 % external aerosol        Patient agrees to do IV venofer inpatient today and in outpatient in 2 weeks    Plan: ibuprofen (ADVIL/MOTRIN) 400 MG tablet,         acetaminophen (TYLENOL) 325 MG tablet            P: Follow Dr. Alves's recommended plan,  IV venofer in two weeks, take tylenol 650 po  Every 4 hrs alternating iburofen 400 mg po every 4 hrs.Continue with prenatal vitamins. Patient agrees with the plan   Follow up within 1 week in clinic     Primary Care Physician   Physician No Ref-Primary    Consultations This Hospital Stay   OB GYN IP CONSULT    Discharge Orders   No discharge procedures on file.  Discharge Medications   Current Discharge Medication List      START taking these medications    Details   benzocaine (AMERICAINE) 20 % external aerosol Spray on perineum  Qty: 57 g, Refills: 1    Associated Diagnoses: Postpartum pain         CONTINUE these medications which have CHANGED    Details   acetaminophen (TYLENOL) 325 MG tablet Take 2 tablets (650 mg) by mouth every 4 hours as needed for mild pain or fever  Qty: 60 tablet, Refills: 0    Associated Diagnoses:  (normal spontaneous vaginal delivery)      ibuprofen (ADVIL/MOTRIN) 400 MG tablet Take 1 tablet (400 mg) by mouth every 6 hours as needed for other (cramping)  Qty: 60 tablet, Refills: 1    Associated Diagnoses:  (normal spontaneous vaginal delivery)         CONTINUE these medications which have NOT CHANGED    Details   cyanocobalamin (VITAMIN B-12)  1000 MCG tablet Take 1 tablet (1,000 mcg) by mouth daily  Qty: 90 tablet, Refills: 3    Associated Diagnoses: Anemia affecting pregnancy in second trimester      docusate sodium (COLACE) 100 MG capsule Take 1 capsule (100 mg) by mouth daily  Qty: 90 capsule, Refills: 1    Associated Diagnoses:  (normal spontaneous vaginal delivery)      ferrous sulfate (FEROSUL) 325 (65 Fe) MG tablet Take 1 tablet (325 mg) by mouth every other day  Qty: 30 tablet, Refills: 4    Associated Diagnoses: Anemia affecting pregnancy in second trimester      Prenatal Vit-Fe Fumarate-FA (PRENATAL MULTIVITAMIN W/IRON) 27-0.8 MG tablet Take 1 tablet by mouth daily  Qty: 90 tablet, Refills: 3    Associated Diagnoses: Encounter for supervision of other normal pregnancy in second trimester      vitamin C (ASCORBIC ACID) 250 MG tablet Take 1 tablet (250 mg) by mouth daily  Qty: 90 tablet, Refills: 3    Associated Diagnoses: Anemia affecting pregnancy in second trimester      VITAMIN D3 25 MCG (1000 UT) tablet Take 1 tablet by mouth daily           Allergies   No Known Allergies

## 2022-06-02 NOTE — PROVIDER NOTIFICATION
06/02/22 0145   Provider Notification   Provider Name/Title HALINA Tafoya   Method of Notification Phone   Request Evaluate-Remote   Notification Reason Lab Results     Called to clarify UA order.  CNM would still like UA to be collected.  Will update patient and collect when able.

## 2022-06-02 NOTE — ED NOTES
Pt refusing covid swab.  Pt wanting to swab her nose herself and only just inside nose.  Multiple attempts to explain reason for swab to pt.  Pt grabbing this writer's arms and attempting to grab swab.  Aborted collection of covid swab.  MD made aware.  Spoke with pt's admitting nurse, Noemy, who will speak with midwife regarding situation.

## 2022-06-02 NOTE — ED PROVIDER NOTES
History     Chief Complaint:  Abdominal Pain       HPI   HPI through the professional Serbian  over the phone.  Karen Loya is a 27 year old female who is 4 days postpartum from a vaginal delivery who presents with pelvic pain.  The patient delivered an 8 pound 2 ounce baby girl by vaginal delivery at 42 weeks gestation on 5/27/2022.  She had initially been induced with Pitocin on 5/21/2022 because she was postdates at 41 weeks 2 days however she was discharged on 5/22/2022 and returned on 5/27/2022 and delivered her baby by vaginal delivery which was reportedly a difficult vaginal delivery with shoulder dystocia per the midwife Lottie Eisenberg who I spoke with this evening.  The patient states that she has been having pelvic pain since a vaginal delivery that has been worsening and feels like cramping labor pains.  The pains have been becoming more severe.  When the pain is more severe she becomes nauseated with dry heaves but no actual vomiting.  She has not taken any pain medication at home.  She has a small amount of vaginal bleeding but denies any purulent vaginal discharge.  She denies any fevers or chills, back pain, vomiting, diarrhea, constipation.  She has been having regular bowel movements.  She is breast-feeding.  She is able to walk normally.  No numbness tingling or weakness of her arms or legs.  No blood in her urine, dysuria, urgency or frequency of urination.    ROS:  Review of Systems   Constitutional: Negative for chills and fever.   Respiratory: Negative for cough.    Gastrointestinal: Positive for abdominal pain and nausea. Negative for anal bleeding, blood in stool, constipation, diarrhea and vomiting.   Genitourinary: Positive for pelvic pain. Negative for dysuria and frequency.   All other systems reviewed and are negative.    Allergies:  No Known Allergies     Medications:    acetaminophen (TYLENOL) 325 MG tablet  cyanocobalamin (VITAMIN B-12) 1000 MCG  tablet  docusate sodium (COLACE) 100 MG capsule  ferrous sulfate (FEROSUL) 325 (65 Fe) MG tablet  ibuprofen (ADVIL/MOTRIN) 800 MG tablet  Prenatal Vit-Fe Fumarate-FA (PRENATAL MULTIVITAMIN W/IRON) 27-0.8 MG tablet  vitamin C (ASCORBIC ACID) 250 MG tablet  VITAMIN D3 25 MCG (1000 UT) tablet        Past Medical History:    Past Medical History:   Diagnosis Date     Nausea/vomiting in pregnancy 9/25/2020     No pertinent past medical history 09/16/2020     Post term pregnancy, antepartum 4/13/2021     Post-dates pregnancy 4/14/2021     Supervision of high risk pregnancy in third trimester 3/17/2021       Past Surgical History:    Past Surgical History:   Procedure Laterality Date     NO HISTORY OF SURGERY  09/16/2020        Family History:    family history is not on file.    Social History:   reports that she has never smoked. She has never used smokeless tobacco. She reports that she does not drink alcohol and does not use drugs.  PCP: No Ref-Primary, Physician     Physical Exam     Patient Vitals for the past 24 hrs:   BP Temp Temp src Pulse Resp SpO2   06/01/22 1533 131/71 97.2  F (36.2  C) Temporal 93 24 100 %        Physical Exam  Nursing note and vitals reviewed.  Constitutional:  Appears well-developed and well-nourished.   HENT:   Head:    Atraumatic.   Mouth/Throat:   Oropharynx is clear and moist. No oropharyngeal exudate.   Eyes:    Pupils are equal, round, and reactive to light.   Neck:    Normal range of motion. Neck supple.      No tracheal deviation present. No thyromegaly present.   Cardiovascular:  Normal rate, regular rhythm, no murmur   Pulmonary/Chest: Breath sounds are clear and equal without wheezes or crackles.  Abdominal:   Soft. Bowel sounds are normal. Exhibits no distension and      no mass. There is tenderness midline over the suprapubic region with guarding but no rebound.  No palpable mass.  Pelvic exam: External genitalia normal.  There is a small amount of dark red/brown bloody vaginal  discharge which she does not have an odor.  The cervix appears normal postpartum.  She has moderate cervical and uterine tenderness.  Her vaginal wound appears well-healing.  Musculoskeletal:  Exhibits no edema.   Lymphadenopathy:  No cervical adenopathy.   Neurological:   Alert and oriented to person, place, and time.   Skin:    Skin is warm and dry. No rash noted. No pallor.     Emergency Department Course     Imaging:  US Pelvis Complete without Transvaginal   Final Result   IMPRESSION:   1.  No evidence of retained products of conception or endometritis   2.  Normal right ovary with no evidence of torsion   3.  Left ovary not visible                  Report per radiology    Laboratory:  Labs Ordered and Resulted from Time of ED Arrival to Time of ED Departure   BASIC METABOLIC PANEL - Abnormal       Result Value    Sodium 139      Potassium 3.5      Chloride 107      Carbon Dioxide (CO2) 27      Anion Gap 5      Urea Nitrogen 9      Creatinine 0.57      Calcium 8.6      Glucose 100 (*)     GFR Estimate >90     CBC WITH PLATELETS AND DIFFERENTIAL - Abnormal    WBC Count 7.8      RBC Count 3.85      Hemoglobin 7.7 (*)     Hematocrit 26.9 (*)     MCV 70 (*)     MCH 20.0 (*)     MCHC 28.6 (*)     RDW 21.0 (*)     Platelet Count 340      % Neutrophils 61      % Lymphocytes 24      % Monocytes 10      % Eosinophils 4      % Basophils 0      % Immature Granulocytes 1      NRBCs per 100 WBC 0      Absolute Neutrophils 4.9      Absolute Lymphocytes 1.8      Absolute Monocytes 0.7      Absolute Eosinophils 0.3      Absolute Basophils 0.0      Absolute Immature Granulocytes 0.0      Absolute NRBCs 0.0     ROUTINE UA WITH MICROSCOPIC REFLEX TO CULTURE   COVID-19 VIRUS (CORONAVIRUS) BY PCR        Procedures       Emergency Department Course:       Reviewed:  I reviewed nursing notes, vitals and past medical history    Assessments:   I obtained history and examined the patient as noted above.    I rechecked the patient and  explained findings and plan through the [facility] interpeter.      Consults:   Lottie Eisenberg, OB nurse midwife  Dr. Jenny Salguero, OB/GYN physician.    Interventions:  Medications   HYDROmorphone (PF) (DILAUDID) injection 0.5 mg (has no administration in time range)   HYDROmorphone (PF) (DILAUDID) injection 0.5 mg (0.5 mg Intravenous Given 6/1/22 2004)        Disposition:  The patient was admitted to the hospital under the care of Dr. Jenny Salguero, OB/GYN.     Impression & Plan      Medical Decision Making:  This patient has worsening postpartum pelvic pain and I discussed with the patient, the OB/GYN physician and the nurse midwife that I am concerned about possible endometritis as a cause of her pain especially considering that she had a difficult delivery of a large baby, she was postdates at 42 weeks gestation when she delivered, and there was a prolonged time between the initial induction of delivery and the delivery itself.  She has a significant pelvic pain which she describes as labor pain and pelvic exam findings concerning for endometritis, although does not have any fever and she has a normal white blood cell count.  After I discussed the case with the OB/GYN physician Dr. Salguero, the patient was admitted to postpartum hospital for for further evaluation and treatment.  Dr. Salguero did not recommend any empiric antibiotics at this time pending further evaluation and examination by an OB/GYN physician.  The patient was given IV Dilaudid for pain control here with improvement of her pain.  She was not appearing septic or toxic at this time.    Diagnosis:    ICD-10-CM    1. Postpartum pain  O90.89     R52         Discharge Medications:  New Prescriptions    No medications on file        6/1/2022   Pinky Daily MD [place], Cheri Lee, MD  06/01/22 9539

## 2022-06-02 NOTE — UTILIZATION REVIEW
"  Admission Status; Secondary Review Determination         Under the authority of the Utilization Management Committee, the utilization review process indicated a secondary review on the above patient.  The review outcome is based on review of the medical records, discussions with staff, and applying clinical experience noted on the date of the review.          (x) Observation Status Appropriate - This patient does not meet hospital inpatient criteria and is placed in observation status. If this patient's primary payer is Medicare and was admitted as an inpatient, Condition Code 44 should be used and patient status changed to \"observation\".     RATIONALE FOR DETERMINATION   27 year old  six days s/p vaginal delivery on 22,  Karen Loya is admitted [facility] the [facility] on 2022 at 12:35 AM   Endometritis- suspected VSS-afebrile, no other systemic inflammatory response normal white count.  Abdominal pelvic pain in a pt who is PPD #6 without risk factors for chorio/endometritis.     The severity of illness, intensity of service provided, expected LOS and risk for adverse outcome make the care appropriate for further observation; however, doesn't meet criteria for hospital inpatient admission. Dr Alves notified of this determination.    This document was produced using voice recognition software.      The information on this document is developed by the utilization review team in order for the business office to ensure compliance.  This only denotes the appropriateness of proper admission status and does not reflect the quality of care rendered.         The definitions of Inpatient Status and Observation Status used in making the determination above are those provided in the CMS Coverage Manual, Chapter 1 and Chapter 6, section 70.4.      Sincerely,     MEGAN HOLBROOK MD    System Medical Director  Utilization Management  [facility].      "

## 2022-06-02 NOTE — CONSULTS
Karen Loya is a 27 year old [place]ron[place]  female  s/p IOL at 42 weeks gestation of a 3680 gm female infant apgars 9,9 at 1&5 min without complications or prolonged ROM or other risk factors for chorio on 22 who was admitted last pm for postpartum pain.  In reviewing the delivery summary there was NO shoulder dystocia noted or other risk factors for infection.  She had initially been induced with Pitocin on 2022 because she was postdates at 41 weeks 2 days however she was discharged on 2022 and returned on 2022 for a reattempt at IOL.  The pt stated that she hd been having pelvic since delivery but that it had been worsening hence her trip to the ER for eval.  She has not taken any analgesics.  She denies F/C, N/V or changes in bowel or bladder function. Excessive vaginal flow dysuria or related concerns. The pt is breast feeding.  Her prenatal course was remarkable for a hypochromic microcytic anemia.  Iron had been ordered but the pt did not take it.    Past Medical History:   Diagnosis Date     Nausea/vomiting in pregnancy 2020     No pertinent past medical history 2020     Post term pregnancy, antepartum 2021     Post-dates pregnancy 2021     Supervision of high risk pregnancy in third trimester 3/17/2021     Clinic/Hospital: [facility] Partner Name: [facility] Ultrasound predicts sex: boy Childrens names/ages: 20 month old boy Previous labor experiences: IOL for preeclampsia,  of 7lb8oz boy  Waterbirth (interest, declined, ineligible): n/a Level of education/occupation:  Pertinent History: Obesity, belkis. Macrosomia. Previous infant was suspected to be LGA and was AGA  PP contraception: Undecided  Hx PPD/Dep     Current Facility-Administered Medications   Medication     benzocaine (AMERICAINE) 20 % topical spray     clindamycin (CLEOCIN) infusion 900 mg     gentamicin (GARAMYCIN) 380 mg in sodium chloride 0.9 % 50 mL intermittent infusion     ibuprofen (ADVIL/MOTRIN) tablet  200 mg     naloxone (NARCAN) injection 0.2 mg    Or     naloxone (NARCAN) injection 0.4 mg    Or     naloxone (NARCAN) injection 0.2 mg    Or     naloxone (NARCAN) injection 0.4 mg     No Tdap Needed - Assessment: Patient does not need Tdap vaccine     oxyCODONE (ROXICODONE) tablet 5 mg     Past Surgical History:   Procedure Laterality Date     NO HISTORY OF SURGERY  09/16/2020     Social History     Socioeconomic History     Marital status:      Spouse name: [facility]     Number of children: 2   Tobacco Use     Smoking status: Never Smoker     Smokeless tobacco: Never Used   Vaping Use     Vaping Use: Never used   Substance and Sexual Activity     Alcohol use: Never     Drug use: Never     Sexual activity: Yes     Partners: Male     Comment: Pregnant     No family history on file.  /59 (BP Location: Left arm, Patient Position: Semi-Parker's, Cuff Size: Adult Regular)   Pulse 93   Temp 97  F (36.1  C) (Oral)   Resp 18   LMP 08/19/2021   SpO2 100%   Breastfeeding Yes   Constitutional: healthy, alert and mild distress  Head: Normocephalic. No masses, lesions, tenderness or abnormalities  Neck: Neck supple. No adenopathy. Thyroid symmetric, normal size,, Carotids without bruits.  Cardiovascular: negative, PMI normal. No lifts, heaves, or thrills. RRR. No murmurs, clicks gallops or rub  Respiratory: negative, Percussion normal. Good diaphragmatic excursion. Lungs clear  Gastrointestinal: Abdomen soft, mildly-tender in the lower abdominal pelvic region without rebound or guarding. BS normal. Small umbilical hernia noted without sx of strangulation  No masses, organomegaly  Musculoskeletalextremities normal- no gross deformities noted, gait normal and normal muscle tone  Integument: no suspicious lesions or rashes  Neurologic: Gait normal. Reflexes normal and symmetric. Sensation grossly WNL.  Psychiatric: mentation appears normal and affect normal/bright  Hematologic/Lymphatic/Immunologic: Normal cervical  lymph nodes      Labs      Component      Latest Ref Rng & Units 6/1/2022 6/2/2022   WBC      4.0 - 11.0 10e3/uL 7.8 7.5   RBC Count      3.80 - 5.20 10e6/uL 3.85 3.81   Hemoglobin      11.7 - 15.7 g/dL 7.7 (L) 7.7 (L)   Hematocrit      35.0 - 47.0 % 26.9 (L) 27.0 (L)   MCV      78 - 100 fL 70 (L) 71 (L)   MCH      26.5 - 33.0 pg 20.0 (L) 20.2 (L)   MCHC      31.5 - 36.5 g/dL 28.6 (L) 28.5 (L)   RDW      10.0 - 15.0 % 21.0 (H) 21.1 (H)   Platelet Count      150 - 450 10e3/uL 340 335   % Neutrophils      % 61 56   % Lymphocytes      % 24 28   % Monocytes      % 10 11   % Eosinophils      % 4 5   % Basophils      % 0 0   % Immature Granulocytes      % 1 0   NRBCs per 100 WBC      <1 /100 0 0   Absolute Neutrophils      1.6 - 8.3 10e3/uL 4.9 4.2   Absolute Lymphocytes      0.8 - 5.3 10e3/uL 1.8 2.1   Absolute Monocytes      0.0 - 1.3 10e3/uL 0.7 0.8   Absolute Eosinophils      0.0 - 0.7 10e3/uL 0.3 0.4   Absolute Basophils      0.0 - 0.2 10e3/uL 0.0 0.0   Absolute Immature Granulocytes      <=0.4 10e3/uL 0.0 0.0   Absolute NRBCs      10e3/uL 0.0 0.0   Color Urine      Colorless, Straw, Light Yellow, Yellow  Yellow   Appearance Urine      Clear  Slightly Cloudy (A)   Glucose Urine      Negative mg/dL  Negative   Bilirubin Urine      Negative  Negative   Ketones Urine      Negative mg/dL  Negative   Specific Gravity Urine      1.003 - 1.035  1.020   Blood Urine      Negative  Large (A)   pH Urine      5.0 - 7.0  6.5   Protein Albumin Urine      Negative mg/dL  30 (A)   Urobilinogen mg/dL      Normal, 2.0 mg/dL  Normal   Nitrite Urine      Negative  Negative   Leukocyte Esterase Urine      Negative  Large (A)   Bacteria Urine      None Seen /HPF  Few (A)   Mucus Urine      None Seen /LPF  Present (A)   RBC Urine      <=2 /HPF  >182 (H)       A:  Abdominal pelvic pain in a pt who is PPD #6 without risk factors for chorio/endometritis.    hypochromic microcytic anemia  Most probably Fe deficiency who was admitted and  started on IV Gent and Clinda.  Normal WBC  No excess lochia or signs of retained POC's  Small umbilical hernia    P:  I rec IV venofer 300 mg IV now and consider repeat in 2 weeks.  I feel pt can safely be discharge later today after the IV Fe with office f/u early next week.  rec pt take her temp BID and call if > 100.4, rec tylenol 650 mg po q 4 hrs alternating with Ibuprofen 400mg po q 4 hrs.  I feel the pt would benefit from prenatal MVI's 1 p[o daily.  I would not discharge on oral abx at this time.  Will await UC results and Rx as appropriate.  UA has vag contaminant and would not Rx based solely on that result    Thank you for allowing us to partake in your pt's care.  Please let me know if there are any questions     50 min spent in pt interview exam chart review plan formulation and documentation

## 2022-06-02 NOTE — PLAN OF CARE
Patient vital signs stable and meeting expected outcomes.  IV antibiotics continue; saline locked in between.  Pain controlled well per patient.  Patient declined pain medications after transferring up from the ED.  Knows to call if she would like something for pain.  Up independently in room.  Voiding adequately.  Awaiting UA results.  Labs to be drawn in AM.   present and supportive at bedside with .  Will continue to monitor.

## 2022-06-02 NOTE — PROVIDER NOTIFICATION
06/02/22 0000   Provider Notification   Provider Name/Title HALINA Ford   Method of Notification Phone   Request Evaluate-Remote   Notification Reason Status Update     Paged at 0014 for orders as patient arrived to unit.  Awaiting call back.    0027: HALINA to come up and see patient.  Will put orders in when on unit.  Will continue to monitor.

## 2022-06-02 NOTE — DISCHARGE INSTRUCTIONS
Endometriosis    Endometriosis is a condition that occurs when the tissue that lines the uterus starts to grow where it should not. The tissue may grow on the outside surface of the uterus, the ovaries, or fallopian tubes. It may also grow on the bowel, rectum, bladder, or other areas. The tissue responds to the same hormones that control your menstrual cycle. So it may swell and bleed just like the lining of the uterus, which is shed every month during your period. The swelling and blood irritate nearby tissues. This causes pelvic or lower belly (abdominal) pain and cramping. The pain is often worse around the time of your period. Pain during sex is also common. Some women have pain during bowel movements or urination. Over time, scar tissue may form. This scar tissue can bind organs together. It can also cause problems getting pregnant (infertility).   The exact cause of endometriosis is unknown. The best way to confirm a diagnosis is with a procedure called laparoscopy. This allows the provider to look inside the abdomen with a small tube and light.   Treatment depends on your symptoms. If pain is mild, you may not need treatment. If pain is more severe, medicines may be advised. Surgery may also be an option. Your provider can tell you more as needed.   Home care  Medicines  To help manage endometriosis, any of the following medicines may be used:  Pain relievers, such as non-steroidal anti-inflammatory drugs (NSAIDS)  Hormonal medicines  Birth control pills  Progestin-only pills  Gonadotropin-releasing hormone (GnRH) agonists  Danazol  Aromatase inhibitors  If you re prescribed medicines, take them as directed. Also report any side effects you have to your provider.   General care  Rest as needed when you have symptoms.  To help ease pain or cramping, apply a heating pad to where the pain is. You may also use a hot water bottle.    Follow-up care  Follow up with your healthcare provider, or as directed.  When  to seek medical advice  Call your healthcare provider right away if any of these occur:  Fever of 100.4 F (38 C) or higher, or as directed by your provider  Abdominal or pelvic pain or cramping gets worse, or doesn t get better with medicines  Pain during sex, urination, or bowel movements continues  Heavy bleeding (soaking 1 pad or tampon every hour for 3 hours)  Missed periods (if they are usually regular)  Sudden, severe pain in the abdomen  Abdominal swelling with nausea or vomiting  Blood in the urine or problems urinating  Blood in the stool  Dizziness, weakness, fainting  Infertility  Note: If you have been unable to get pregnant after trying for 1 year, see your provider. They can talk with you about infertility testing.   [employer] last reviewed this educational content on 7/1/2020 2000-2021 [employer]. All rights reserved. This information is not intended as a substitute for professional medical care. Always follow your healthcare professional's instructions.

## 2022-06-02 NOTE — H&P
CNM Post Partum Admission History & Physical    Karen Loya is a 27 year old  six days s/p vaginal delivery on 22,  Karen Loya is admitted to the Birth[facility] at [facility] on 2022 at 12:35 AM       History of present inllness/Chief Complaint:  Karen present to the ED on 22 with pelvic pain. She reports pelvic pain since the vaginal birth that feels like labor cramps. She was evaluated in the ED by MD. This writer spoke with MD about pt. For clarification pt did not have shoulder or difficult delivery per delivery note.    Karen states the pain does fluctuate. It comes and goes, like cramps do. Sometimes the pain is in the lower abdomen only and other times it is lower along the upper inner tights and at times radiates around the abdomen but not to the back. The pain does not pass the umbilicus. Is not helped by standing and worsens with walking.  She does not have heavy bleeding, unusual vaginal odor, denies pain with urination and constipation. The pain medication given downstairs helps a lot.       Patient Active Problem List   Diagnosis     History of pre-eclampsia in prior pregnancy, currently pregnant     Supervision of high risk pregnancy in third trimester     Obesity affecting pregnancy in third trimester     History of third degree perineal laceration     Vitamin D deficiency     Anemia of mother in pregnancy, antepartum, third trimester     Poor fetal growth affecting management of mother in third trimester, single or unspecified fetus     Post-dates pregnancy     Postpartum pain       HISTORY  No Known Allergies  Past Medical History:   Diagnosis Date     Nausea/vomiting in pregnancy 2020     No pertinent past medical history 2020     Post term pregnancy, antepartum 2021     Post-dates pregnancy 2021     Supervision of high risk pregnancy in third trimester 3/17/2021     Clinic/Hospital: [facility] Partner Name: [facility] Ultrasound predicts sex: boy Childrens  names/ages: 20 month old boy Previous labor experiences: IOL for preeclampsia,  of 7lb8oz boy  Waterbirth (interest, declined, ineligible): n/a Level of education/occupation:  Pertinent History: Obesity, belkis. Macrosomia. Previous infant was suspected to be LGA and was AGA  PP contraception: Undecided  Hx PPD/Dep     Past Surgical History:   Procedure Laterality Date     NO HISTORY OF SURGERY  2020     No family history on file.  Social History     Tobacco Use     Smoking status: Never Smoker     Smokeless tobacco: Never Used   Substance Use Topics     Alcohol use: Never       LABS:  Lab Results   Component Value Date    ABO O 2021    ABO O 2021    RH Pos 2021    RH Pos 2021    AS Negative 2022    HGB 7.7 (L) 2022    HEPBANG Nonreactive 2021    CHPCRT Negative 10/21/2020    GCPCRT Negative 10/21/2020       GBS Status:   Lab Results   Component Value Date    GBS Positive (A) 2021     ROS   Pt is alert and oriented  Pt denies  fever and/or malaise.    Pt denies significant respiratory, cardiovacular, GI, or muscular/skeletal complaints.    Neuro: Denies HA and visual changes  Muscoloskeletal: lower pelvic pain     PHYSICAL EXAM:  /71   Pulse 93   Temp 97.2  F (36.2  C) (Temporal)   Resp 24   LMP 2021   SpO2 100%   Breastfeeding Yes   General appearance:  healthy, alert, active and no distress   Heart: RRR  Lungs: CTA bilaterally, normal respiratory effort  Abdomen: tender along the lower abdomen midline with palpation. Fundus not tender.   :  FGM external genitalia. No edema noted. Laceration intact. Minimal lochia. No gushing.     ASSESSMENT:  27 year old  now six days PP  Pelvic pain   Endometritis- suspected   VSS-afebrile   Refusing covid test -was negative at time of delivery     PLAN:  Encourage covid testing   Labs and ultrasound reviewed.   Reviewed plan with Dr. Salguero. Start empirical treatment for suspected endometritis.    Formal OB consult in AM. Order placed.   Pain management as needed.   Lottie Eisenberg CNM

## 2022-06-02 NOTE — PROGRESS NOTES
AVS read to patient and all questions and concerns addressed. Patient given IV antibiotic and Iron this shift. Patient using ibuprofen for pain. Patient educated on when to return to clinic and medications. Patient discharged from department today.

## 2022-06-03 LAB — BACTERIA UR CULT: NO GROWTH

## 2022-06-13 PROBLEM — D50.9 ANEMIA, IRON DEFICIENCY: Status: ACTIVE | Noted: 2022-06-13

## 2022-08-03 ENCOUNTER — PRENATAL OFFICE VISIT (OUTPATIENT)
Dept: MIDWIFE SERVICES | Facility: CLINIC | Age: 27
End: 2022-08-03
Payer: COMMERCIAL

## 2022-08-03 VITALS — SYSTOLIC BLOOD PRESSURE: 102 MMHG | BODY MASS INDEX: 34.78 KG/M2 | WEIGHT: 209 LBS | DIASTOLIC BLOOD PRESSURE: 62 MMHG

## 2022-08-03 DIAGNOSIS — R10.2 PELVIC PAIN IN FEMALE: Primary | ICD-10-CM

## 2022-08-03 DIAGNOSIS — Z30.011 ENCOUNTER FOR BCP (BIRTH CONTROL PILLS) INITIAL PRESCRIPTION: ICD-10-CM

## 2022-08-03 PROCEDURE — 99207 PR POST PARTUM EXAM: CPT | Performed by: ADVANCED PRACTICE MIDWIFE

## 2022-08-03 RX ORDER — ACETAMINOPHEN AND CODEINE PHOSPHATE 120; 12 MG/5ML; MG/5ML
0.35 SOLUTION ORAL DAILY
Qty: 84 TABLET | Refills: 3 | Status: SHIPPED | OUTPATIENT
Start: 2022-08-03 | End: 2023-01-02

## 2022-08-03 ASSESSMENT — PATIENT HEALTH QUESTIONNAIRE - PHQ9: SUM OF ALL RESPONSES TO PHQ QUESTIONS 1-9: 2

## 2022-08-03 NOTE — PROGRESS NOTES
Midwife Postpartum 6 Week Visit    Karen Loya is a 27 year old here for a postpartum checkup.     Delivery date was 22. She had a  of a viable girl, weight 8 pounds 1.8 oz., with no complications      Since delivery, she has been breast feeding.  She has not had any signs of infection, her lochia stopped.  She has not had other complications.  She is still having pelvic pain and requests and ultrasoun.      She is voiding and having bowel movements without difficulty.       Contraception was discussed and patient desires mini pill / progesterone only pill.   She  has not had intercourse since delivery.   She complains of mild  perineal discomfort.     Mood is Stable  Patient screened for postpartum depression.   Depression Rating was:     ROS:  CONSTITUTIONAL: NEGATIVE for fever, chills, change in weight  INTEGUMENTARU/SKIN: NEGATIVE for worrisome rashes, moles or lesions  EYES: NEGATIVE for vision changes or irritation  ENT: NEGATIVE for ear, mouth and throat problems  RESP: NEGATIVE for significant cough or SOB  BREAST: NEGATIVE for masses, tenderness or discharge  CV: NEGATIVE for chest pain, palpitations or peripheral edema  GI: NEGATIVE for nausea, abdominal pain, heartburn, or change in bowel habits  : NEGATIVE for unusual urinary or vaginal symptoms. Continues to have pelvic pain since birth.    MUSCULOSKELETAL: NEGATIVE for significant arthralgias or myalgia  NEURO: NEGATIVE for weakness, dizziness or paresthesias  PSYCHIATRIC: NEGATIVE for changes in mood or affect      Current Outpatient Medications:      acetaminophen (TYLENOL) 325 MG tablet, Take 2 tablets (650 mg) by mouth every 4 hours as needed for mild pain or fever, Disp: 60 tablet, Rfl: 0     benzocaine (AMERICAINE) 20 % external aerosol, Spray on perineum, Disp: 57 g, Rfl: 1     cyanocobalamin (VITAMIN B-12) 1000 MCG tablet, Take 1 tablet (1,000 mcg) by mouth daily (Patient not taking: No sig reported), Disp: 90 tablet, Rfl: 3      docusate sodium (COLACE) 100 MG capsule, Take 1 capsule (100 mg) by mouth daily, Disp: 90 capsule, Rfl: 1     ferrous sulfate (FEROSUL) 325 (65 Fe) MG tablet, Take 1 tablet (325 mg) by mouth every other day (Patient not taking: No sig reported), Disp: 30 tablet, Rfl: 4     ibuprofen (ADVIL/MOTRIN) 400 MG tablet, Take 1 tablet (400 mg) by mouth every 6 hours as needed for other (cramping), Disp: 60 tablet, Rfl: 1     Prenatal Vit-Fe Fumarate-FA (PRENATAL MULTIVITAMIN W/IRON) 27-0.8 MG tablet, Take 1 tablet by mouth daily (Patient not taking: No sig reported), Disp: 90 tablet, Rfl: 3     vitamin C (ASCORBIC ACID) 250 MG tablet, Take 1 tablet (250 mg) by mouth daily (Patient not taking: No sig reported), Disp: 90 tablet, Rfl: 3     VITAMIN D3 25 MCG (1000 UT) tablet, Take 1 tablet by mouth daily (Patient not taking: No sig reported), Disp: , Rfl: .   OB History    Para Term  AB Living   3 3 3 0 0 3   SAB IAB Ectopic Multiple Live Births   0 0 0 0 3      # Outcome Date GA Lbr Robert/2nd Weight Sex Delivery Anes PTL Lv   3 Term 22 42w0d 05:00 / 00:13 3.68 kg (8 lb 1.8 oz) F Vag-Spont Local, IV N MARLA      Name: RGFEMALE-FRANKIE      Apgar1: 9  Apgar5: 9   2 Term 04/15/21 41w2d 05:17 / 02:04 3.82 kg (8 lb 6.8 oz) M Vag-Spont EPI N MARLA      Name: RGMALE-FRANKIE      Apgar1: 8  Apgar5: 9   1 Term 19 40w0d  3.175 kg (7 lb) M Vag-Spont   MARLA      Birth Comments: partial 3rd degree lac, IOL for GHTN     Last pap:  No results found for: PAP  Hgb in hospital was 7.7    EXAM:  /62 (BP Location: Right arm, Cuff Size: Adult Large)   Wt 94.8 kg (209 lb)   LMP 2021   Breastfeeding Yes   BMI 34.78 kg/m    BMI: There is no height or weight on file to calculate BMI.  Exam:  Constitutional: healthy, alert, mild distress, cooperative and over weight  Head: Normocephalic. No masses, lesions, tenderness or abnormalities  Neck: Neck supple. No adenopathy. Thyroid symmetric, normal size,, Carotids  without bruits.  ENT: ENT exam normal, no neck nodes or sinus tenderness  Cardiovascular: negative, PMI normal. No lifts, heaves, or thrills. RRR. No murmurs, clicks gallops or rub  Respiratory: negative, Percussion normal. Good diaphragmatic excursion. Lungs clear  Breasts: Exam deferred   Gastrointestinal: bloated   Musculoskeletal: extremities normal- no gross deformities noted, gait normal and normal muscle tone  Skin: no suspicious lesions or rashes  Neurologic: Gait normal. Reflexes normal and symmetric. Sensation grossly WNL.  Psychiatric: mentation appears normal and affect normal/bright    PELVIC EXAM:  Declines pelvic exam, says she currently has her period.  She would like to return for a  Pap.      ASSESSMENT:   Normal postpartum exam after .    ICD-10-CM    1. Pelvic pain in female  R10.2 CBC with platelets     Physical Therapy Referral     US Pelvic Complete with Transvaginal     Vitamin D Deficiency   2. Normal postpartum course  Z39.2 CBC with platelets     Physical Therapy Referral     US Pelvic Complete with Transvaginal     Vitamin D Deficiency   3. Encounter for BCP (birth control pills) initial prescription  Z30.011 norethindrone (MICRONOR) 0.35 MG tablet         PLAN:    Return as needed or at time of next expected pap, pelvic, or breast exam.  Teaching: birth control  Family Planning:mini pill / progesterone only pill  Encourage Kegels and abdominal exercise.  Continue a multivitamin/prenatal supplement, especially if breastfeeding.  Pap smear was not obtained today.  She would like to return for a pap.    Postpartum Hgb was done today.  She also requests Vit D.    Pelvic US ordered per her request.    She agrees to a P.T. referral for the pelvic pain also.

## 2022-08-03 NOTE — NURSING NOTE
"Chief Complaint   Patient presents with     Postpartum Care      22, readmit - for pelvic and abdominal pain, c/o constant pain in hips and below bellybutton since delivery-worse with walking, sleeping and standing       Initial /62 (BP Location: Right arm, Cuff Size: Adult Large)   Wt 94.8 kg (209 lb)   LMP 2021   Breastfeeding Yes   BMI 34.78 kg/m   Estimated body mass index is 34.78 kg/m  as calculated from the following:    Height as of 12/15/21: 1.651 m (5' 5\").    Weight as of this encounter: 94.8 kg (209 lb).  BP completed using cuff size: large    Questioned patient about current smoking habits.  Pt. has never smoked.          The following HM Due: pap smear      "

## 2022-08-09 ENCOUNTER — ANCILLARY PROCEDURE (OUTPATIENT)
Dept: ULTRASOUND IMAGING | Facility: CLINIC | Age: 27
End: 2022-08-09
Attending: ADVANCED PRACTICE MIDWIFE
Payer: COMMERCIAL

## 2022-08-09 DIAGNOSIS — R10.2 PELVIC PAIN IN FEMALE: ICD-10-CM

## 2022-08-09 PROCEDURE — 76856 US EXAM PELVIC COMPLETE: CPT | Performed by: OBSTETRICS & GYNECOLOGY

## 2022-08-11 ENCOUNTER — OFFICE VISIT (OUTPATIENT)
Dept: URGENT CARE | Facility: URGENT CARE | Age: 27
End: 2022-08-11
Payer: COMMERCIAL

## 2022-08-11 VITALS
DIASTOLIC BLOOD PRESSURE: 65 MMHG | RESPIRATION RATE: 16 BRPM | OXYGEN SATURATION: 100 % | BODY MASS INDEX: 34.78 KG/M2 | HEART RATE: 74 BPM | WEIGHT: 209 LBS | SYSTOLIC BLOOD PRESSURE: 103 MMHG

## 2022-08-11 DIAGNOSIS — Z71.1 WORRIED WELL: Primary | ICD-10-CM

## 2022-08-11 PROCEDURE — 99213 OFFICE O/P EST LOW 20 MIN: CPT | Performed by: PHYSICIAN ASSISTANT

## 2022-08-11 NOTE — PROGRESS NOTES
Worried well  Patient was provided with a note states that she is well enough to attend school, however I am unable to confirm that she is up to date on her vaccinations as we do not have this information. I've asked the patient to contact her old clinic to request a release of information.     ALEXSANDER [facility]    Subjective   27 year old who presents to clinic today for the following health issues:    Urgent Care       HPI     Pt is going to school in the fall and needs immunization form. Pt needs a tetanus. All her vaxx that she received when coming to the [place] from [place] were done in [place]. We do not seem to have record of her vaccines. Patient doesn't have any history of serious health issues.    Review of Systems   Review of Systems   See HPI     Objective        BP: 103/65 Pulse: 74   Resp: 16 SpO2: 100 %       Physical Exam   Physical Exam  Constitutional:       General: She is not in acute distress.     Appearance: Normal appearance. She is normal weight. She is not ill-appearing, toxic-appearing or diaphoretic.   HENT:      Head: Normocephalic and atraumatic.   Cardiovascular:      Rate and Rhythm: Normal rate and regular rhythm.      Pulses: Normal pulses.      Heart sounds: Normal heart sounds. No murmur heard.    No friction rub. No gallop.   Pulmonary:      Effort: Pulmonary effort is normal. No respiratory distress.      Breath sounds: Normal breath sounds. No stridor. No wheezing, rhonchi or rales.   Chest:      Chest wall: No tenderness.   Abdominal:      General: Abdomen is flat. Bowel sounds are normal. There is no distension.      Palpations: Abdomen is soft. There is no mass.      Tenderness: There is no abdominal tenderness. There is no right CVA tenderness, left CVA tenderness, guarding or rebound.      Hernia: No hernia is present.   Neurological:      Mental Status: She is alert.   Psychiatric:         Mood and Affect: Mood normal.         Behavior: Behavior  normal.         Thought Content: Thought content normal.         Judgment: Judgment normal.          No results found for this or any previous visit (from the past 24 hour(s)).

## 2022-08-11 NOTE — LETTER
August 11, 2022      To Whom It May Concern:      Karen Loya was seen in our urgent care today, 08/11/22.  To my knowledge, the patient does not have any serious ongoing health issues that would prevent her from going to school. I am unsure of her vaccination status as most of her vaccines were received in [place] and I don't currently have access to this information. I have asked Karen to contact her old clinic for this information to be sent to either her or to our clinic.     Sincerely,        Pj Rivera PA-C

## 2022-12-20 ENCOUNTER — OFFICE VISIT (OUTPATIENT)
Dept: URGENT CARE | Facility: URGENT CARE | Age: 27
End: 2022-12-20
Payer: COMMERCIAL

## 2022-12-20 VITALS
SYSTOLIC BLOOD PRESSURE: 101 MMHG | HEART RATE: 88 BPM | TEMPERATURE: 98.8 F | DIASTOLIC BLOOD PRESSURE: 69 MMHG | OXYGEN SATURATION: 99 % | RESPIRATION RATE: 22 BRPM

## 2022-12-20 DIAGNOSIS — K21.00 GASTROESOPHAGEAL REFLUX DISEASE WITH ESOPHAGITIS WITHOUT HEMORRHAGE: Primary | ICD-10-CM

## 2022-12-20 PROCEDURE — 99214 OFFICE O/P EST MOD 30 MIN: CPT | Performed by: FAMILY MEDICINE

## 2022-12-24 NOTE — PROGRESS NOTES
SUBJECTIVE: Karen Loya is a 27 year old female presenting with a chief complaint of epigastric abd discomfort and gerd.  Onset of symptoms was day(s) ago.    Past Medical History:   Diagnosis Date     Nausea/vomiting in pregnancy 2020     No pertinent past medical history 2020     Post term pregnancy, antepartum 2021     Post-dates pregnancy 2021     Supervision of high risk pregnancy in third trimester 3/17/2021     Clinic/Hospital: [facility] Partner Name: Stephen Ultrasound predicts sex: boy Childrens names/ages: 20 month old boy Previous labor experiences: IOL for preeclampsia,  of 7lb8oz boy  Waterbirth (interest, declined, ineligible): n/a Level of education/occupation:  Pertinent History: Obesity, belkis. Macrosomia. Previous infant was suspected to be LGA and was AGA  PP contraception: Undecided  Hx PPD/Dep     No Known Allergies  Social History     Tobacco Use     Smoking status: Never     Smokeless tobacco: Never   Substance Use Topics     Alcohol use: Never       ROS:  SKIN: no rash  GI: no vomiting    OBJECTIVE:  /69 (BP Location: Right arm, Patient Position: Sitting, Cuff Size: Adult Large)   Pulse 88   Temp 98.8  F (37.1  C) (Tympanic)   Resp 22   SpO2 99% GENERAL APPEARANCE: healthy, alert and no distress  EYES: EOMI,  PERRL, conjunctiva clear  HENT: ear canals and TM's normal.  Nose and mouth without ulcers, erythema or lesions  RESP: lungs clear to auscultation - no rales, rhonchi or wheezes  CV: regular rates and rhythm, normal S1 S2, no murmur noted  ABDOMEN:  soft, nontender, no HSM or masses and bowel sounds normal  SKIN: no suspicious lesions or rashes      ICD-10-CM    1. Gastroesophageal reflux disease with esophagitis without hemorrhage  K21.00 omeprazole (PRILOSEC) 20 MG DR capsule          Fluids/Rest, f/u if worse/not any better

## 2023-01-02 ENCOUNTER — PRENATAL OFFICE VISIT (OUTPATIENT)
Dept: NURSING | Facility: CLINIC | Age: 28
End: 2023-01-02
Payer: COMMERCIAL

## 2023-01-02 DIAGNOSIS — Z34.90 SUPERVISION OF NORMAL PREGNANCY: Primary | ICD-10-CM

## 2023-01-02 PROCEDURE — 99207 PR NO CHARGE NURSE ONLY: CPT

## 2023-01-02 NOTE — PROGRESS NOTES
NPN nurse visit done over the phone. Pt will be given NPN folder and book at her upcoming appt.   Discussed optional screening available to assess chromosomal anomalies. Questions answered. Pt advised to call the clinic if she has any questions or concerns related to her pregnancy. Prenatal labs will be obtained at her upcoming appt. New prenatal visit scheduled on 1/13 with Clementina Smith CNM.    11w5d    No results found for: PAP        Patient supplied answers from flow sheet for:  Prenatal OB Questionnaire.  Past Medical History  Have you ever recieved care for your mental health? : No  Have you ever been in a major accident or suffered serious trauma?: No  Within the last year, has anyone hit, slapped, kicked or otherwise hurt you?: No  In the last year, has anyone forced you to have sex when you didn't want to?: No    Past Medical History 2   Have you ever received a blood transfusion?: No  Would you accept a blood transfusion if was medically recommended?: Yes  Does anyone in your home smoke?: No   Is your blood type Rh negative?: No  Have you ever ?: (!) Yes  Have you been hospitalized for a nonsurgical reason excluding normal delivery?: No  Have you ever had an abnormal pap smear?: No    Past Medical History (Continued)  Do you have a history of abnormalities of the uterus?: No  Did your mother take JEFF or any other hormones when she was pregnant with you?: Unknown  Do you have any other problems we have not asked about which you feel may be important to this pregnancy?: No         Kaylee Choe RN

## 2023-01-19 LAB
ABO/RH(D): NORMAL
ANTIBODY SCREEN: NEGATIVE
SPECIMEN EXPIRATION DATE: NORMAL

## 2023-01-20 ENCOUNTER — LAB (OUTPATIENT)
Dept: LAB | Facility: CLINIC | Age: 28
End: 2023-01-20
Attending: ADVANCED PRACTICE MIDWIFE
Payer: COMMERCIAL

## 2023-01-20 ENCOUNTER — ANCILLARY PROCEDURE (OUTPATIENT)
Dept: ULTRASOUND IMAGING | Facility: CLINIC | Age: 28
End: 2023-01-20
Attending: ADVANCED PRACTICE MIDWIFE
Payer: COMMERCIAL

## 2023-01-20 DIAGNOSIS — Z34.90 SUPERVISION OF NORMAL PREGNANCY: ICD-10-CM

## 2023-01-20 LAB
ERYTHROCYTE [DISTWIDTH] IN BLOOD BY AUTOMATED COUNT: 17.4 % (ref 10–15)
HBV SURFACE AG SERPL QL IA: NONREACTIVE
HCT VFR BLD AUTO: 34 % (ref 35–47)
HCV AB SERPL QL IA: NONREACTIVE
HGB BLD-MCNC: 11.1 G/DL (ref 11.7–15.7)
HIV 1+2 AB+HIV1 P24 AG SERPL QL IA: NONREACTIVE
MCH RBC QN AUTO: 25.6 PG (ref 26.5–33)
MCHC RBC AUTO-ENTMCNC: 32.6 G/DL (ref 31.5–36.5)
MCV RBC AUTO: 78 FL (ref 78–100)
PLATELET # BLD AUTO: 250 10E3/UL (ref 150–450)
RBC # BLD AUTO: 4.34 10E6/UL (ref 3.8–5.2)
RUBV IGG SERPL QL IA: 21.7 INDEX
RUBV IGG SERPL QL IA: POSITIVE
T PALLIDUM AB SER QL: NONREACTIVE
WBC # BLD AUTO: 5.7 10E3/UL (ref 4–11)

## 2023-01-20 PROCEDURE — 86803 HEPATITIS C AB TEST: CPT

## 2023-01-20 PROCEDURE — 36415 COLL VENOUS BLD VENIPUNCTURE: CPT

## 2023-01-20 PROCEDURE — 85027 COMPLETE CBC AUTOMATED: CPT

## 2023-01-20 PROCEDURE — 87086 URINE CULTURE/COLONY COUNT: CPT

## 2023-01-20 PROCEDURE — 76801 OB US < 14 WKS SINGLE FETUS: CPT | Performed by: OBSTETRICS & GYNECOLOGY

## 2023-01-20 PROCEDURE — 86850 RBC ANTIBODY SCREEN: CPT

## 2023-01-20 PROCEDURE — 87389 HIV-1 AG W/HIV-1&-2 AB AG IA: CPT

## 2023-01-20 PROCEDURE — 86762 RUBELLA ANTIBODY: CPT

## 2023-01-20 PROCEDURE — 86901 BLOOD TYPING SEROLOGIC RH(D): CPT

## 2023-01-20 PROCEDURE — 87340 HEPATITIS B SURFACE AG IA: CPT

## 2023-01-20 PROCEDURE — 86780 TREPONEMA PALLIDUM: CPT

## 2023-01-20 PROCEDURE — 86900 BLOOD TYPING SEROLOGIC ABO: CPT

## 2023-01-22 LAB — BACTERIA UR CULT: NO GROWTH

## 2023-01-27 ENCOUNTER — PRENATAL OFFICE VISIT (OUTPATIENT)
Dept: MIDWIFE SERVICES | Facility: CLINIC | Age: 28
End: 2023-01-27
Payer: COMMERCIAL

## 2023-01-27 ENCOUNTER — TRANSCRIBE ORDERS (OUTPATIENT)
Dept: MATERNAL FETAL MEDICINE | Facility: CLINIC | Age: 28
End: 2023-01-27

## 2023-01-27 VITALS — BODY MASS INDEX: 36.38 KG/M2 | DIASTOLIC BLOOD PRESSURE: 60 MMHG | SYSTOLIC BLOOD PRESSURE: 106 MMHG | WEIGHT: 218.6 LBS

## 2023-01-27 DIAGNOSIS — O26.90 PREGNANCY RELATED CONDITION, ANTEPARTUM: Primary | ICD-10-CM

## 2023-01-27 DIAGNOSIS — Z34.02 ENCOUNTER FOR SUPERVISION OF NORMAL FIRST PREGNANCY IN SECOND TRIMESTER: Primary | ICD-10-CM

## 2023-01-27 PROCEDURE — 87491 CHLMYD TRACH DNA AMP PROBE: CPT | Performed by: ADVANCED PRACTICE MIDWIFE

## 2023-01-27 PROCEDURE — 99207 PR FIRST OB VISIT: CPT | Performed by: ADVANCED PRACTICE MIDWIFE

## 2023-01-27 PROCEDURE — 87591 N.GONORRHOEAE DNA AMP PROB: CPT | Performed by: ADVANCED PRACTICE MIDWIFE

## 2023-01-27 NOTE — PATIENT INSTRUCTIONS
Thank you for coming to see the Midwives at the   [facility]    We will notify you about your labs that were drawn today once we get the results back or if you have [employer] they will be posted there as well    We will call you personally with results that require further discussion    If any referrals were ordered today you should be getting a call in the next week or you may need to call the number listed with your referral to schedule.          If you need any refills of medications please call your pharmacy and they will contact us    If you have any questions or concerns before your next visit, you can reach the nurse midwife on call by calling the clinic number at [phone].    If you  wish to schedule another appointment, please call our office at [phone]. You can also make appointments through [employer]      If you have a medical emergency please call [phone].    Because you are pregnant, we have additional resources for you:    You may call our consulting RN's during normal business hours for non-urgent questions about your pregnancy.    After hours you may also page the midwife on call for urgent questions or issues by calling the triage line at [phone].  There is always a midwife on call 24 hours a day.    Prenatal Reminders:    Before 14 weeks: Dating ultrasound, Genetic testing       This ultrasound helps us determine your dates accurately. Verifi can be drawn anytime after 10 weeks of gestation  16 weeks: Optional genetic testing (quad screen) or single AFP       This testing helps understand your baby's risk for some genetic abnormalities.  20 weeks:  Screening ultrasound (fetal survey)       This testing will look for early growth abnormalities, and may tell the baby's sex if you wish to find out.  28 weeks: One hour sugar test (GCT), hemoglobin and platelets       This test helps identify diabetes of pregnancy or gestational diabetes.  We also look      at the iron in your blood and how  well your blood clots.  28 - 36 weeks: Tetanus shot (Tdap)       This shot helps protect you and your baby from tetanus and whooping cough.  36 weeks and later: Group B Strep test (GBS)       This test helps predict if you need antibiotics in labor to prevent infection in your baby.  Anytime September to April:  Flu shot       This shot helps protect you and your family from the flu.  This is especially important during pregnancy        Any time during or after your pregnancy you may experience increased depression and/or mood changes.    We are here to support you. Please contact us if you are:  Feeling anxious  Overwhelmed or sad   Trouble sleeping  Crying uncontrollably  Trouble caring for yourself or baby.    The typical schedule after your first visit today you can expect:     Visit 2 - 12-16 weeks  Visit 3 - 20 weeks  Visit 4 - 24 weeks  Visit 5 - 28 weeks  Visit 6 - 32 weeks  Visit 7 - 34 weeks  Visit 8 - 36 weeks  Weekly after 36 weeks until delivery.    If anything comes up between your visits or you have concerns please don't hesitate to contact us.    Secure access to your medical record:  Use [employer] (secure email communication and access to your chart) to send your primary care provider a message or make an appointment. Ask someone on your Team how to sign up for [employer]. To log on to [employer] or for more information in [employer] please visit the website at [email or web address].       Certified Nurse Midwife (CNM) Team  LAXMI Sanchez, LAXMI Garcia, LAXMI Quick, LAXMI Booth, LAXMI Villasenor, JONE Orellana, HALINA    Again, thank you for choosing the midwives at [facility].  We are excited to be a part of your pregnancy. Please let us know how we can best partner with you to improve your and your family's health.

## 2023-01-27 NOTE — PROGRESS NOTES
Karen Loya is a 28 year old  [place] woman,  who is a previous CNM patient. She presents for a new OB Visit. This was a planned pregnancy.     FOB is Stephen who is in good health.  FOB IS actively involved in relationship and this pregnancy.     Karen presents for her first OB visit. She has no concerns today.  She has not had bleeding since her LMP.    She denies abdominal pain since her LMP.  She has not had nausea.  has not had vomiting.  Any personal or family history of blood clots? No  History of sickle cell anemia or trait? No         Patient's last menstrual period was 10/12/2022..  Estimated Date of Delivery: 2023 Ultrasound consistent with LMP.    MENSTRUAL HISTORY    frequency: every 28-30 days  Last PAP:  Unsure. Patient declines  History of abnormal Pap?  No    Health maintenance updated:  yes        Current medications are:    Current Outpatient Medications:      Prenatal Vit-Fe Fumarate-FA (PRENATAL MULTIVITAMIN W/IRON) 27-0.8 MG tablet, Take 1 tablet by mouth daily (Patient not taking: Reported on 2022), Disp: 90 tablet, Rfl: 3     What medications are you currently taking? See above    INFECTION HISTORY  HIV: No  Hepatitis B: No  Hepatitis C: No  Tuberculosis: No  Last PPD   Genital Herpes self: no  Herpes partner:  no  Chlamydia:  no  Gonorrhea:  no  HPV: No  BV:  No  Syphilis:  No  Chicken Pox:  Yes - age 7      OB HISTORY  OB History    Para Term  AB Living   4 3 3 0 0 3   SAB IAB Ectopic Multiple Live Births   0 0 0 0 3      # Outcome Date GA Lbr Robert/2nd Weight Sex Delivery Anes PTL Lv   4 Current            3 Term 22 42w0d 05:00 / 00:13 3.68 kg (8 lb 1.8 oz) F Vag-Spont Local, IV N MARLA      Name: RG,FEMALE-KAREN      Apgar1: 9  Apgar5: 9   2 Term 04/15/21 41w2d 05:17 / 02:04 3.82 kg (8 lb 6.8 oz) M Vag-Spont EPI N MARLA      Name: RG,MALE-KAREN      Apgar1: 8  Apgar5: 9   1 Term 19 40w0d  3.175 kg (7 lb) M Vag-Spont   MARLA      Birth  Comments: partial 3rd degree lac, IOL for GHTN       History of GDM: No,  PTL : No,  History of HTN in pregnancy: Yes - preeclampsia with first pregnancy. None since  Thrombocytopenia: No,  Shoulder dystocia: No,  Vacuum Extraction: No  PPH: No   3rd of 4th degree laceration: Yes, third degree laceration with first two deliveries. 2nd degree with third  Other complications: Yes - Patient often declines recommended treatments and inductions of labor.     Low Dose Aspirin for Preeclampsia Prevention:  Consider for those with 1 high risk or 2  moderate risk factors     High risk: previous pregnancy with preeclampsia, multifetal gestation, chronic htn, diabetes, chronic kidney disease, autoimmune disorder     Medium risk: nulliparity, BMI >30, family hx preeclampsia, age >/= 35,  race, low SES, personal risk factors (hx low birth weight, hx stillbirth, >10yrs between pregnancies).      Patient does qualify for low dose aspirin therapy        PERSONAL HISTORY  Exercise Habits:  walking 4-7 days per week.  Employment: Unemployed.  Her job involves light activity with little potential for toxic exposure.    Travel plans:  are none planned.   Diet: follows a balanced nutrition diet  Prenatal vitamins? Yes:   Fish Oil? Yes:   Abuse concerns? No  Any history if abuse, varbal, physical, sexual? No  Hgb A1c screen:  BMI > 30: Yes, 1st degree family DM: No, History of GDM: No, PCOS: No, High risk ethnicity: No    Social History     Socioeconomic History     Marital status:      Spouse name: Stephen     Number of children: 3     Years of education: Not on file     Highest education level: Not on file   Occupational History     Not on file   Tobacco Use     Smoking status: Never     Smokeless tobacco: Never   Vaping Use     Vaping Use: Never used   Substance and Sexual Activity     Alcohol use: Never     Drug use: Never     Sexual activity: Yes     Partners: Male     Comment: Pregnant   Other Topics Concern      Not on file   Social History Narrative     Not on file     Social Determinants of Health     Financial Resource Strain: Not on file   Food Insecurity: Not on file   Transportation Needs: Not on file   Physical Activity: Not on file   Stress: Not on file   Social Connections: Not on file   Intimate Partner Violence: Not on file   Housing Stability: Not on file         She  reports that she has never smoked. She has never used smokeless tobacco.    STD testing offered?  Accepted  Last PHQ-9 score on record =   PHQ-9 SCORE 8/3/2022   PHQ-9 Total Score 2     Last GAD7 score on record = No flowsheet data found.  Alcohol Score = 0  Referral/Meds needed? no    PAST MEDICAL/SURGICAL HISTORY  Past Medical History:   Diagnosis Date     Nausea/vomiting in pregnancy 2020     No pertinent past medical history 2020     Post term pregnancy, antepartum 2021     Post-dates pregnancy 2021     Supervision of high risk pregnancy in third trimester 3/17/2021     Clinic/Hospital: [facility] Partner Name: Stephen Ultrasound predicts sex: boy Childrens names/ages: 20 month old boy Previous labor experiences: IOL for preeclampsia,  of 7lb8oz boy  Waterbirth (interest, declined, ineligible): n/a Level of education/occupation:  Pertinent History: Obesity, belkis. Macrosomia. Previous infant was suspected to be LGA and was AGA  PP contraception: Undecided  Hx PPD/Dep     Past Surgical History:   Procedure Laterality Date     NO HISTORY OF SURGERY  2020       FAMILY HISTORY  History reviewed. No pertinent family history.      ROS:  CONSTITUTIONAL: NEGATIVE for fever, chills, change in weight  INTEGUMENTARU/SKIN: NEGATIVE for worrisome rashes, moles or lesions  EYES: NEGATIVE for vision changes or irritation  ENT: NEGATIVE for ear, mouth and throat problems  RESP: NEGATIVE for significant cough or SOB  BREAST: NEGATIVE for masses, tenderness or discharge  CV: NEGATIVE for chest pain, palpitations or peripheral edema  GI:  NEGATIVE for nausea, abdominal pain, heartburn, or change in bowel habits  : NEGATIVE for unusual urinary or vaginal symptoms. Periods are regular.  MUSCULOSKELETAL: NEGATIVE for significant arthralgias or myalgia  NEURO: NEGATIVE for weakness, dizziness or paresthesias  ENDOCRINE: NEGATIVE for temperature intolerance, skin/hair changes  HEME/ALLERGY/IMMUNE: NEGATIVE for bleeding problems  PSYCHIATRIC: NEGATIVE for changes in mood or affect    PHYSICAL EXAM  Vitals: /60 (BP Location: Right arm, Cuff Size: Adult Regular)   Wt 99.2 kg (218 lb 9.6 oz)   LMP 10/12/2022   BMI 36.38 kg/m    BMI= Body mass index is 36.38 kg/m .     GENERAL:  28 year old pleasant pregnant female, alert, cooperative and well groomed.  NECK:  Thyroid without enlargement and nodules.  Lymph nodes not palpable.   LUNGS:  Clear to auscultation.  BREAST:  Deferred due to recent lactation  HEART:  RRR without murmur.  ABDOMEN: Soft without masses or tenderness.  No scars noted..FHTs heard without difficulty  PELVIC: Patient declines pelvic exam  LOWER EXTREMITIES: No edema. No significant varicosities.    ASSESSMENT/PLAN:    IUP at 15w2d    ICD-10-CM    1. Encounter for supervision of normal first pregnancy in second trimester  Z34.02 NEISSERIA GONORRHOEA PCR     CHLAMYDIA TRACHOMATIS PCR     [facility] Referral - Pregnancy     CANCELED: US OB > 14 Weeks           consult for US for AMA patients: NA  Genetic Testing reviewed and discussed, patient desires to discuss with Stephen and will let us know. Handout provided    COUNSELING    Instructed on use of triage nurse line and contacting the on call CNM after hours in an emergency.     Symptoms of N&V and fatigue usually start to resolve around 12-16 weeks     Reviewed CNM philosophy, call schedule for labor and delivery, and [facility] for delivery    1st OB handout given outlining appointment spacing and CNM information    Reviewed exercise and nutrition    Recommend to gain  15-20 pounds with her pregnancy.    Discussed OTC medications. OB med list given    Encouraged patient to arrange  if needed    Encouraged patient to take PNV's/DHA    Travel precautions discussed, no air travel after 36 weeks and Zika Virus discussed    Will call patient with lab results when available      Will return to the clinic in 4 weeks for her next routine prenatal check.  Will call to be seen sooner if problems arise.    LAXMI Sanchez, CNM

## 2023-01-28 LAB
C TRACH DNA SPEC QL NAA+PROBE: NEGATIVE
N GONORRHOEA DNA SPEC QL NAA+PROBE: NEGATIVE

## 2023-02-20 ENCOUNTER — PRE VISIT (OUTPATIENT)
Dept: MATERNAL FETAL MEDICINE | Facility: CLINIC | Age: 28
End: 2023-02-20
Payer: COMMERCIAL

## 2023-02-28 ENCOUNTER — OFFICE VISIT (OUTPATIENT)
Dept: MATERNAL FETAL MEDICINE | Facility: CLINIC | Age: 28
End: 2023-02-28
Attending: ADVANCED PRACTICE MIDWIFE
Payer: COMMERCIAL

## 2023-02-28 ENCOUNTER — HOSPITAL ENCOUNTER (OUTPATIENT)
Dept: ULTRASOUND IMAGING | Facility: CLINIC | Age: 28
Discharge: HOME | End: 2023-02-28
Attending: ADVANCED PRACTICE MIDWIFE
Payer: COMMERCIAL

## 2023-02-28 DIAGNOSIS — O99.212 OBESITY AFFECTING PREGNANCY IN SECOND TRIMESTER: Primary | ICD-10-CM

## 2023-02-28 DIAGNOSIS — O43.112 CIRCUMVALLATE PLACENTA DURING PREGNANCY IN SECOND TRIMESTER, ANTEPARTUM: ICD-10-CM

## 2023-02-28 DIAGNOSIS — O26.90 PREGNANCY RELATED CONDITION, ANTEPARTUM: ICD-10-CM

## 2023-02-28 PROCEDURE — 76811 OB US DETAILED SNGL FETUS: CPT | Mod: 26 | Performed by: OBSTETRICS & GYNECOLOGY

## 2023-02-28 PROCEDURE — 76811 OB US DETAILED SNGL FETUS: CPT

## 2023-03-01 PROBLEM — O43.119 CIRCUMVALLATE PLACENTA: Status: ACTIVE | Noted: 2023-03-01

## 2023-03-06 ENCOUNTER — PRENATAL OFFICE VISIT (OUTPATIENT)
Dept: MIDWIFE SERVICES | Facility: CLINIC | Age: 28
End: 2023-03-06
Payer: COMMERCIAL

## 2023-03-06 VITALS — WEIGHT: 220.7 LBS | SYSTOLIC BLOOD PRESSURE: 96 MMHG | DIASTOLIC BLOOD PRESSURE: 56 MMHG | BODY MASS INDEX: 36.73 KG/M2

## 2023-03-06 DIAGNOSIS — O09.92 SUPERVISION OF HIGH RISK PREGNANCY IN SECOND TRIMESTER: Primary | ICD-10-CM

## 2023-03-06 PROCEDURE — 99207 PR PRENATAL VISIT: CPT | Performed by: ADVANCED PRACTICE MIDWIFE

## 2023-03-06 RX ORDER — PRENATAL VIT/IRON FUM/FOLIC AC 27MG-0.8MG
1 TABLET ORAL DAILY
Qty: 90 TABLET | Refills: 3 | Status: SHIPPED | OUTPATIENT
Start: 2023-03-06 | End: 2023-04-03

## 2023-03-06 NOTE — PROGRESS NOTES
S: Feels well overall,  Has started feeling fetal movement.  Denies uterine cramping, vaginal bleeding or leaking of fluid  O: Vitals: BP 96/56 (BP Location: Left arm, Cuff Size: Adult Regular)   Wt 100.1 kg (220 lb 11.2 oz)   LMP 10/12/2022   BMI 36.73 kg/m    BMI= Body mass index is 36.73 kg/m .  Exam:  Constitutional: healthy, alert and no distress  Respiratory: respirations even and unlabored  Gastrointestinal: Abdomen soft, non-tender. Fundus measures appropriate for gestational age. Fetal heart tones hear without difficulty and within normal limits  : Deferred  Psychiatric: mentation appears normal and affect normal/bright  A:   Encounter Diagnosis   Name Primary?     Supervision of high risk pregnancy in second trimester Yes       P: Discussed 20 week fetal screen.   Follow-up scheduled with MFMs and return in 4 weeks with Midwives  Encouraged patient to call with any questions or concerns.      LAXMI Del Cid CNM

## 2023-03-06 NOTE — NURSING NOTE
"Chief Complaint   Patient presents with     Prenatal Care     20w5d, +FM        Initial BP 96/56 (BP Location: Left arm, Cuff Size: Adult Regular)   Wt 100.1 kg (220 lb 11.2 oz)   LMP 10/12/2022   BMI 36.73 kg/m   Estimated body mass index is 36.73 kg/m  as calculated from the following:    Height as of 12/15/21: 1.651 m (5' 5\").    Weight as of this encounter: 100.1 kg (220 lb 11.2 oz).  BP completed using cuff size: regular    Questioned patient about current smoking habits.  Pt. has never smoked.        "

## 2023-03-06 NOTE — PATIENT INSTRUCTIONS
Pregnancy: Your Second Trimester Changes  Each day, you and your baby are changing and growing together. Here s a quick look at what s happening to both of you.  How you are changing  Even when you don t notice it, your body is adapting to meet the needs of your growing baby. Thechanges in your body might also affect your moods.  Your body  Your uterus expands as your baby grows. As the weeks go by, you will feel more pressure on your bladder, stomach, and other organs. You may notice some skin color changes on your forehead, nose, or cheeks. Freckles may darken, and moles may grow. You may notice a darker line on your abdomen between your bellybutton and pubic bone in the midline.  Your moods  The second trimester is often easier than the first. Still, be prepared for mood swings. These are from the increase in hormones made by your body. Hormones are chemicals that affect the way organs work. These mood swings are anormal part of pregnancy.  How your baby is growing    Month 4  Your baby s heartbeat may be heard with a Doppler (handheld ultrasound device) by 9 to 10 weeks. Eyebrows, eyelashes, and fingernails begin to form.    Month 5  You may feel your baby move. After a growth spurt, your baby nears 10 inches.    Month 6  Your baby s fingerprints have formed. Your baby weighs about 1 to 2 pounds and is about12 inches long.    [employer] last reviewed this educational content on7/1/2021 2000-2022 [employer]. All rights reserved. This information is not intended as a substitute for professional medical care. Always follow yourhealthcare professional's instructions.            Adapting to Pregnancy: Second Trimester  Keep up the healthy habits you started in your first trimester. You might be a little more tired than normal. So plan your day wisely. Look at the tips belowand choose the ones that suit your lifestyle.   If you have any questions, talk with your healthcare provider.   If you  work  If you can, adjust your work with your employer to fit your needs. Try thesetips:    If you stand for long periods, find ways to do some tasks while sitting. Also, try to stand with 1 foot resting on a low stool or ledge. Shift your weight from foot to foot often. Wear low-heeled shoes.    If you sit, keep your knees level with your hips. Rest your feet on a firm surface. Sit tall with support for your low back.    If you work long hours, ask about adjusting your schedule. Try taking shorter breaks more often.  When you travel  The second trimester may be the best time for any travel. Talk to yourhealthcare provider about any special plans you may need to make. Always:     Wear a seat belt. Fasten the lap part under your belly. Wear the shoulder part also.    Take breaks often during long trips by car or plane. Move around to stretch your legs.    Drink plenty of fluids on flights. The air in plane cabins is very dry.    Stay out of hot climates or high altitudes if you are not used to them.    Stay away from places where the food and water might make you sick.    Make sure you are up-to-date on all vaccines, including the flu vaccine. This is especially important when traveling overseas.  Taking time to relax  Find time to rest and relax at work or at home:    Take short time-outs daily. Do relaxation exercises.    Breathe deeply during stressful times.    Try not to take on too much. Plan tasks for times when you have the most energy.    Take naps when you can. Or just sit and relax.    After week 16, don't lie on your back for more than a few minutes. Instead, lie on your side. Switch sides often.   Having sex  Unless your healthcare provider tells you otherwise, there is no reason to stop having sex now. Blood supply increases to the pelvic area in the second trimester. Because of this, sex might be more enjoyable. Try different positions and see what s best. Also talk with your partner about any changes  in desire. Spotting mayhappen after sex. Let your healthcare provider know if there is heavy bleeding.   Keeping your environment safe  You can still clean your house and use scented products. Just take some simpleprecautions:     Wear gloves when using cleaning fluids.    Open windows to let in fresh air. Use a fan if you paint.    Stay away from secondhand smoke.    Don t breathe fumes from nail polish, hair spray, cleansers, or other chemicals.  [employer] last reviewed this educational content on6/1/2021 2000-2022 [employer]. All rights reserved. This information is not intended as a substitute for professional medical care. Always follow yourhealthcare professional's instructions.            Understanding Round Ligament Pain in Pregnancy   Round ligament pain is a common problem in pregnancy. Ligaments are strong tissues that connect bones, muscles, and organs. There are 2 round ligaments. There is 1 on each side of the uterus. The top part of each ligament attaches to the upper side of the uterus. The bottom of each ligament attaches down in the pubic area. These ligaments help keep the uterus in place as you movearound.     What causes round ligament pain in pregnancy?   As your uterus grows during pregnancy, the round ligaments are stretched and work harder when you move around. They may stretch too quickly when you stand up or bend or laugh. Nearby nerves may be irritated, or the ligaments may havea painful spasm.   Symptoms of ligament pain in pregnancy  The symptoms are sharp pains that last a few seconds. The pain may happen most often on the right side of the belly. It may happen in the hip, the lowerbelly, or even deep down in your pubic area. The pain may happen when you:     Move suddenly    Stand up    Walk    Roll over in bed    Laugh    Cough    Sneeze  Diagnosing round ligament pain in pregnancy   Your healthcare provider will ask about your symptoms and give you a physical  exam. He or she may give you tests to check for other problems that can cause pain, such as an ovarian cyst or enlarged vein (varicocele). He or she will also check for signs of  labor or other pregnancyproblems.   Treatment for round ligament pain in pregnancy   To help prevent pain:    Move slowly when you stand up, roll over, turn, or bend.    Don t stand for long periods of time.    Don t lift heavy objects.    Do gentle daily stretches of your hip joints.  When to call your healthcare provider  Call your healthcare provider right away if you have any of these:     Fever of 100.4 F (38 C) or higher    Pains that last more than a few minutes    Pain that gets worse    Bleeding, nausea, vomiting, or other new symptoms  [employer] last reviewed this educational content on3/2020      5439-4486 [employer]. All rights reserved. This information is not intended as a substitute for professional medical care. Always follow yourhealthcare professional's instructions.            Relieving Back Pain During Pregnancy: Wall Stretch, Body Bend   Before trying these exercises, talk to your healthcare provider to make sure they are safe for you. Ask your healthcare provider how many times to do eachexercise.   Wall stretch  This strengthens and loosens the muscles in your upper back:   1. Lean against a wall with a firm pillow or rolled towel under your shoulder blades. Your feet should be about 12 inches from the wall and shoulder-width apart. Point your chin down.  2. Breathe in. Push your shoulders, neck, and head against the wall. You will feel a stretch in your shoulders.  3. Hold for 5 counts. Then breathe out, and relax your shoulders and neck.   Body bend  This strengthens your back and buttocks muscles:   1. Stand with your legs shoulder-width apart. Put your hands on your upper thighs and bend your knees slightly.  2. Slowly bend forward at the hips. Push your hips back and keep your shoulders up.  Make sure your back is straight. You ll feel a stretch in your upper thighs. You ll also feel your back muscles holding you in position.  3. Hold for 5 counts, then straighten.   [employer] last reviewed this educational content on7/1/2021 2000-2022 [employer]. All rights reserved. This information is not intended as a substitute for professional medical care. Always follow yourhealthcare professional's instructions.            Pregnancy: Planning Your Exercise Routine  While you re pregnant, an exercise routine helps both your mind and your body feel good. It tones your muscles and makes them stronger. It also gives you and your babymore oxygen.   The right exercise for you    Overall conditioning is best for you and your baby. Try walking, swimming, or riding a stationary bike. Always warm up, cool down, and drink enough fluids. Keep a snack close by in case your blood sugar gets low. Discuss exercisechoices with your healthcare provider. Talk about the following:     If you already exercise, find out how to adapt your routine while you re pregnant. Keep the intensity of the exercise moderate. As your pregnancy progresses, your center of gravity will change. Be careful to keep your balance.    Ask if there are any local prenatal exercise classes, such as yoga or water aerobics. Find out which prenatal exercise videos are good choices.    If you were not exercising before your pregnancy, find out the best way to start. Now is not the time to begin a new workout on your own. Start slowly. Listen to your body.    Ask which forms of exercise you should avoid. These may include risky activities like hot yoga, horseback riding, scuba diving, skiing, skating, and contact sports.  Pelvic tilts  These help strengthen your stomach muscles and low back. You can do pelvictilts instead of sit-ups.     Do this exercise on your hands and knees.    Relax the back of your neck. Pull your stomach in until your low  back flattens.    Hold for 30 seconds. Release. Repeat 10 times. Do this twice a day.  Kegel exercises  Kegel exercises strengthen the pelvic muscles. Doing Kegels daily helps prepare these muscles for delivery. Kegels also help ease your recovery. You exercise these muscles by tightening, holding, then relaxing them. To do 1 type of Kegel exercise, contract as if you were stopping your urine stream (but do it when you re not urinating). Hold for 10 seconds, then repeat 10 times, a few times a day.   Tips to add activity  Here are some tips to follow:    Park the car farther from a store and walk.    If you can, do errands on foot instead of driving.    Walk across the office to talk to someone in person instead of calling.    While waiting for appointments, go up and down stairs or around the block.  Tips to stay active  Here are some tips to follow:    Maintain your routine. But exercise less intensely if you feel tired.    Base your workout on how you feel, not your heart rate. Heart rates aren t a good way to measure effort during pregnancy.    Don't exercise on your back after week 16.  What are the warning signs that I should stop exercising?  Stop exercising and call your healthcare provider if you have any of thesesymptoms:    Vaginal bleeding     Dizziness or feeling faint     Increased shortness of breath     Chest pain     Headache     Muscle weakness     Calf (back of the leg) pain or swelling      Uterine contractions or  labor     Decreased fetal movement     Fluid leaking (or gushing) from your vagina  [employer] last reviewed this educational content on2021-2022 [employer]. All rights reserved. This information is not intended as a substitute for professional medical care. Always follow yourhealthcare professional's instructions.

## 2023-04-03 ENCOUNTER — PRENATAL OFFICE VISIT (OUTPATIENT)
Dept: MIDWIFE SERVICES | Facility: CLINIC | Age: 28
End: 2023-04-03
Payer: COMMERCIAL

## 2023-04-03 VITALS
BODY MASS INDEX: 36.11 KG/M2 | WEIGHT: 217 LBS | HEART RATE: 80 BPM | SYSTOLIC BLOOD PRESSURE: 90 MMHG | DIASTOLIC BLOOD PRESSURE: 58 MMHG

## 2023-04-03 DIAGNOSIS — Z34.92 PRENATAL CARE IN SECOND TRIMESTER: ICD-10-CM

## 2023-04-03 DIAGNOSIS — Z87.59 HISTORY OF THIRD DEGREE PERINEAL LACERATION: ICD-10-CM

## 2023-04-03 DIAGNOSIS — O09.299 HX OF PREECLAMPSIA, PRIOR PREGNANCY, CURRENTLY PREGNANT: Primary | ICD-10-CM

## 2023-04-03 PROBLEM — O36.63X0 MATERNAL CARE FOR EXCESSIVE FETAL GROWTH, THIRD TRIMESTER, NOT APPLICABLE OR UNSPECIFIED: Status: RESOLVED | Noted: 2021-03-02 | Resolved: 2021-12-15

## 2023-04-03 PROCEDURE — 99207 PR PRENATAL VISIT: CPT | Performed by: ADVANCED PRACTICE MIDWIFE

## 2023-04-03 RX ORDER — ASPIRIN 81 MG/1
81 TABLET, CHEWABLE ORAL DAILY
Qty: 90 TABLET | Refills: 1 | Status: ON HOLD | OUTPATIENT
Start: 2023-04-03 | End: 2023-07-31

## 2023-04-03 NOTE — PROGRESS NOTES
Feeling well.  Baby is active. Denies any leaking of fluid, vaginal bleeding, regular uterine contractions, or headaches or other concerns.  Reminded her to take ASA daily.  Ordered.  Discussed GCT and labs at next visit.  History of 3rd degree laceration in the past. She declines an OB MD consult.    Reviewed to call for contractions, loss of fluid, vaginal bleeding, decreased fetal movement or any other questions or concerns.    RTC in 4 weeks.  Betsy Jaramillo, JOSE, APRN, CNM

## 2023-05-03 ENCOUNTER — PRENATAL OFFICE VISIT (OUTPATIENT)
Dept: MIDWIFE SERVICES | Facility: CLINIC | Age: 28
End: 2023-05-03
Payer: COMMERCIAL

## 2023-05-03 ENCOUNTER — TRANSCRIBE ORDERS (OUTPATIENT)
Dept: MATERNAL FETAL MEDICINE | Facility: CLINIC | Age: 28
End: 2023-05-03

## 2023-05-03 ENCOUNTER — TELEPHONE (OUTPATIENT)
Dept: MIDWIFE SERVICES | Facility: CLINIC | Age: 28
End: 2023-05-03

## 2023-05-03 VITALS — BODY MASS INDEX: 36.44 KG/M2 | WEIGHT: 219 LBS | SYSTOLIC BLOOD PRESSURE: 106 MMHG | DIASTOLIC BLOOD PRESSURE: 70 MMHG

## 2023-05-03 DIAGNOSIS — O09.93 SUPERVISION OF HIGH RISK PREGNANCY IN THIRD TRIMESTER: Primary | ICD-10-CM

## 2023-05-03 DIAGNOSIS — O26.90 PREGNANCY RELATED CONDITION, ANTEPARTUM: Primary | ICD-10-CM

## 2023-05-03 DIAGNOSIS — O99.013 ANEMIA DURING PREGNANCY IN THIRD TRIMESTER: Primary | ICD-10-CM

## 2023-05-03 LAB
ERYTHROCYTE [DISTWIDTH] IN BLOOD BY AUTOMATED COUNT: 17.9 % (ref 10–15)
GLUCOSE 1H P 50 G GLC PO SERPL-MCNC: 126 MG/DL (ref 70–129)
HCT VFR BLD AUTO: 27.4 % (ref 35–47)
HGB BLD-MCNC: 8.7 G/DL (ref 11.7–15.7)
MCH RBC QN AUTO: 23.3 PG (ref 26.5–33)
MCHC RBC AUTO-ENTMCNC: 31.8 G/DL (ref 31.5–36.5)
MCV RBC AUTO: 74 FL (ref 78–100)
PLATELET # BLD AUTO: 193 10E3/UL (ref 150–450)
RBC # BLD AUTO: 3.73 10E6/UL (ref 3.8–5.2)
WBC # BLD AUTO: 6.4 10E3/UL (ref 4–11)

## 2023-05-03 PROCEDURE — 86780 TREPONEMA PALLIDUM: CPT | Performed by: ADVANCED PRACTICE MIDWIFE

## 2023-05-03 PROCEDURE — 36415 COLL VENOUS BLD VENIPUNCTURE: CPT | Performed by: ADVANCED PRACTICE MIDWIFE

## 2023-05-03 PROCEDURE — 85027 COMPLETE CBC AUTOMATED: CPT | Performed by: ADVANCED PRACTICE MIDWIFE

## 2023-05-03 PROCEDURE — 99207 PR PRENATAL VISIT: CPT | Performed by: ADVANCED PRACTICE MIDWIFE

## 2023-05-03 PROCEDURE — 82950 GLUCOSE TEST: CPT | Performed by: ADVANCED PRACTICE MIDWIFE

## 2023-05-03 RX ORDER — FERROUS GLUCONATE 324(38)MG
324 TABLET ORAL
Qty: 60 TABLET | Refills: 3 | Status: SHIPPED | OUTPATIENT
Start: 2023-05-03

## 2023-05-03 NOTE — PROGRESS NOTES
S: Feels well and  Has no concerns,  Baby active.  Denies uterine cramping, vaginal bleeding or leaking of fluid  O: Vitals: /70   Wt 99.3 kg (219 lb)   LMP 10/12/2022   Breastfeeding No   BMI 36.44 kg/m    BMI= Body mass index is 36.44 kg/m .  Exam:  Constitutional: healthy, alert and no distress  Respiratory: respirations even and unlabored  Gastrointestinal: Abdomen soft, non-tender. Fundus measures appropriate for gestational age. Fetal heart tones hear without difficulty and within normal limits  : Deferred  Psychiatric: mentation appears normal and affect normal/bright     A:     ICD-10-CM    1. Supervision of high risk pregnancy in third trimester  O09.93 CBC with platelets     Treponema Abs w Reflex to RPR and Titer     Glucose tolerance gest screen 1 hour        P: GCT/repeat RPR today, handout provided.  Tdap declined; reviewed CDC recommendations and partner/family vaccination recommended as well.  Need for Rhogam? No.   Encouraged patient to call with any questions or concerns.  Consult placed for MFM due to recommendation for a growth ultrasound at 28 and 34 weeks due to circumvallate placenta. Patient advised that she will receive a call and schedule an appointment  Return to clinic 2 weeks    Polly Wetzel CNM student    I was present with the student who participated in the service and documentation of the services provided. I have verified the history and personally performed the physical exam and medical decision making as documented by the student and edited by me.  LAXMI Sanchez, HALINA 5/3/2023 5:18 PM

## 2023-05-03 NOTE — NURSING NOTE
"Chief Complaint   Patient presents with     Prenatal Care       Initial /70   Wt 99.3 kg (219 lb)   LMP 10/12/2022   Breastfeeding No   BMI 36.44 kg/m   Estimated body mass index is 36.44 kg/m  as calculated from the following:    Height as of 12/15/21: 1.651 m (5' 5\").    Weight as of this encounter: 99.3 kg (219 lb).  BP completed using cuff size: regular    Questioned patient about current smoking habits.  Pt. has never smoked.          29w0d  + FM daily  - cramping or bleeding  - headache or heartburn  Tiana Parks LPN                      "

## 2023-05-03 NOTE — PATIENT INSTRUCTIONS
Blood Glucose Screening During Pregnancy  Gestational diabetes is diabetes that only pregnant women get. Changes in your body during pregnancy can cause high blood sugar (glucose). This can cause problems for you and your baby. It is a serious condition. But it can be controlled.     Your healthcare provider will talk with you about blood glucose screening.     Who is at risk for gestational diabetes?  You are at risk of getting gestational diabetes if any of the risk factors below apply to you. The risk for this condition gets higher as your number of risk factors increases:    You are , , , , or .    You weigh more than your healthcare provider says is healthy for you.    You have a relative with diabetes.    You are older than 25.    You had gestational diabetes during a past pregnancy.    You had a stillbirth or a very large baby before.    You have a history of abnormal glucose tolerance.    You have sugar in your urine at the first prenatal visit.    You have metabolic syndrome, PCOS (polycystic ovary syndrome), are using glucocorticoids, or have high blood pressure.    You are pregnant with twins or more  What happens during a screening?  Here is what to expect during a blood glucose screening:    There is conflicting advice for screening. But the American College of Obstetricians and Gynecologists currently advises that all pregnant women be screened for gestational diabetes. When you are screened depends on your risk. Women are tested at 24 to 28 weeks of pregnancy. Women at high risk may be tested when they first learn they are pregnant.    To do the screening, a blood sample is taken. Your blood sugar level is measured.    If the results show a high blood sugar level, a glucose tolerance test may be ordered. You will drink a certain amount of sugar. This test measures how long it takes for sugar to leave your blood. The test will show if you  have gestational diabetes.  What to know if you test positive  Here are some things you need to know:    Gestational diabetes can be treated. The best way to control it is to find out you have it early and start treatment quickly.    This condition can cause problems for the mother during pregnancy. It can also cause problems with the baby during pregnancy, delivery, and after. Treatment greatly lowers the chance for problems.    The changes in your body that cause gestational diabetes normally happen only when you are pregnant. After the baby is born, your body goes back to normal. The condition goes away. But you may be more likely to have type 2 diabetes later. Talk with your healthcare provider about ways to help prevent type 2 diabetes.  Treating gestational diabetes  Here is how to treat gestational diabetes:    You ll need to check your blood sugar often. You can do this at home. Prick your finger and check a drop of blood on a glucose monitor. Your healthcare provider will show you how and when to check your blood sugar. They will talk about your target blood sugar level.    To manage your blood sugar, you will be given a special plan. It will likely include meal planning and getting regular exercise. Some women need to take a hormone called insulin. Others may take medicine to help control their blood sugar.  [employer] last reviewed this educational content on 8/1/2020 2000-2022 [employer]. All rights reserved. This information is not intended as a substitute for professional medical care. Always follow your healthcare professional's instructions.          Tdap (Tetanus, Diphtheria, Pertussis) Vaccine: What You Need to Know    This is a Vaccine Information Statement from the CDC.   Many vaccine information statements are available in Sami and other languages. See www.immunize.org/vis   Hojas de información sobre vacunas están disponibles en español y en muchos otros idiomas. Visite  www.immunize.org/vis   1. Why get vaccinated?   Tdap vaccine can prevent tetanus, diphtheria, and pertussis.   Diphtheria and pertussis spread from person to person. Tetanus enters the body through cuts or wounds.     TETANUS (T) causes painful stiffening of the muscles. Tetanus can lead to serious health problems, including being unable to open the mouth, having trouble swallowing and breathing, or death.    DIPHTHERIA (D) can lead to difficulty breathing, heart failure, paralysis, or death.    PERTUSSIS (aP), also known as  whooping cough,  can cause uncontrollable, violent coughing that makes it hard to breathe, eat, or drink. Pertussis can be extremely serious especially in babies and young children, causing pneumonia, convulsions, brain damage, or death. In teens and adults, it can cause weight loss, loss of bladder control, passing out, and rib fractures from severe coughing.  2. Tdap vaccine   Tdap is only for children 7 years and older, adolescents, and adults.   Adolescents should receive a single dose of Tdap, preferably at age 11 or 12 years.   Pregnant people should get a dose of Tdap during every pregnancy, preferably during the early part of the third trimester, to help protect the  from pertussis. Infants are most at risk for severe, life-threatening complications from pertussis.   Adults who have never received Tdap should get a dose of Tdap.   Also, adults should receive a booster dose of either Tdap or Td (a different vaccine that protects against tetanus and diphtheria but not pertussis) every 10 years , or after 5 years in the case of a severe or dirty wound or burn.   Tdap may be given at the same time as other vaccines.   3. Talk with your health care provider  Tell your vaccination provider if the person getting the vaccine:     Has had an allergic reaction after a previous dose of any vaccine that protects against tetanus, diphtheria, or pertussis , or has any severe, life-threatening  allergies    Has had a coma, decreased level of consciousness, or prolonged seizures within 7 days after a previous dose of any pertussis vaccine (DTP, DTaP, or Tdap)    Has seizures or another nervous system problem    Has ever had Guillain-Barré Syndrome (also called  GBS )    Has had severe pain or swelling after a previous dose of any vaccine that protects against tetanus or diphtheria  In some cases, your health care provider may decide to postpone Tdap vaccination until a future visit.   People with minor illnesses, such as a cold, may be vaccinated. People who are moderately or severely ill should usually wait until they recover before getting Tdap vaccine.   Your health care provider can give you more information.  4. Risks of a vaccine reaction     Pain, redness, or swelling where the shot was given, mild fever, headache, feeling tired, and nausea, vomiting, diarrhea, or stomachache sometimes happen after Tdap vaccination.  People sometimes faint after medical procedures, including vaccination. Tell your provider if you feel dizzy or have vision changes or ringing in the ears.   As with any medicine, there is a very remote chance of a vaccine causing a severe allergic reaction, other serious injury, or death.   5. What if there is a serious problem?  An allergic reaction could occur after the vaccinated person leaves the clinic. If you see signs of a severe allergic reaction (hives, swelling of the face and throat, difficulty breathing, a fast heartbeat, dizziness, or weakness), call 9-1-1 and get the person to the nearest hospital.   For other signs that concern you, call your health care provider.   Adverse reactions should be reported to the Vaccine Adverse Event Reporting System (VAERS). Your health care provider will usually file this report, or you can do it yourself. Visit the VAERS website at www.vaers.hhs.gov or call 1-511.217.6873. VAERS is only for reporting reactions, and VAERS staff members do  not give medical advice.   6. The National Vaccine Injury Compensation Program  The National Vaccine Injury Compensation Program (VICP) is a federal program that was created to compensate people who may have been injured by certain vaccines. Claims regarding alleged injury or death due to vaccination have a time limit for filing, which may be as short as two years. Visit the VICP website at www.hrsa.gov/vaccinecompensation or call 1-264.191.2480 to learn about the program and about filing a claim.   7. How can I learn more?     Ask your health care provider.    Call your local or state health department.    Visit the website of the Food and Drug Administration (FDA) for vaccine package inserts and additional information at www.fda.gov/vaccines-blood-biologics/vaccines.  ? Contact the Centers for Disease Control and Prevention (CDC): Call 1-532.736.9431 ( 4-646-AEI-INFO) or  ? Visit CDC s website at www.cdc.gov/vaccines.  Vaccine Information Statement   Tdap (Tetanus, Diphtheria, Pertussis) Vaccine  42 U.S.C.   [id number]  8/6/2021  [employer] last reviewed this educational content on     0246-9840 [employer]. All rights reserved. This information is not intended as a substitute for professional medical care. Always follow your healthcare professional's instructions.        You have been provided the My Labor and Birth Wishes document.  Please review at home and bring to your next prenatal visit. Bring this sheet to the hospital for your birth. Give copies to your care team members and support person.   Additional copies can be found here:  [email or web address]    Pregnancy: Your Third Trimester Changes  As the baby grows, your body changes, too. You may also see signs that your body is getting ready for labor. Be patient. Within a few more weeks, your baby will be born.   How you are changing  Your body is preparing for the birth of your baby. Some of the most common changes are listed below. If you  have any questions or concerns, ask your healthcare provider:     You ll gain more weight from fluids, extra blood, and fat deposits.    Your breasts will grow as your body gets ready to feed the baby. They may be more tender. You may also notice a slight yellow or white discharge from the nipples.    Discharge from your vagina may increase. This is normal.    You might see some skin color changes on your forehead, cheeks, or nose. Most of these will go away after you deliver.  How your baby is growing  Month 7  Your baby can open and close their eyes and weighs around 4 pounds (1.8 kg). If born prematurely (too early), your baby would likely survive with special care.     Month 8  Your baby is building up body fat and weighs around 6 pounds (2.7 kg).    Month 9  Your baby weighs nearly 7 pounds (3.2 kg) and is about 19 to 21 inches long. In other words, any day now...     [employer] last reviewed this educational content on 9/1/2021 2000-2022 [employer]. All rights reserved. This information is not intended as a substitute for professional medical care. Always follow your healthcare professional's instructions.

## 2023-05-04 LAB — T PALLIDUM AB SER QL: NONREACTIVE

## 2023-05-10 ENCOUNTER — OFFICE VISIT (OUTPATIENT)
Dept: MATERNAL FETAL MEDICINE | Facility: CLINIC | Age: 28
End: 2023-05-10
Attending: ADVANCED PRACTICE MIDWIFE
Payer: COMMERCIAL

## 2023-05-10 ENCOUNTER — HOSPITAL ENCOUNTER (OUTPATIENT)
Dept: ULTRASOUND IMAGING | Facility: CLINIC | Age: 28
Discharge: HOME | End: 2023-05-10
Attending: ADVANCED PRACTICE MIDWIFE | Admitting: OBSTETRICS & GYNECOLOGY
Payer: COMMERCIAL

## 2023-05-10 DIAGNOSIS — O43.113 CIRCUMVALLATE PLACENTA DURING PREGNANCY IN THIRD TRIMESTER, ANTEPARTUM: Primary | ICD-10-CM

## 2023-05-10 DIAGNOSIS — O26.90 PREGNANCY RELATED CONDITION, ANTEPARTUM: ICD-10-CM

## 2023-05-10 PROCEDURE — 76816 OB US FOLLOW-UP PER FETUS: CPT | Mod: 26 | Performed by: OBSTETRICS & GYNECOLOGY

## 2023-05-10 PROCEDURE — 76816 OB US FOLLOW-UP PER FETUS: CPT

## 2023-05-19 ENCOUNTER — PRENATAL OFFICE VISIT (OUTPATIENT)
Dept: MIDWIFE SERVICES | Facility: CLINIC | Age: 28
End: 2023-05-19
Payer: COMMERCIAL

## 2023-05-19 VITALS — SYSTOLIC BLOOD PRESSURE: 90 MMHG | BODY MASS INDEX: 36.11 KG/M2 | WEIGHT: 217 LBS | DIASTOLIC BLOOD PRESSURE: 60 MMHG

## 2023-05-19 DIAGNOSIS — O09.93 SUPERVISION OF HIGH RISK PREGNANCY IN THIRD TRIMESTER: Primary | ICD-10-CM

## 2023-05-19 DIAGNOSIS — M54.50 LOW BACK PAIN DURING PREGNANCY IN THIRD TRIMESTER: ICD-10-CM

## 2023-05-19 DIAGNOSIS — O26.893 LOW BACK PAIN DURING PREGNANCY IN THIRD TRIMESTER: ICD-10-CM

## 2023-05-19 PROCEDURE — 99207 PR PRENATAL VISIT: CPT | Performed by: ADVANCED PRACTICE MIDWIFE

## 2023-05-19 NOTE — PROGRESS NOTES
S: Feels nightly calf pain. Also having constant low back pain.  Baby active.  Denies uterine cramping, vaginal bleeding or leaking of fluid  O: Vitals: BP 90/60 (BP Location: Right arm, Patient Position: Chair, Cuff Size: Adult Large)   Wt 98.4 kg (217 lb)   LMP 10/12/2022   Breastfeeding No   BMI 36.11 kg/m    BMI= Body mass index is 36.11 kg/m .  Exam:  Constitutional: healthy, alert and no distress  Respiratory: respirations even and unlabored  Gastrointestinal: Abdomen soft, non-tender. Fundus measures appropriate for gestational age. Fetal heart tones hear without difficulty and within normal limits  : Deferred  Psychiatric: mentation appears normal and affect normal/bright  A:     ICD-10-CM    1. Supervision of high risk pregnancy in third trimester  O09.93       2. Low back pain during pregnancy in third trimester  O26.893 OB/Maternity Back Brace for DME - ONLY FOR DME    M54.50         P: Demonstrated how to stretch her calves. Also ordered a maternity belt to assist with back pain.  Discussed plans for labor. Discussed patient options for postpartum contraception. Patient is planning none  Encouraged patient to call with any questions or concerns.  Return to clinic 2 weeks    LAXMI Sanchez, HALINA

## 2023-06-19 ENCOUNTER — APPOINTMENT (OUTPATIENT)
Dept: INTERPRETER SERVICES | Facility: CLINIC | Age: 28
End: 2023-06-19
Payer: COMMERCIAL

## 2023-07-12 ENCOUNTER — OFFICE VISIT (OUTPATIENT)
Dept: MATERNAL FETAL MEDICINE | Facility: CLINIC | Age: 28
End: 2023-07-12
Attending: OBSTETRICS & GYNECOLOGY
Payer: COMMERCIAL

## 2023-07-12 ENCOUNTER — HOSPITAL ENCOUNTER (OUTPATIENT)
Dept: ULTRASOUND IMAGING | Facility: CLINIC | Age: 28
Discharge: HOME | End: 2023-07-12
Attending: OBSTETRICS & GYNECOLOGY
Payer: COMMERCIAL

## 2023-07-12 DIAGNOSIS — O43.113 CIRCUMVALLATE PLACENTA DURING PREGNANCY IN THIRD TRIMESTER, ANTEPARTUM: Primary | ICD-10-CM

## 2023-07-12 DIAGNOSIS — O43.113 CIRCUMVALLATE PLACENTA DURING PREGNANCY IN THIRD TRIMESTER, ANTEPARTUM: ICD-10-CM

## 2023-07-12 DIAGNOSIS — O99.213 OBESITY COMPLICATING PREGNANCY, THIRD TRIMESTER: ICD-10-CM

## 2023-07-12 PROCEDURE — 76816 OB US FOLLOW-UP PER FETUS: CPT

## 2023-07-12 PROCEDURE — 76816 OB US FOLLOW-UP PER FETUS: CPT | Mod: 26 | Performed by: OBSTETRICS & GYNECOLOGY

## 2023-07-12 NOTE — NURSING NOTE
Patient reports good fetal movement, denies pain, irregular contractions present. Denies leaking of fluid, or bleeding.  Patient denies headache, visual changes, nausea/vomiting, epigastric pain related to preeclampsia.  Education provided to patient on importance of regular prenatal care.  SBAR given to KRISH IYER, see their note in Epic.

## 2023-07-12 NOTE — PROGRESS NOTES
Please see the imaging tab for details of the ultrasound performed today.    Angie Tay MD  Specialist in Maternal-Fetal Medicine

## 2023-07-20 ENCOUNTER — TELEPHONE (OUTPATIENT)
Dept: MIDWIFE SERVICES | Facility: CLINIC | Age: 28
End: 2023-07-20

## 2023-07-20 NOTE — TELEPHONE ENCOUNTER
Update -     Patient was no call/no show for appointment.  Attempted to call patient with [place]  to check on patient status, patient did not answer.  Message left with the assistance of a Welsh .      Leela HANNA RN

## 2023-07-20 NOTE — TELEPHONE ENCOUNTER
"Patient calling with the assistance of an  -      40w1d    Patient feeling \"pain\" starting yesterday.  Patient reports pain worsening today and feeling a \"heaviness.\"    The pains are \"a bit\" a part, but the patient reports she is unable to walk through the pain.  Patient is concerned and would like to be evaluated.    Patient reports + fetal movement, no bleeding or leaking of fluid.      [facility] L&D is currently on divert.  When notified patient of current hospital status, patient asks if she can be evaluated in the clinic first instead.  Patient is willing to be evaluated at [facility] if the pain worsens.    Page to on call CNM.  Clementina Smith CNM returned call, willing to see patient at 1400 in the clinic this afternoon.  Patient called and notified.    Leela HANNA RN    "

## 2023-07-21 ENCOUNTER — PRENATAL OFFICE VISIT (OUTPATIENT)
Dept: MIDWIFE SERVICES | Facility: CLINIC | Age: 28
End: 2023-07-21
Payer: COMMERCIAL

## 2023-07-21 VITALS — BODY MASS INDEX: 36.11 KG/M2 | SYSTOLIC BLOOD PRESSURE: 122 MMHG | WEIGHT: 217 LBS | DIASTOLIC BLOOD PRESSURE: 60 MMHG

## 2023-07-21 DIAGNOSIS — O09.93 SUPERVISION OF HIGH RISK PREGNANCY IN THIRD TRIMESTER: ICD-10-CM

## 2023-07-21 DIAGNOSIS — D50.8 OTHER IRON DEFICIENCY ANEMIA: ICD-10-CM

## 2023-07-21 DIAGNOSIS — O99.013 ANEMIA DURING PREGNANCY IN THIRD TRIMESTER: ICD-10-CM

## 2023-07-21 DIAGNOSIS — Z36.85 ANTENATAL SCREENING FOR STREPTOCOCCUS B: Primary | ICD-10-CM

## 2023-07-21 DIAGNOSIS — O48.0 POST-TERM PREGNANCY, 40-42 WEEKS OF GESTATION: ICD-10-CM

## 2023-07-21 LAB
ERYTHROCYTE [DISTWIDTH] IN BLOOD BY AUTOMATED COUNT: 19.9 % (ref 10–15)
HCT VFR BLD AUTO: 31.4 % (ref 35–47)
HGB BLD-MCNC: 9.2 G/DL (ref 11.7–15.7)
MCH RBC QN AUTO: 20.9 PG (ref 26.5–33)
MCHC RBC AUTO-ENTMCNC: 29.3 G/DL (ref 31.5–36.5)
MCV RBC AUTO: 71 FL (ref 78–100)
PLATELET # BLD AUTO: 196 10E3/UL (ref 150–450)
RBC # BLD AUTO: 4.4 10E6/UL (ref 3.8–5.2)
WBC # BLD AUTO: 6.3 10E3/UL (ref 4–11)

## 2023-07-21 PROCEDURE — 85027 COMPLETE CBC AUTOMATED: CPT | Performed by: ADVANCED PRACTICE MIDWIFE

## 2023-07-21 PROCEDURE — 36415 COLL VENOUS BLD VENIPUNCTURE: CPT | Performed by: ADVANCED PRACTICE MIDWIFE

## 2023-07-21 PROCEDURE — 87653 STREP B DNA AMP PROBE: CPT | Performed by: ADVANCED PRACTICE MIDWIFE

## 2023-07-21 PROCEDURE — 99207 PR PRENATAL VISIT: CPT | Performed by: ADVANCED PRACTICE MIDWIFE

## 2023-07-21 RX ORDER — HEPARIN SODIUM,PORCINE 10 UNIT/ML
5-20 VIAL (ML) INTRAVENOUS DAILY PRN
Status: CANCELLED | OUTPATIENT
Start: 2023-07-21

## 2023-07-21 RX ORDER — ALBUTEROL SULFATE 0.83 MG/ML
2.5 SOLUTION RESPIRATORY (INHALATION)
Status: CANCELLED | OUTPATIENT
Start: 2023-07-21

## 2023-07-21 RX ORDER — ALBUTEROL SULFATE 90 UG/1
1-2 AEROSOL, METERED RESPIRATORY (INHALATION)
Status: CANCELLED
Start: 2023-07-21

## 2023-07-21 RX ORDER — EPINEPHRINE 1 MG/ML
0.3 INJECTION, SOLUTION, CONCENTRATE INTRAVENOUS EVERY 5 MIN PRN
Status: CANCELLED | OUTPATIENT
Start: 2023-07-21

## 2023-07-21 RX ORDER — HEPARIN SODIUM (PORCINE) LOCK FLUSH IV SOLN 100 UNIT/ML 100 UNIT/ML
5 SOLUTION INTRAVENOUS
Status: CANCELLED | OUTPATIENT
Start: 2023-07-21

## 2023-07-21 RX ORDER — METHYLPREDNISOLONE SODIUM SUCCINATE 125 MG/2ML
125 INJECTION, POWDER, LYOPHILIZED, FOR SOLUTION INTRAMUSCULAR; INTRAVENOUS
Status: CANCELLED
Start: 2023-07-21

## 2023-07-21 RX ORDER — DIPHENHYDRAMINE HYDROCHLORIDE 50 MG/ML
50 INJECTION INTRAMUSCULAR; INTRAVENOUS
Status: CANCELLED
Start: 2023-07-21

## 2023-07-21 NOTE — PROGRESS NOTES
DINAH to place IV Venofer infusion order. Order placed. Call to infusion center to attempt to get pt in asap, next available appt is not until mid-August, pt will be delivered by then.    ALLEN Recinos

## 2023-07-21 NOTE — PROGRESS NOTES
Feeling well.  Baby is active. Denies any leaking of fluid, vaginal bleeding, regular uterine contractions, or headaches or other concerns.  She has not had prenatal appt since 5/19.  She has been seeing MFM.  Last US was normal anatomy with 54% EFW on 7/12.    GBS and cervical exam today.    Hemoglobin was 8.7 on 5/3.  She was prescribed supplements and counseled on a high iron diet. She says that she has not been taking iron.  We discussed that dangers of severe anemia to mother and baby and for birth.  Hemoglobin ordered for today.  IV iron discussed and ordered to do ASAP.  RN assisting to schedule for ASAP.    We discussed going the hospital now for monitoring and/or possible induction.  She declines.    Recommended induction at 41 weeks.  She declines.  We discussed maternal and fetal risks.  She verbalizes understanding and agrees to BPP and midwife follow up appt at 41 week.  I walked her to the  to schedule these appts.  Urged her to come to the appts as scheduled.  She verbalized understanding and agreement.    Reviewed to call for contractions, loss of fluid, vaginal bleeding, decreased fetal movement or any other questions or concerns.    RTC Wednesday 7/26 or before .  Betsy Jaramillo, JOSE, APRN, CNM

## 2023-07-21 NOTE — TELEPHONE ENCOUNTER
"Patient returned call, still having \"pains.\"  Discussed missed appointment yesterday.    Patient asking for an appointment today, says she has a ride now.  Patient reports when she sits she is very uncomfortable as the baby's head is low.    Appointment made for 1040. Stressed importance of prompt arrival time and patient verbalized understanding.  MA notified.    Leela HANNA RN    "

## 2023-07-22 LAB — GP B STREP DNA SPEC QL NAA+PROBE: POSITIVE

## 2023-07-26 ENCOUNTER — ANCILLARY PROCEDURE (OUTPATIENT)
Dept: ULTRASOUND IMAGING | Facility: CLINIC | Age: 28
End: 2023-07-26
Attending: ADVANCED PRACTICE MIDWIFE
Payer: COMMERCIAL

## 2023-07-26 DIAGNOSIS — O48.0 POST-TERM PREGNANCY, 40-42 WEEKS OF GESTATION: ICD-10-CM

## 2023-07-26 PROCEDURE — 76819 FETAL BIOPHYS PROFIL W/O NST: CPT | Performed by: OBSTETRICS & GYNECOLOGY

## 2023-07-27 ENCOUNTER — PRENATAL OFFICE VISIT (OUTPATIENT)
Dept: MIDWIFE SERVICES | Facility: CLINIC | Age: 28
End: 2023-07-27
Attending: ADVANCED PRACTICE MIDWIFE
Payer: COMMERCIAL

## 2023-07-27 VITALS — WEIGHT: 215 LBS | BODY MASS INDEX: 35.78 KG/M2 | SYSTOLIC BLOOD PRESSURE: 116 MMHG | DIASTOLIC BLOOD PRESSURE: 64 MMHG

## 2023-07-27 DIAGNOSIS — O09.93 SUPERVISION OF HIGH RISK PREGNANCY IN THIRD TRIMESTER: Primary | ICD-10-CM

## 2023-07-27 PROCEDURE — 99207 PR PRENATAL VISIT: CPT | Performed by: ADVANCED PRACTICE MIDWIFE

## 2023-07-27 NOTE — PROGRESS NOTES
Patient is here today with an  via Ipad    S: Feels ready to have this baby. BPP  yesterday.  Baby active.  Denies uterine cramping, vaginal bleeding or leaking of fluid. No headache, increase in edema, no epigastric pain.   O: Vitals: /64   Wt 97.5 kg (215 lb)   LMP 10/12/2022   BMI 35.78 kg/m    BMI= Body mass index is 35.78 kg/m .  Exam:  Constitutional: healthy, alert and no distress  Respiratory: respirations even and unlabored  Gastrointestinal: Abdomen soft, non-tender. Fundus measures appropriate for gestational age. Fetal heart tones hear without difficulty and within normal limits  : Normal external genitalia without lesions  Psychiatric: mentation appears normal and affect normal/bright  A:     ICD-10-CM    1. Supervision of high risk pregnancy in third trimester  O09.93         P: Labor signs and symptoms discussed, aware of numbers to call.  Discussed warning signs of PIH/preeclampsia and patient will monitor.  GBS screen completed. Discussed plans for labor. Discussed patient options for postpartum contraception. Patient is planning none    Patient counseled on risks after 41 weeks of gestation.  The health risks for you and your fetus may increase if a pregnancy is late term or postterm, but problems occur in only a small number of postterm pregnancies. Most women who give birth after their due dates have uncomplicated labor and give birth to healthy babies. Risks associated with postterm pregnancy include the following:    -Stillbirth  -Macrosomia  -Postmaturity syndrome  -Meconium in the lungs of the fetus, which can cause serious breathing problems after birth  -Decreased amniotic fluid, which can cause the umbilical cord to pinch and restrict the flow of oxygen to the fetus  -Other risks include an increased chance of an assisted vaginal delivery or  delivery. There also is a higher chance of infection and postpartum hemorrhage when your pregnancy goes past your  due date.     Patient wishes to schedule induction tomorrow.  7. Scheduled pitocin induction for tomorrow at 0730. CNM on call advised of patient plans.      LAXMI Sanchez, REEM

## 2023-07-28 ENCOUNTER — HOSPITAL ENCOUNTER (INPATIENT)
Facility: CLINIC | Age: 28
LOS: 3 days | Discharge: HOME | End: 2023-07-31
Attending: ADVANCED PRACTICE MIDWIFE | Admitting: ADVANCED PRACTICE MIDWIFE
Payer: COMMERCIAL

## 2023-07-28 ENCOUNTER — APPOINTMENT (OUTPATIENT)
Dept: INTERPRETER SERVICES | Facility: CLINIC | Age: 28
End: 2023-07-28
Payer: COMMERCIAL

## 2023-07-28 DIAGNOSIS — R52 POSTPARTUM PAIN: Primary | ICD-10-CM

## 2023-07-28 LAB
ABO/RH(D): NORMAL
ANTIBODY SCREEN: NEGATIVE
HGB BLD-MCNC: 8.8 G/DL (ref 11.7–15.7)
SPECIMEN EXPIRATION DATE: NORMAL

## 2023-07-28 PROCEDURE — 120N000001 HC R&B MED SURG/OB

## 2023-07-28 PROCEDURE — 250N000011 HC RX IP 250 OP 636: Performed by: ADVANCED PRACTICE MIDWIFE

## 2023-07-28 PROCEDURE — 250N000009 HC RX 250: Performed by: ADVANCED PRACTICE MIDWIFE

## 2023-07-28 PROCEDURE — 3E033VJ INTRODUCTION OF OTHER HORMONE INTO PERIPHERAL VEIN, PERCUTANEOUS APPROACH: ICD-10-PCS | Performed by: ADVANCED PRACTICE MIDWIFE

## 2023-07-28 PROCEDURE — 86850 RBC ANTIBODY SCREEN: CPT | Performed by: ADVANCED PRACTICE MIDWIFE

## 2023-07-28 PROCEDURE — 86901 BLOOD TYPING SEROLOGIC RH(D): CPT | Performed by: ADVANCED PRACTICE MIDWIFE

## 2023-07-28 PROCEDURE — 85018 HEMOGLOBIN: CPT | Performed by: ADVANCED PRACTICE MIDWIFE

## 2023-07-28 RX ORDER — METOCLOPRAMIDE HYDROCHLORIDE 5 MG/ML
10 INJECTION INTRAMUSCULAR; INTRAVENOUS EVERY 6 HOURS PRN
Status: DISCONTINUED | OUTPATIENT
Start: 2023-07-28 | End: 2023-07-29 | Stop reason: HOSPADM

## 2023-07-28 RX ORDER — OXYTOCIN/0.9 % SODIUM CHLORIDE 30/500 ML
340 PLASTIC BAG, INJECTION (ML) INTRAVENOUS CONTINUOUS PRN
Status: DISCONTINUED | OUTPATIENT
Start: 2023-07-28 | End: 2023-07-29 | Stop reason: HOSPADM

## 2023-07-28 RX ORDER — PENICILLIN G 3000000 [IU]/50ML
3 INJECTION, SOLUTION INTRAVENOUS EVERY 4 HOURS
Status: DISCONTINUED | OUTPATIENT
Start: 2023-07-28 | End: 2023-07-29 | Stop reason: HOSPADM

## 2023-07-28 RX ORDER — NALOXONE HYDROCHLORIDE 0.4 MG/ML
0.4 INJECTION, SOLUTION INTRAMUSCULAR; INTRAVENOUS; SUBCUTANEOUS
Status: DISCONTINUED | OUTPATIENT
Start: 2023-07-28 | End: 2023-07-29 | Stop reason: HOSPADM

## 2023-07-28 RX ORDER — IBUPROFEN 800 MG/1
800 TABLET, FILM COATED ORAL
Status: DISCONTINUED | OUTPATIENT
Start: 2023-07-28 | End: 2023-07-29

## 2023-07-28 RX ORDER — OXYTOCIN 10 [USP'U]/ML
10 INJECTION, SOLUTION INTRAMUSCULAR; INTRAVENOUS
Status: DISCONTINUED | OUTPATIENT
Start: 2023-07-28 | End: 2023-07-29 | Stop reason: HOSPADM

## 2023-07-28 RX ORDER — CARBOPROST TROMETHAMINE 250 UG/ML
250 INJECTION, SOLUTION INTRAMUSCULAR
Status: DISCONTINUED | OUTPATIENT
Start: 2023-07-28 | End: 2023-07-29 | Stop reason: HOSPADM

## 2023-07-28 RX ORDER — OXYTOCIN/0.9 % SODIUM CHLORIDE 30/500 ML
1-24 PLASTIC BAG, INJECTION (ML) INTRAVENOUS CONTINUOUS
Status: DISCONTINUED | OUTPATIENT
Start: 2023-07-28 | End: 2023-07-29 | Stop reason: HOSPADM

## 2023-07-28 RX ORDER — NALOXONE HYDROCHLORIDE 0.4 MG/ML
0.2 INJECTION, SOLUTION INTRAMUSCULAR; INTRAVENOUS; SUBCUTANEOUS
Status: DISCONTINUED | OUTPATIENT
Start: 2023-07-28 | End: 2023-07-29 | Stop reason: HOSPADM

## 2023-07-28 RX ORDER — ONDANSETRON 4 MG/1
4 TABLET, ORALLY DISINTEGRATING ORAL EVERY 6 HOURS PRN
Status: DISCONTINUED | OUTPATIENT
Start: 2023-07-28 | End: 2023-07-29 | Stop reason: HOSPADM

## 2023-07-28 RX ORDER — TRANEXAMIC ACID 10 MG/ML
1 INJECTION, SOLUTION INTRAVENOUS EVERY 30 MIN PRN
Status: DISCONTINUED | OUTPATIENT
Start: 2023-07-28 | End: 2023-07-29 | Stop reason: HOSPADM

## 2023-07-28 RX ORDER — PROCHLORPERAZINE 25 MG
25 SUPPOSITORY, RECTAL RECTAL EVERY 12 HOURS PRN
Status: DISCONTINUED | OUTPATIENT
Start: 2023-07-28 | End: 2023-07-29 | Stop reason: HOSPADM

## 2023-07-28 RX ORDER — TERBUTALINE SULFATE 1 MG/ML
0.25 INJECTION, SOLUTION SUBCUTANEOUS
Status: DISCONTINUED | OUTPATIENT
Start: 2023-07-28 | End: 2023-07-29 | Stop reason: HOSPADM

## 2023-07-28 RX ORDER — FENTANYL CITRATE 50 UG/ML
100 INJECTION, SOLUTION INTRAMUSCULAR; INTRAVENOUS
Status: DISCONTINUED | OUTPATIENT
Start: 2023-07-28 | End: 2023-07-31 | Stop reason: HOSPADM

## 2023-07-28 RX ORDER — LIDOCAINE 40 MG/G
CREAM TOPICAL
Status: DISCONTINUED | OUTPATIENT
Start: 2023-07-28 | End: 2023-07-29 | Stop reason: HOSPADM

## 2023-07-28 RX ORDER — MISOPROSTOL 200 UG/1
800 TABLET ORAL
Status: DISCONTINUED | OUTPATIENT
Start: 2023-07-28 | End: 2023-07-29 | Stop reason: HOSPADM

## 2023-07-28 RX ORDER — PROCHLORPERAZINE MALEATE 10 MG
10 TABLET ORAL EVERY 6 HOURS PRN
Status: DISCONTINUED | OUTPATIENT
Start: 2023-07-28 | End: 2023-07-29 | Stop reason: HOSPADM

## 2023-07-28 RX ORDER — OXYTOCIN 10 [USP'U]/ML
10 INJECTION, SOLUTION INTRAMUSCULAR; INTRAVENOUS
Status: DISCONTINUED | OUTPATIENT
Start: 2023-07-28 | End: 2023-07-29

## 2023-07-28 RX ORDER — MISOPROSTOL 200 UG/1
400 TABLET ORAL
Status: DISCONTINUED | OUTPATIENT
Start: 2023-07-28 | End: 2023-07-29 | Stop reason: HOSPADM

## 2023-07-28 RX ORDER — CITRIC ACID/SODIUM CITRATE 334-500MG
30 SOLUTION, ORAL ORAL
Status: DISCONTINUED | OUTPATIENT
Start: 2023-07-28 | End: 2023-07-29 | Stop reason: HOSPADM

## 2023-07-28 RX ORDER — KETOROLAC TROMETHAMINE 30 MG/ML
30 INJECTION, SOLUTION INTRAMUSCULAR; INTRAVENOUS
Status: DISCONTINUED | OUTPATIENT
Start: 2023-07-28 | End: 2023-07-29

## 2023-07-28 RX ORDER — METHYLERGONOVINE MALEATE 0.2 MG/ML
200 INJECTION INTRAVENOUS
Status: DISCONTINUED | OUTPATIENT
Start: 2023-07-28 | End: 2023-07-29 | Stop reason: HOSPADM

## 2023-07-28 RX ORDER — OXYTOCIN/0.9 % SODIUM CHLORIDE 30/500 ML
100-340 PLASTIC BAG, INJECTION (ML) INTRAVENOUS CONTINUOUS PRN
Status: DISCONTINUED | OUTPATIENT
Start: 2023-07-28 | End: 2023-07-29

## 2023-07-28 RX ORDER — KETOROLAC TROMETHAMINE 30 MG/ML
30 INJECTION, SOLUTION INTRAMUSCULAR; INTRAVENOUS
Status: DISCONTINUED | OUTPATIENT
Start: 2023-07-28 | End: 2023-07-30

## 2023-07-28 RX ORDER — SODIUM CHLORIDE, SODIUM LACTATE, POTASSIUM CHLORIDE, CALCIUM CHLORIDE 600; 310; 30; 20 MG/100ML; MG/100ML; MG/100ML; MG/100ML
INJECTION, SOLUTION INTRAVENOUS CONTINUOUS PRN
Status: DISCONTINUED | OUTPATIENT
Start: 2023-07-28 | End: 2023-07-29 | Stop reason: HOSPADM

## 2023-07-28 RX ORDER — PENICILLIN G POTASSIUM 5000000 [IU]/1
5 INJECTION, POWDER, FOR SOLUTION INTRAMUSCULAR; INTRAVENOUS ONCE
Status: COMPLETED | OUTPATIENT
Start: 2023-07-28 | End: 2023-07-28

## 2023-07-28 RX ORDER — ONDANSETRON 2 MG/ML
4 INJECTION INTRAMUSCULAR; INTRAVENOUS EVERY 6 HOURS PRN
Status: DISCONTINUED | OUTPATIENT
Start: 2023-07-28 | End: 2023-07-29 | Stop reason: HOSPADM

## 2023-07-28 RX ORDER — METOCLOPRAMIDE 10 MG/1
10 TABLET ORAL EVERY 6 HOURS PRN
Status: DISCONTINUED | OUTPATIENT
Start: 2023-07-28 | End: 2023-07-29 | Stop reason: HOSPADM

## 2023-07-28 RX ADMIN — PENICILLIN G 3 MILLION UNITS: 3000000 INJECTION, SOLUTION INTRAVENOUS at 23:31

## 2023-07-28 RX ADMIN — FENTANYL CITRATE 100 MCG: 50 INJECTION, SOLUTION INTRAMUSCULAR; INTRAVENOUS at 21:27

## 2023-07-28 RX ADMIN — FENTANYL CITRATE 100 MCG: 50 INJECTION, SOLUTION INTRAMUSCULAR; INTRAVENOUS at 22:49

## 2023-07-28 RX ADMIN — PENICILLIN G 3 MILLION UNITS: 3000000 INJECTION, SOLUTION INTRAVENOUS at 19:13

## 2023-07-28 RX ADMIN — PENICILLIN G POTASSIUM 5 MILLION UNITS: 5000000 POWDER, FOR SOLUTION INTRAMUSCULAR; INTRAPLEURAL; INTRATHECAL; INTRAVENOUS at 10:32

## 2023-07-28 RX ADMIN — PENICILLIN G 3 MILLION UNITS: 3000000 INJECTION, SOLUTION INTRAVENOUS at 15:02

## 2023-07-28 RX ADMIN — Medication 2 MILLI-UNITS/MIN: at 12:41

## 2023-07-28 RX ADMIN — FENTANYL CITRATE 100 MCG: 50 INJECTION, SOLUTION INTRAMUSCULAR; INTRAVENOUS at 23:54

## 2023-07-28 ASSESSMENT — ACTIVITIES OF DAILY LIVING (ADL)
DRESSING/BATHING_DIFFICULTY: NO
WALKING_OR_CLIMBING_STAIRS_DIFFICULTY: NO
WEAR_GLASSES_OR_BLIND: NO
ADLS_ACUITY_SCORE: 35
TOILETING_ISSUES: NO
ADLS_ACUITY_SCORE: 18
CHANGE_IN_FUNCTIONAL_STATUS_SINCE_ONSET_OF_CURRENT_ILLNESS/INJURY: NO
ADLS_ACUITY_SCORE: 18
DIFFICULTY_EATING/SWALLOWING: NO
ADLS_ACUITY_SCORE: 18
DOING_ERRANDS_INDEPENDENTLY_DIFFICULTY: NO
FALL_HISTORY_WITHIN_LAST_SIX_MONTHS: NO
ADLS_ACUITY_SCORE: 18
CONCENTRATING,_REMEMBERING_OR_MAKING_DECISIONS_DIFFICULTY: NO

## 2023-07-28 NOTE — PLAN OF CARE
Data: [facility] presented to [facility]: 2023  8:15 AM.  Patient admitted for induction for postdates. Patient is a .  Prenatal record reviewed. Pregnancy has been uncomplicated.  Gestational Age 41w2d. VSS. Fetal movement active. Patient denies leaking of vaginal fluid/rupture of membranes, vaginal bleeding, abdominal pain, pelvic pressure, nausea, vomiting, headache, visual disturbances, epigastric or URQ pain, significant edema. Support person is not present.   Action: Verbal consent for EFM.  Admission assessment completed. Bill of rights reviewed.  Response: Patient verbalized agreement with plan. Will contact HALINA Fung with update and further orders.

## 2023-07-28 NOTE — PROGRESS NOTES
HALINA PROGRESS NOTE    SUBJECTIVE:  Karen presents today for an induction of labor  with pitocin However she would like to talk more about the induction because she was under the impression that since her cervix is dilated then she was going to progress spontaneously without intervention. She is resting in bed after lunch.     OBJECTIVE:  /59 (BP Location: Right arm, Patient Position: Semi-Parker's, Cuff Size: Adult Large)   Temp 98.2  F (36.8  C) (Oral)   Wt 97.5 kg (215 lb)   LMP 10/12/2022   BMI 35.78 kg/m      Fetal heart tones: Baseline 130   Variability: moderate  Accelerations: present  Decelerations: absent    Contractions: Pt is tia every 2- 7 minutes, lasting  seconds     Cervix: Deferred  ROM: not ruptured    Pitocin- none  Antibiotics- PCN x1  Cervical ripening: N/A    ASSESSMENT:  IUP @ 41w2d IOL for postdates   GBS- positive  Category I tracing      PLAN:   Discussed induction with pitocin and  patient allowed to ask questions and she verbally consented to start pitocin. She requested IV fentanyl when pitocin is started, will administer  per patient's request  Comfort measures prn   Pain medication-Iv fentanyl ordered prn for  pain  Anticipate   Labor induction with Pitocin  Reevaluate in 2-4 hrs /PRN    Visit conducted with professional Venezuelan interpretor via ipad.     Polly JOHNSON on 2023 at 12:44 PM    LAXMI Tamez, HALINA    I was present with the student who participated in the service and documentation of the services provided. I have verified the history and personally performed the physical exam and medical decision making as documented by the student and edited by me.

## 2023-07-28 NOTE — PROVIDER NOTIFICATION
07/28/23 1212   Provider Notification   Provider Name/Title HALINA Fung   Method of Notification At Bedside  (discuss poc, pt needed some clarification. HALINA Sahu confirmed presentation head down via US. Will begin pitocin now, pt plans on using IV pain medicaiton only. Pt tx for gbs+ x1.)

## 2023-07-28 NOTE — PROVIDER NOTIFICATION
07/28/23 1701   Provider Notification   Provider Name/Title HALINA Fung   Method of Notification At Bedside     Updated CNM and haroldo Matias that pitocin is running at 10 mU/hr. Pt is mildly uncomfortable with contractions, tia q1.5-5 min. Palpating moderately. Pt declining pain medication at this time. REEM and haroldo Matias reviewed FHT. Pt declined cervical check at this time.    Plan to reassess cervix in ~1 hr.

## 2023-07-28 NOTE — PROVIDER NOTIFICATION
07/28/23 1800   Provider Notification   Provider Name/Title CNM Adelina Fung   Method of Notification At Bedside     CNM at bedside for cervical exam. SVE - 2/70-80/-2. Updated that pitocin running at 10 mU, pt mildly uncomfortable and declining pain medication, category 1 tracing, contractions q2-2.5 sec.  at bedside interpreting, declined use of iPad .    No new orders at this time.

## 2023-07-28 NOTE — PROVIDER NOTIFICATION
07/28/23 0940   Provider Notification   Provider Name/Title HALINA Durant   Method of Notification Electronic Page   Request Evaluate in Person   Notification Reason Patient Arrived     CNM notified of patient arrival and request from pt to check her cervix.     UPDATE @ 5676 CNM at bedside to perform SVE (2.3/50/-3). Orders received to start IV and administer PCN for GBS + status. CNM to put in orders.

## 2023-07-28 NOTE — PROGRESS NOTES
HALINA PROGRESS NOTE    SUBJECTIVE:  Karen is coping with labor well. She has no concerns    OBJECTIVE:  /64 (BP Location: Left arm, Patient Position: Semi-Parker's, Cuff Size: Adult Large)   Temp 98  F (36.7  C) (Oral)   Resp 18   Wt 97.5 kg (215 lb)   LMP 10/12/2022   BMI 35.78 kg/m      Fetal heart tones: Baseline 125  Variability: moderate  Accelerations: present  Decelerations: absent    Contractions: Pt is tia every 1-2 minutes, lasting 50-80 seconds     Cervix: 2/ 75%/ -2, soft, anterior, tillman 10, vtx   ROM: not ruptured    Pitocin- 10 mu/min.  Antibiotics- PCN  Cervical ripening: N/A    ASSESSMENT:  IUP @ 41w2d IOL for postdates   GBS- positive  Category I tracing     Labor Events  23: 0950 Cervix 2.5/50/-3  1241: Started pitocin  1815: Pitocin at 10 units, SVE 2/75/-2     PLAN:   Pain medication as needed  Prophylactic antibiotic for + GBS status  Anticipate   Labor induction with Pitocin   Reevaluate in 2-4 hours/PRN    Polly JOHNSON  on 2023 at 6:31 PM    LAXMI Tamez, HALINA    I was present with the student who participated in the service and documentation of the services provided. I have verified the history and personally performed the physical exam and medical decision making as documented by the student and edited by me.

## 2023-07-28 NOTE — H&P
HALINA Labor Admission History & Physical    Karen Loya is a 28 year old  with an IUP at 41w2d  Somalian; ,   Partner/support Person: Stephen  Language Barrier: English, Micronesian  Clinic: [facility]  Provider: LAXMI Tamez CNM      Karen Loya is admitted to the Birthplace at [facility] on 2023 at 10:15 AM       History of present inllness/Chief Complaint:    Jeri presents for induction of labor secondary to postdates. Her pregnancy has been complicated by BMI 34, history of preeclampsia and fetal growth restriction in prior pregnancy, and current circumvallate placenta. She presents to  tia irregularly.     Here with: induction of labor secondary to postdates  Patient reports contractions are Irregular     Baby active Yes  Membranes are intact.  Bloody show No   Any changes with medical history since last prenatal visit No  Declines syphilis screening.  Nonreactive x2 in pregnancy     Obstetrical history  Estimated Date of Delivery: 2023 determined by LMP and confirmed with 13w6d ega ultrasound.   Patient's last menstrual period was 10/12/2022.   Dating U/S: 2023    Fetal anatomic survey: Normal, except for partial circumvallate placenta is noted   Placenta: anterior     PRENATAL COURSE  Prenatal care began at 15 wks gestation for a total of 7 prenatal visits.  Total wt gain 6lb; Body mass index is 35.78 kg/m .  Prenatal Blood Pressure: WNL  Prenatal course was complicated by    Patient Active Problem List    Diagnosis Date Noted    Labor and delivery, indication for care 2023     Priority: Medium    Circumvallate placenta 2023     Priority: Medium    Anemia, iron deficiency 2022     Priority: Medium    Postpartum pain 2022     Priority: Medium    Post-dates pregnancy 2022     Priority: Medium    Poor fetal growth affecting management of mother in third trimester, single or unspecified fetus 2022     Priority: Medium      EFW 29% w/ AC 6% @ 37w.   F/U US 2022 :      Anemia of mother in pregnancy, antepartum, third trimester 2022     Priority: Medium    Vitamin D deficiency 2021     Priority: Medium     Vit D at IOB 9  Advised 1,000 international unit(s) Vit D3 daily      Obesity affecting pregnancy in third trimester 12/15/2021     Priority: Medium     BMI 38 pre preg  Ref to MFM for level II      History of third degree perineal laceration 12/15/2021     Priority: Medium     x2  Provide counseling and offer MD referral [ ]    discussed - declines MD consult/referral       Supervision of high risk pregnancy in third trimester 2021     Priority: Medium       Clinic/Hospital: [facility]  Partner Name: Stephen  Ultrasound predicts sex: boy  Childrens names/ages: 20 month old boy  Previous labor experiences: IOL for preeclampsia,  of 7lb8oz boy   Waterbirth (interest, declined, ineligible): n/a  Level of education/occupation:   Pertinent History: Obesity, belkis. Macrosomia. Previous infant was suspected to be LGA and was AGA   PP contraception: Undecided   Hx PPD/Depression/Anxiety: Denies   Peds provider:    Plans for labor pain management: Epidural   Plans for post partum recovery/time off:  Feeding preference: breast   Breast pump:   Car seat:  Circumcision:  Discussed 2 week visit:  Tdap:   Flu:        History of pre-eclampsia in prior pregnancy, currently pregnant 2020     Priority: Medium     Baby ASA during pregnancy       Tdap: declined  Rhogam: O pos    Patient Active Problem List   Diagnosis    History of pre-eclampsia in prior pregnancy, currently pregnant    Supervision of high risk pregnancy in third trimester    Obesity affecting pregnancy in third trimester    History of third degree perineal laceration    Vitamin D deficiency    Anemia of mother in pregnancy, antepartum, third trimester    Poor fetal growth affecting management of mother in third trimester, single or unspecified fetus     Post-dates pregnancy    Postpartum pain    Anemia, iron deficiency    Circumvallate placenta    Labor and delivery, indication for care       HISTORY  No Known Allergies  Past Medical History:   Diagnosis Date    Nausea/vomiting in pregnancy 2020    No pertinent past medical history 2020    Post term pregnancy, antepartum 2021    Post-dates pregnancy 2021    Supervision of high risk pregnancy in third trimester 3/17/2021     Clinic/Hospital: [facility] Partner Name: Stephen Ultrasound predicts sex: boy Childrens names/ages: 20 month old boy Previous labor experiences: IOL for preeclampsia,  of 7lb8oz boy  Waterbirth (interest, declined, ineligible): n/a Level of education/occupation:  Pertinent History: Obesity, belkis. Macrosomia. Previous infant was suspected to be LGA and was AGA  PP contraception: Undecided  Hx PPD/Dep     Past Surgical History:   Procedure Laterality Date    NO HISTORY OF SURGERY  2020     History reviewed. No pertinent family history.  Social History     Tobacco Use    Smoking status: Never    Smokeless tobacco: Never   Substance Use Topics    Alcohol use: Never     OB History    Para Term  AB Living   4 3 3 0 0 3   SAB IAB Ectopic Multiple Live Births   0 0 0 0 3      # Outcome Date GA Lbr Robert/2nd Weight Sex Delivery Anes PTL Lv   4 Current            3 Term 22 42w0d 05:00 / 00:13 3.68 kg (8 lb 1.8 oz) F Vag-Spont Local, IV N MARLA      Name: RGFEMALE-FRANKIE      Apgar1: 9  Apgar5: 9   2 Term 04/15/21 41w2d 05:17 / 02:04 3.82 kg (8 lb 6.8 oz) M Vag-Spont EPI N MARLA      Name: RGMALE-FRANKIE      Apgar1: 8  Apgar5: 9   1 Term 19 40w0d  3.175 kg (7 lb) M Vag-Spont   MARLA      Birth Comments: partial 3rd degree lac, IOL for GHTN       LABS:  Lab Results   Component Value Date    ABO O 2021    ABO O 2021    RH Pos 2021    RH Pos 2021    AS Negative 2023    HGB 9.2 (L) 2023    HEPBANG Nonreactive 2023     CHPCRT Negative 2023    GCPCRT Negative 2023       GBS Status:   Lab Results   Component Value Date    GBS Positive (A) 2021     Rubella: Immune    HIV: Non-Reactive   Platelets:  196  1hr GCT:  126    ROS   Pt is alert and oriented  Pt denies significant constitutional symptoms including fever and/or malaise.    Pt denies significant respiratory, cardiovacular, GI, or muscular/skeletal complaints.    Neuro: Denies HA and visual changes  Muscoloskeletal: Denies except for discomforts r/t pregnancy     PHYSICAL EXAM:  /59 (BP Location: Right arm, Patient Position: Semi-Parker's, Cuff Size: Adult Large)   Temp 98.2  F (36.8  C) (Oral)   Wt 97.5 kg (215 lb)   LMP 10/12/2022   BMI 35.78 kg/m    General appearance:  healthy, alert, and active   Heart: RRR  Lungs: CTA bilaterally, normal respiratory effort  Abdomen: gravid, single vertex fetus, non-tender, EFW AGA   Legs: reflexes 2+ bilaterally, no clonus, no edema     Contractions: Pt is tia in an irregular pattern    Fetal heart tones: Baseline 135   Variability: moderate   Accelerations: present  Decelerations: absent    NST: reactive    Cervix: 2.5cm / 50% / -3, unchaged from clinic visit yesterday at which point tillman of 7, Vtx  Bloody show: no  Membranes:  intact     ASSESSMENT:  28 year old  with santos IUP 41w2d for induction of labor.  Indication postdates.   Tia irregularly   NST reactive  GBS positive and membranes intact  Anemia of pregnancy - hgb 8.8 on admission. Taking PO iron. Was advised to do iron infusions in pregnancy but did not follow up.     PLAN:  Routine CNM care  Begin GBS prophylaxis as pt is tia irregularly and uncomfortable   Labs ordered: Hemoglobin and type and screen  Teaching done r/t comfort measures, pain management options, and labor processes.   Admit - see IP orders  Labor induction with Pitocin   Anticipate         Adelina Fung CNM

## 2023-07-28 NOTE — PROGRESS NOTES
HALINA PROGRESS NOTE    SUBJECTIVE:  patient standing and rocking from side to side. She reports coping well with labor and has not needed any medications to help. She also got her second dose of PCN.     OBJECTIVE:  /59 (BP Location: Left arm, Patient Position: Semi-Parker's, Cuff Size: Adult Large)   Temp 98.6  F (37  C) (Oral)   Resp 20   Wt 97.5 kg (215 lb)   LMP 10/12/2022   BMI 35.78 kg/m      Fetal heart tones: Baseline 135   Variability: moderate  Accelerations: present  Decelerations: present    Contractions: Pt is tia every 2-3 minutes, lasting 50-70 seconds and palpates moderate    Cervix: deferred  ROM: not ruptured    Pitocin- 10 mu/min.  Antibiotics- PCN  Cervical ripening: N/A    ASSESSMENT:  IUP @ 41w2d IOL for postdates   GBS- positive  Category I tracing     Labor Events  23: 0950 Cervix 2.5/50/-3  1241: Started pitocin    PLAN:     Pain medication -as needed for labor coping   Prophylactic antibiotic for + GBS status  Anticipate   Labor induction with Pitocin  Reevaluate in 2-4 hours/PRN    Polly JOHNSON on 2023 at 5:16 PM    LAXMI Tamez, HALINA    I was present with the student who participated in the service and documentation of the services provided. I have verified the history and personally performed the physical exam and medical decision making as documented by the student and edited by me.

## 2023-07-29 LAB — HGB BLD-MCNC: 8.9 G/DL (ref 11.7–15.7)

## 2023-07-29 PROCEDURE — 85018 HEMOGLOBIN: CPT | Performed by: ADVANCED PRACTICE MIDWIFE

## 2023-07-29 PROCEDURE — 120N000001 HC R&B MED SURG/OB

## 2023-07-29 PROCEDURE — 722N000001 HC LABOR CARE VAGINAL DELIVERY SINGLE

## 2023-07-29 PROCEDURE — 250N000011 HC RX IP 250 OP 636: Performed by: ADVANCED PRACTICE MIDWIFE

## 2023-07-29 PROCEDURE — 250N000013 HC RX MED GY IP 250 OP 250 PS 637: Performed by: ADVANCED PRACTICE MIDWIFE

## 2023-07-29 PROCEDURE — 59400 OBSTETRICAL CARE: CPT | Performed by: ADVANCED PRACTICE MIDWIFE

## 2023-07-29 PROCEDURE — 36415 COLL VENOUS BLD VENIPUNCTURE: CPT | Performed by: ADVANCED PRACTICE MIDWIFE

## 2023-07-29 PROCEDURE — 0HQ9XZZ REPAIR PERINEUM SKIN, EXTERNAL APPROACH: ICD-10-PCS | Performed by: ADVANCED PRACTICE MIDWIFE

## 2023-07-29 PROCEDURE — 250N000009 HC RX 250: Performed by: ADVANCED PRACTICE MIDWIFE

## 2023-07-29 RX ORDER — BISACODYL 10 MG
10 SUPPOSITORY, RECTAL RECTAL DAILY PRN
Status: DISCONTINUED | OUTPATIENT
Start: 2023-07-29 | End: 2023-07-31 | Stop reason: HOSPADM

## 2023-07-29 RX ORDER — ACETAMINOPHEN 325 MG/1
650 TABLET ORAL EVERY 4 HOURS PRN
Status: DISCONTINUED | OUTPATIENT
Start: 2023-07-29 | End: 2023-07-31 | Stop reason: HOSPADM

## 2023-07-29 RX ORDER — MODIFIED LANOLIN
OINTMENT (GRAM) TOPICAL
Status: DISCONTINUED | OUTPATIENT
Start: 2023-07-29 | End: 2023-07-31 | Stop reason: HOSPADM

## 2023-07-29 RX ORDER — MISOPROSTOL 200 UG/1
800 TABLET ORAL
Status: DISCONTINUED | OUTPATIENT
Start: 2023-07-29 | End: 2023-07-31 | Stop reason: HOSPADM

## 2023-07-29 RX ORDER — HYDROCORTISONE 25 MG/G
CREAM TOPICAL 3 TIMES DAILY PRN
Status: DISCONTINUED | OUTPATIENT
Start: 2023-07-29 | End: 2023-07-31 | Stop reason: HOSPADM

## 2023-07-29 RX ORDER — OXYTOCIN/0.9 % SODIUM CHLORIDE 30/500 ML
340 PLASTIC BAG, INJECTION (ML) INTRAVENOUS CONTINUOUS PRN
Status: DISCONTINUED | OUTPATIENT
Start: 2023-07-29 | End: 2023-07-31 | Stop reason: HOSPADM

## 2023-07-29 RX ORDER — TRANEXAMIC ACID 10 MG/ML
1 INJECTION, SOLUTION INTRAVENOUS EVERY 30 MIN PRN
Status: DISCONTINUED | OUTPATIENT
Start: 2023-07-29 | End: 2023-07-31 | Stop reason: HOSPADM

## 2023-07-29 RX ORDER — METHYLERGONOVINE MALEATE 0.2 MG/ML
200 INJECTION INTRAVENOUS
Status: DISCONTINUED | OUTPATIENT
Start: 2023-07-29 | End: 2023-07-31 | Stop reason: HOSPADM

## 2023-07-29 RX ORDER — NALOXONE HYDROCHLORIDE 0.4 MG/ML
0.2 INJECTION, SOLUTION INTRAMUSCULAR; INTRAVENOUS; SUBCUTANEOUS
Status: DISCONTINUED | OUTPATIENT
Start: 2023-07-29 | End: 2023-07-31 | Stop reason: HOSPADM

## 2023-07-29 RX ORDER — MISOPROSTOL 200 UG/1
400 TABLET ORAL
Status: DISCONTINUED | OUTPATIENT
Start: 2023-07-29 | End: 2023-07-31 | Stop reason: HOSPADM

## 2023-07-29 RX ORDER — NALOXONE HYDROCHLORIDE 0.4 MG/ML
0.4 INJECTION, SOLUTION INTRAMUSCULAR; INTRAVENOUS; SUBCUTANEOUS
Status: DISCONTINUED | OUTPATIENT
Start: 2023-07-29 | End: 2023-07-31 | Stop reason: HOSPADM

## 2023-07-29 RX ORDER — IBUPROFEN 800 MG/1
800 TABLET, FILM COATED ORAL EVERY 6 HOURS PRN
Status: DISCONTINUED | OUTPATIENT
Start: 2023-07-29 | End: 2023-07-31 | Stop reason: HOSPADM

## 2023-07-29 RX ORDER — DOCUSATE SODIUM 100 MG/1
100 CAPSULE, LIQUID FILLED ORAL DAILY
Status: DISCONTINUED | OUTPATIENT
Start: 2023-07-29 | End: 2023-07-31 | Stop reason: HOSPADM

## 2023-07-29 RX ORDER — OXYTOCIN 10 [USP'U]/ML
10 INJECTION, SOLUTION INTRAMUSCULAR; INTRAVENOUS
Status: DISCONTINUED | OUTPATIENT
Start: 2023-07-29 | End: 2023-07-31 | Stop reason: HOSPADM

## 2023-07-29 RX ORDER — CARBOPROST TROMETHAMINE 250 UG/ML
250 INJECTION, SOLUTION INTRAMUSCULAR
Status: DISCONTINUED | OUTPATIENT
Start: 2023-07-29 | End: 2023-07-31 | Stop reason: HOSPADM

## 2023-07-29 RX ADMIN — METHYLERGONOVINE MALEATE 200 MCG: 0.2 INJECTION, SOLUTION INTRAMUSCULAR; INTRAVENOUS at 02:29

## 2023-07-29 RX ADMIN — FENTANYL CITRATE 100 MCG: 50 INJECTION, SOLUTION INTRAMUSCULAR; INTRAVENOUS at 01:15

## 2023-07-29 RX ADMIN — KETOROLAC TROMETHAMINE 30 MG: 30 INJECTION, SOLUTION INTRAMUSCULAR; INTRAVENOUS at 03:24

## 2023-07-29 RX ADMIN — LIDOCAINE HYDROCHLORIDE 20 ML: 10 INJECTION, SOLUTION EPIDURAL; INFILTRATION; INTRACAUDAL; PERINEURAL at 02:40

## 2023-07-29 RX ADMIN — FENTANYL CITRATE 100 MCG: 50 INJECTION, SOLUTION INTRAMUSCULAR; INTRAVENOUS at 02:08

## 2023-07-29 ASSESSMENT — ACTIVITIES OF DAILY LIVING (ADL)
ADLS_ACUITY_SCORE: 21
ADLS_ACUITY_SCORE: 21
ADLS_ACUITY_SCORE: 18
ADLS_ACUITY_SCORE: 18
ADLS_ACUITY_SCORE: 21
ADLS_ACUITY_SCORE: 18
ADLS_ACUITY_SCORE: 21

## 2023-07-29 NOTE — PROGRESS NOTES
HALINA PROGRESS NOTE    SUBJECTIVE:  Paged to bedside by RN. Karen is having to breathe through contractions, and is requesting a cervical exam. Her  Stephen is back at the bedside. She has been using fentanyl for pain relief. SROM of clear fluid noted shortly after provider left room following cervical exam.     OBJECTIVE:  /57 (BP Location: Left arm, Patient Position: Semi-Parker's, Cuff Size: Adult Large)   Temp 97.8  F (36.6  C) (Oral)   Resp 18   Wt 97.5 kg (215 lb)   LMP 10/12/2022   BMI 35.78 kg/m      Fetal heart tones: Baseline 120   Variability: moderate  Accelerations: present  Decelerations: absent    Contractions: Pt is tia every 2-3 minutes, lasting 90 seconds and palpates strong    Cervix: 6/ 90% / -2, Vtx  ROM: SROM, clear fluid    Pitocin- 14 mu/min.  Antibiotics- PCN q4hrs, adequate GBS prophylaxis obtained    Cervical ripening: N/A    Labor Events  23: 0950 Cervix 2.5/50/-3  1241: Started pitocin  1815: Pitocin at 10 units, SVE 2/75/-2  2130: Pitocin at 14 units, SVE 3/75/-2  0030: Pitocin at 14 units, SVE 6/90/-2, SROM    ASSESSMENT:  IUP @ 41w3d IOL for postdates   GBS- positive  Cat I FHTs  SROM     PLAN:     Prophylactic antibiotic for + GBS status  Anticipate   Labor induction with Pitocin  reevaluate in 2-4 hours/PRN    Adelina Fung CNM

## 2023-07-29 NOTE — PROVIDER NOTIFICATION
07/28/23 2110   Provider Notification   Provider Name/Title Adelina Moon   Method of Notification At Bedside     CNM at bedside for assessment of labor progress. Karen is at the side of the bed swaying with contractions, breathing and wincing through. During a couple contractions at bedside, some drops of clear fluid noted to drip onto floor with contraction. Noted time as potential SROM, but it is a very small amount of fluid and will continue to observe. While swaying, contraction pattern looks like uterine tachystystole, but as Karen is moving and swaying it moves the TOCO and contraction appears longer than actually observed; will continue to monitor for uterine tachysystole; she has emptied her bladder within the last hour. Discussed plan of care with Karen; she declined an  for this conversation. She consented to an SVE; 3/75/-2, anterior. Difficult for CNM to assess membrane status, still feels intact. Karen asking about pain management and IV fentanyl discussed and given.

## 2023-07-29 NOTE — PROVIDER NOTIFICATION
23 0206   Provider Notification   Provider Name/Title Adelina Fung CNM   Method of Notification At Bedside     CNM and student midwife at bedside per patient request and reporting to push. Not coping well. Asking for IV fentanyl which cannot yet be given. Patient refusing all monitoring. Pitocin stopped due to lack of monitoring per CNM. Doptones attempted, patient refused. SVE complete. NICU paged to bedside due to recent fentanyl administration. CNM and SCNM remained at bedside for delivery.  at 0230.

## 2023-07-29 NOTE — PROGRESS NOTES
Vaginal Delivery Postpartum Day of Delivery    Patient Name:  Karen Loya  :      1995  MRN:      [id number]      Subjective:  Karen Loya is feeling well and happy with birth experience.  Sitting up in bed eating pizza for breakfast. Denies any concerns this morning. Reports no sleep since delivery.  States she is breastfeeding and also supplementing with formula. Reports voiding without difficulty and vaginal bleeding has decreased.  Denies passing any large clots.  Pain is well controlled with current medications.     Objective:  /75   Pulse 68   Temp 98.5  F (36.9  C) (Oral)   Resp 18   Wt 97.5 kg (215 lb)   LMP 10/12/2022   Breastfeeding Unknown   BMI 35.78 kg/m      No exam    There is no immunization history for the selected administration types on file for this patient.    Assessment:    -Stable Postpartum Day of Delivery  -S/P 6 hours   -Breastfeeding and supplementing with formula       Plan:   -Continue routine care  -Plan for today: rest, breastfeeding on demand and limiting visitors          Provider: LAXMI Corona, DIEGO MEADE     Date:  2023  Time:  8:50 AM    Patient Name:  Karen Loya  :  1995  MRN:  [id number]

## 2023-07-29 NOTE — PROVIDER NOTIFICATION
07/29/23 0025   Provider Notification   Provider Name/Title Adelina Fung CNSHAYLA   Method of Notification In Department     CNM requested at bedside to evaluate in person. Transition symptoms, shaking,  and nausea were observed, patient stated she needed to push. SVE 6cm with a bulging bag. Patient requested to use the bathroom afterwards; SROM while standing up after using the bathroom.

## 2023-07-29 NOTE — PLAN OF CARE
Karen is very uncomfortable with fetal monitoring straps, TOCO/US. Writer is frequently adjusting TOCO when in room; when TOCO is placed properly, contractions show every 2-3 minutes. However, Karen frequently pulls TOCO off of uterine fundus and displaces off her abdomen, resulting in lack of activity on monitor/TOCO. She is observed to be breathing through contractions every 2-3 minutes; palpation shows strong contractions every 2-3 minutes. Will continue to adjust TOCO and assess contraction process.

## 2023-07-29 NOTE — PROVIDER NOTIFICATION
07/29/23 0121   Provider Notification   Provider Name/Title Adelina KEENANM/Rosemary   Method of Notification At Bedside     CNM and student midwife at bedside per patient request to push. CNM updated on patient status. New PIV, pitocin restarted at 7. Patient removing fetal monitoring. RN observed contractions every 5-8 minutes. SVE 7cm. CNM and student midwife to remain in department for any changes.

## 2023-07-29 NOTE — L&D DELIVERY NOTE
OB Vaginal Delivery Note    Karen Loya MRN# [id number]   Age: 28 year old YOB: 1995       GA: 41w3d  GP:   Labor Complications: None   EBL:   mL  Delivery QBL: 454 mL  Delivery Type: Vaginal, Spontaneous   ROM to Delivery Time: (Delivered) Hours: 1 Minutes: 51   Weight: 3.75 kg (8 lb 4.3 oz)    1 Minute 5 Minute 10 Minute   Apgar Totals: 8   9        MARTHA WISEMAN;NATHALIA MENON     Delivery Details:  Karen Loya, a 28 year old  female delivered a viable infant with apgars of 8  and 9 . Patient was fully dilated and pushing after   hours   minutes in active labor. Delivery was via vaginal, spontaneous  to a sterile field under intravenous  anesthesia. Infant delivered in vertex  right  occiput  anterior  position. Anterior and posterior shoulders delivered without difficulty. The cord was clamped, cut twice and 3 vessels  were noted. Cord blood was obtained in routine fashion with the following disposition: lab .  Initial gush of blood noted immediately after delivery. IM Methergine given. Bleeding slowed and fundus was firm      Cord complications: nuchal  loose x1, easily reduced over fetal head.   Placenta delivered at 2023  2:35 AM . Placental disposition was Hospital disposal . Fundal massage performed and fundus found to be firm.     Episiotomy: none    Perineum, vagina, cervix were inspected, and the following lacerations were noted:   Perineal lacerations: 1st                Any lacerations were repaired in the usual fashion using 3-0 vicryl, interrupted suture x2 .    Excellent hemostasis was noted. Needle count correct. Infant and patient in delivery room in good and stable condition.        Shalini Female-Karen [[id number]]      Labor Event Times      Active labor onset date: 23 Onset time: 10:30 PM CDT   Dilation complete date: 23 Complete time:  2:09 AM   Start pushing date/time: 2023 0215          Labor Length      3rd Stage (hrs): 0  (min): 5          Labor Events     labor?: No   steroids: None  Labor Type: Induction/Cervical ripening  Predominate monitoring during 1st stage: continuous electronic fetal monitoring       Rupture date/time: 23 0039   Rupture type: Spontaneous Rupture of Membranes  Fluid color: Clear  Fluid odor: Normal     Induction: Oxytocin  Induction date/time:      Cervical ripening date/time:      Indications for induction: Post-term Gestation     Augmentation: Oxytocin  Indications for augmentation: Ineffective Contraction Pattern       Delivery/Placenta Date and Time      Delivery Date: 23 Delivery Time:  2:30 AM   Placenta Date/Time: 2023  2:35 AM  Oxytocin given at the time of delivery: after delivery of baby  Delivering clinician: Adelina Fung CNM   Other personnel present at delivery:  Provider Role   Shannan Quevedo, ALLEN Delivery Nurse   Kaylee Gillespie, RN Delivery Nurse             Vaginal Counts       Initial count performed by 2 team members:  Two Team Members   Adelina HENSLEY RN         Needles Suture Needles Sponges (RETIRED) Instruments   Initial counts 2  5    Added to count  1     Relief counts       Final counts 2 1 5            Placed during labor Accounted for at the end of labor   FSE NA NA   IUPC NA NA   Cervidil NA NA                  Final count performed by 2 team members:  Two Team Members   Shannan Fung CNM      Final count correct?: Yes       Apgars    Living status: Living   1 Minute 5 Minute 10 Minute 15 Minute 20 Minute   Skin color: 0  1       Heart rate: 2  2       Reflex irritability: 2  2       Muscle tone: 2  2       Respiratory effort: 2  2       Total: 8  9       Apgars assigned by: KAYLEE PALMA RN       Cord      Vessels: 3 Vessels    Cord Complications: Nuchal   Nuchal Intervention: reduced         Nuchal cord description: loose nuchal cord         Cord Blood Disposition: Lab    Gases Sent?: No    Delayed cord clamping?: Yes  "   Cord Clamping Delay (seconds): >120 seconds    Stem cell collection?: No            Resuscitation    Methods: None  Output in Delivery Room: Stool       [place] Measurements      Weight: 8 lb 4.3 oz Length: 1' 10\"     Head circumference: 36.4 cm    Output in delivery room: Stool       Skin to Skin and Feeding Plan      Skin to skin initiation date/time: 1841    Skin to skin with: Mother  Skin to skin end date/time:            Labor Events and Shoulder Dystocia    Fetal Tracing Comments: pt refused continuous monitoring during second stage - intermittent auscultation with HR in 130s  Shoulder dystocia present?: Neg       Delivery (Maternal) (Provider to Complete) ([id number])    Episiotomy: None  Perineal lacerations: 1st Repaired?: Yes   Repair suture: 3-0 Vicryl  Genital tract inspection done: Pos       Blood Loss  Mother: Karen Loya #[id number]     Start of Mother's Information      Delivery Blood Loss  23 2230 - 23 0821      Delivery QBL (mL) Hospital Encounter 758 mL    Postpartum QBL (mL) Hospital Encounter 127 mL    Total  885 mL               End of Mother's Information  Mother: Karen Loya #[id number]                Delivery - Provider to Complete ([id number])    Delivering clinician: Adelina Fung CNM  Delivery Type (Choose the 1 that will go to the Birth History): Vaginal, Spontaneous                         Other personnel:  Provider Role   Shannan Quevedo, RN Delivery Nurse   Kaylee Gillespie RN Delivery Nurse                    Placenta    Date/Time: 2023  2:35 AM  Removal: Spontaneous  Disposition: Hospital disposal             Anesthesia    Method: INTRAVENOUS      Analgesic:  BIRTH HISTORY: ANALGESIC   FENTANYL CITRATE IV                 Presentation and Position    Presentation: Vertex    Position: Right Occiput Anterior                     Adelina Fung CNM    "

## 2023-07-29 NOTE — PROVIDER NOTIFICATION
07/29/23 0106   Provider Notification   Provider Name/Title Adelina Fung CNM   Method of Notification In Department     Update to CNM and student midwife on patient status. Lost PIV access, in the process of establishing new one. Patient has been off of pitocin for approx 15-20 due to loss of IV access. Contractions starting to space out. Patient still moving US/toco monitors, difficult to accurately trace.

## 2023-07-29 NOTE — PLAN OF CARE
Vss afebrile. Ff@U. Pain 2/10. Voiding with out problems. Pt c/o pain 2/10, but declined pain meds. Pt up indep in room. Breastfeeding well. Pt attentive to baby's needs.

## 2023-07-29 NOTE — PLAN OF CARE
Data: Karen Loya transferred to [id number] via wheelchair at 0515. Baby transferred via parent's arms.  Action: Receiving unit notified of transfer: Yes. Patient and family notified of room change. Report given to Joanna at 0525. Belongings sent to receiving unit. Accompanied by Registered Nurse. Oriented patient to surroundings. Call light within reach. ID bands double-checked with receiving RN.  Response: Patient tolerated transfer and is stable.    Patient refused fundal checks. Blood visualized on pad during recovery period was WDL. Pitocin turned off per patient request; patient refused restarting infusion. RN reinforced education regarding importance of fundal massage and pitocin; patient refused.

## 2023-07-29 NOTE — PLAN OF CARE
Karen is declining monitors being strapped on her belly; writer at bedside attempting to maribeth contraction pattern. Karen consents to intermittent use of the US; RN at bedside to hold monitor in place. Midwives aware of difficulty of monitoring.

## 2023-07-29 NOTE — PLAN OF CARE
Goal Outcome Evaluation:                      Received from L&D nurses around 0525H. Orientation to room and plan of care given to patient and . Encouraged breastfeeding prior to giving of formula to baby. SL in.  at bedside. Patient has been refusing to go to the bathroom to void.    0746H: Patient voided but unmeasured. Right leg a little numb but steady on feet when walking.

## 2023-07-29 NOTE — PROVIDER NOTIFICATION
07/29/23 0330   Provider Notification   Provider Name/Title Adelina Fung   Method of Notification In Department     CNM notified that Karen is refusing fundal checks; will allow slight touch to her belly to confirm position of uterus, but will not allow fundal massage or expression of blood/clots. CNM updated on QBL total. Education has been provided. Will continue to ask permission/consent for q15 bleeding/fundal exams.

## 2023-07-29 NOTE — PROGRESS NOTES
HALINA PROGRESS NOTE    SUBJECTIVE:  Karen is at the bedside, swaying with contractions. She is more uncomfortable. Her  went home to check on the kids but will be back soon. While standing, leakage of small amount of clear fluid was noted during contractions. She is requesting fentanyl.     OBJECTIVE:  BP (!) 145/60 (BP Location: Left arm, Patient Position: Semi-Parker's, Cuff Size: Adult Large)   Temp 98.2  F (36.8  C) (Oral)   Resp 20   Wt 97.5 kg (215 lb)   LMP 10/12/2022   BMI 35.78 kg/m      Fetal heart tones: Baseline 150   Variability: moderate  Accelerations: present  Decelerations: absent    Contractions: Pt is tia every 2 minutes, lasting 100 seconds and palpates strong    Cervix: 3/ 75% / -2, Vtx  ROM: possibly leaking scant clear fluid     Pitocin- 14 mu/min.  Antibiotics- PCN q4hrs, adequate GBS prophylaxis obtained   Cervical ripening: N/A    Labor Events  23: 0950 Cervix 2.5/50/-3  1241: Started pitocin  1815: Pitocin at 10 units, SVE 2/75/-2  2130: Pitocin at 14 units, SVE 3/75/-2    ASSESSMENT:  IUP @ 41w2d IOL for postdates   GBS- positive, has received adequate GBS prophylaxis  Cat I FHTs  Possible SROM     PLAN:   Continue to monitor for any leaking of fluid before declaring SROM  Pain medication IV Fentanyl   Prophylactic antibiotic for + GBS status  Anticipate   Labor induction with Pitocin  reevaluate in 2-4 hours/PRN    Adelina Fung CNM

## 2023-07-29 NOTE — PLAN OF CARE
Assumed care of patient from [id number]. Patient sleeping on sofa in room, was only able to get her to answer questions about infant's feeding - unable to obtain vitals. Stable as per 1500 report.

## 2023-07-30 PROCEDURE — 120N000001 HC R&B MED SURG/OB

## 2023-07-30 PROCEDURE — 250N000011 HC RX IP 250 OP 636: Mod: JZ | Performed by: ADVANCED PRACTICE MIDWIFE

## 2023-07-30 PROCEDURE — 250N000013 HC RX MED GY IP 250 OP 250 PS 637: Performed by: ADVANCED PRACTICE MIDWIFE

## 2023-07-30 RX ORDER — FERROUS GLUCONATE 324(38)MG
324 TABLET ORAL
Status: DISCONTINUED | OUTPATIENT
Start: 2023-07-30 | End: 2023-07-31 | Stop reason: HOSPADM

## 2023-07-30 RX ORDER — KETOROLAC TROMETHAMINE 30 MG/ML
30 INJECTION, SOLUTION INTRAMUSCULAR; INTRAVENOUS EVERY 6 HOURS PRN
Status: DISCONTINUED | OUTPATIENT
Start: 2023-07-30 | End: 2023-07-31 | Stop reason: HOSPADM

## 2023-07-30 RX ADMIN — KETOROLAC TROMETHAMINE 30 MG: 30 INJECTION, SOLUTION INTRAMUSCULAR; INTRAVENOUS at 15:37

## 2023-07-30 RX ADMIN — KETOROLAC TROMETHAMINE 30 MG: 30 INJECTION, SOLUTION INTRAMUSCULAR; INTRAVENOUS at 21:48

## 2023-07-30 RX ADMIN — KETOROLAC TROMETHAMINE 30 MG: 30 INJECTION, SOLUTION INTRAMUSCULAR; INTRAVENOUS at 09:20

## 2023-07-30 ASSESSMENT — ACTIVITIES OF DAILY LIVING (ADL)
ADLS_ACUITY_SCORE: 18
ADLS_ACUITY_SCORE: 21
ADLS_ACUITY_SCORE: 18
ADLS_ACUITY_SCORE: 21

## 2023-07-30 NOTE — PROGRESS NOTES
Cedric Mckenzie CNM Progress Note: Postpartum Day #1    2023  10:39 AM    SUBJECTIVE:  Patient is stable  and is is not tolerating activity well, patient complains of abdominal pain  Baby is rooming in  Complications since 2 hours post delivery: None and reports of uterine cramping and not tolerating fundal checks. Asked nurse to add toradol for pain.   Pain is now well controlled.  Patient is taking pain medications.  Breastfeeding status:initiated   Elimination:  She is voiding without difficulty.  She has not had a bowel movement  Desired contraception none  Denies heavy bleeding and passing large clots.  Feels OK about birth experience.    OBJECTIVE:  /76   Pulse 68   Temp 98.2  F (36.8  C) (Oral)   Resp 18   Wt 97.5 kg (215 lb)   LMP 10/12/2022   Breastfeeding Unknown   BMI 35.78 kg/m      Constitutional: healthy, alert, and moderate distress    Breasts: Currently breastfeeding    Fundus: Uterine fundus is firm, non-tender and at  below the level of the umbilicus    Perineum: Perineum is intact and/or well approximated, minimal swelling    Lochia: Lochia is appropriate for the duration of time since delivery.     ASSESSMENT:  PPD #1    Doing better    Hemoglobin   Date Value Ref Range Status   2023 8.9 (L) 11.7 - 15.7 g/dL Final   2021 9.1 (L) 11.7 - 15.7 g/dL Final   ]      PLAN:  Iron supplementation  Added toradol for uterine cramping.  Reviewed breastfeeding  Reviewed postpartum warning signs  Reviewed postpartum blues and postpartum depression warning signs  Plans nothing for contraception postpartum  Anticipated discharge tomorrow        LUCIA LEWIS CNM

## 2023-07-30 NOTE — PLAN OF CARE
Vital signs stable. Postpartum checks within defined limits. Voiding spontaneously. Ambulating independently, denies dizziness. Reports pain is well controlled with Toradol - continues to decline oral analgesics. Declined oral iron. Breast or formula feeding every 2-3 hours. Attentive to  cues, bonding well. [place]  used for all interactions.

## 2023-07-30 NOTE — PROVIDER NOTIFICATION
07/30/23 0121   Provider Notification   Provider Name/Title Sarita JAUREGUI   Method of Notification Phone   Request Evaluate-Remote   Notification Reason Medication Request     Provider informed that patient is requesting for IV pain meds for cramping pain. Patient has been refusing ibuprofen and Tylenol all day. MW said to offer oral pain meds first. Patient informed and educated but still refused to take ibuprofen.

## 2023-07-30 NOTE — PROVIDER NOTIFICATION
"   07/30/23 0857   Provider Notification   Provider Name/Title Clementina REESHAYLA   Method of Notification Phone   Request Evaluate-Remote   Notification Reason Other     Pt complaining of severe uterine cramping. She states \"it feels like I have clots in there.\" Pt further explained that after her previous pregnancy she presented to the emergency room for clots, received an IV medication, and the pain resolved. Pt is unsure of the medication she received. Pt is currently declining fundal assessment and oral pain medications. Lochia is scant. Denies passing clots. Verbal order received for IV Toradol Q6h PRN. Assess fundus after medication administration. HALINA Mcrae will come to evaluate the pt this am.    Update: Clementina updated in department. Toradol effective per pt. Fundus firm, 1 cm below umbilicus, no clots noted when pt up to bathroom.  "

## 2023-07-30 NOTE — PLAN OF CARE
Goal Outcome Evaluation:                      VSS, PP checks WNL. SL flushed, patent. Complains of severe uterine cramping but refuses oral pain meds. Breast and bottle-feeding. Voiding without difficulty. Feeling some discomfort in both thighs but walks steady on feet. SO had work overnight.

## 2023-07-31 VITALS
WEIGHT: 207.01 LBS | HEART RATE: 80 BPM | SYSTOLIC BLOOD PRESSURE: 118 MMHG | TEMPERATURE: 98 F | BODY MASS INDEX: 34.45 KG/M2 | DIASTOLIC BLOOD PRESSURE: 80 MMHG | RESPIRATION RATE: 16 BRPM

## 2023-07-31 PROCEDURE — 250N000013 HC RX MED GY IP 250 OP 250 PS 637: Performed by: ADVANCED PRACTICE MIDWIFE

## 2023-07-31 PROCEDURE — 250N000011 HC RX IP 250 OP 636: Mod: JZ | Performed by: ADVANCED PRACTICE MIDWIFE

## 2023-07-31 RX ORDER — DOCUSATE SODIUM 100 MG/1
100 CAPSULE, LIQUID FILLED ORAL DAILY
Qty: 30 CAPSULE | Refills: 1 | Status: SHIPPED | OUTPATIENT
Start: 2023-08-01

## 2023-07-31 RX ORDER — IBUPROFEN 800 MG/1
800 TABLET, FILM COATED ORAL EVERY 6 HOURS PRN
COMMUNITY
Start: 2023-07-31

## 2023-07-31 RX ORDER — ACETAMINOPHEN 325 MG/1
650 TABLET ORAL EVERY 4 HOURS PRN
COMMUNITY
Start: 2023-07-31

## 2023-07-31 RX ADMIN — KETOROLAC TROMETHAMINE 30 MG: 30 INJECTION, SOLUTION INTRAMUSCULAR; INTRAVENOUS at 04:06

## 2023-07-31 RX ADMIN — KETOROLAC TROMETHAMINE 30 MG: 30 INJECTION, SOLUTION INTRAMUSCULAR; INTRAVENOUS at 10:03

## 2023-07-31 RX ADMIN — BENZOCAINE: 11.4 AEROSOL, SPRAY TOPICAL at 13:32

## 2023-07-31 ASSESSMENT — ACTIVITIES OF DAILY LIVING (ADL)
ADLS_ACUITY_SCORE: 18

## 2023-07-31 NOTE — DISCHARGE SUMMARY
[facility]    Discharge Summary  Obstetrics    Date of Admission:  7/28/2023  Date of Discharge:  7/31/2023  Discharging Provider: Nazia Guerrero CNM  Date of Service (when I saw the patient): 07/31/23    Discharge Diagnoses   Postpartum vaginal delivery    History of Present Illness   Karen Loya is a 28 year old female who presented for postdates IOL.     Hospital Course   The patient's hospital course was unremarkable.  She recovered as anticipated and experienced no post-delivery complications. She has continued anemia and should continue oral iron and start on stool softeners. On discharge, her pain was well controlled but she did need the addition of Toradol for pain. Vaginal bleeding is similar to peak menstrual flow.  Voiding without difficulty.  Ambulating well and tolerating a normal diet.  No fevers.  Breastfeeding well.  Infant is stable.  She was discharged on post-partum day #2.    Post-partum hemoglobin:   Hemoglobin   Date Value Ref Range Status   07/29/2023 8.9 (L) 11.7 - 15.7 g/dL Final   05/12/2021 9.1 (L) 11.7 - 15.7 g/dL Final       Discharge Disposition   Discharged to home   Condition at discharge: Stable    Primary Care Physician   Physician No Ref-Primary    Consultations This Hospital Stay   ANESTHESIOLOGY IP CONSULT  LACTATION IP CONSULT    Discharge Orders      Activity    Activity as tolerated     Reason for your hospital stay    Maternity care     Follow Up    Follow up with provider in 2 weeks and 6 weeks for post-delivery checks     Diet    Resume previous diet     Discharge Medications   Current Discharge Medication List        START taking these medications    Details   acetaminophen (TYLENOL) 325 MG tablet Take 2 tablets (650 mg) by mouth every 4 hours as needed for mild pain or fever (greater than or equal to 38  C /100.4  F (oral) or 38.5  C/ 101.4  F (core).)    Associated Diagnoses: Postpartum pain      docusate sodium (COLACE) 100 MG capsule Take 1  capsule (100 mg) by mouth daily  Qty: 30 capsule, Refills: 1    Associated Diagnoses: Anemia, postpartum      ibuprofen (ADVIL/MOTRIN) 800 MG tablet Take 1 tablet (800 mg) by mouth every 6 hours as needed for other (cramping)    Associated Diagnoses: Postpartum pain           CONTINUE these medications which have NOT CHANGED    Details   ferrous gluconate (FERGON) 324 (38 Fe) MG tablet Take 1 tablet (324 mg) by mouth daily (with breakfast)  Qty: 60 tablet, Refills: 3    Comments: Take with vitamin C juice  Associated Diagnoses: Anemia during pregnancy in third trimester      Prenatal Vit-Fe Fumarate-FA (PRENATAL MULTIVITAMIN W/IRON) 27-0.8 MG tablet Take 1 tablet by mouth daily  Qty: 90 tablet, Refills: 3    Associated Diagnoses: Encounter for supervision of other normal pregnancy in second trimester           STOP taking these medications       aspirin (ASA) 81 MG chewable tablet Comments:   Reason for Stopping:             Allergies   No Known Allergies     RTC for postpartum visits at 2 and 6 weeks. Page MWOC with any concerns before/between visits.     Nazia Guerrero CNM

## 2023-07-31 NOTE — PLAN OF CARE
Vitals and fundal checks stable. Pain meds given with pain reassessment 1 hour after. Voiding without difficulty. No nausea. Ambulating independently. Appropriate bonding/cares for baby. Breast and formula feeding. Education completed with all cares. [facility]  used with all cares.

## 2023-07-31 NOTE — PROGRESS NOTES
Data: Vital signs within normal limits. Postpartum checks within normal limits - see flow record. Patient eating and drinking normally. Patient able to empty bladder independently and is up ambulating. No apparent signs of infection.  Patient performing self cares and is able to care for infant.  Action: Patient medicated during the shift for pain. See MAR. Patient reassessed within 1 hour after each medication and pain was improved - patient stated she was comfortable. Patient education complete. See flow record.  Response: Positive attachment behaviors observed with infant.   Plan: Anticipate discharge today.

## 2023-07-31 NOTE — DISCHARGE INSTRUCTIONS
Warning Signs after Having a Baby    Keep this paper on your fridge or somewhere else where you can see it.    Call your provider if you have any of these symptoms up to 12 weeks after having your baby.    Thoughts of hurting yourself or your baby  Pain in your chest or trouble breathing  Severe headache not helped by pain medicine  Eyesight concerns (blurry vision, seeing spots or flashes of light, other changes to eyesight)  Fainting, shaking or other signs of a seizure    Call 9-1-1 if you feel that it is an emergency.     The symptoms below can happen to anyone after giving birth. They can be very serious. Call your provider if you have any of these warning signs.    My provider s phone number: _______________________    Losing too much blood (hemorrhage)    Call your provider if you soak through a pad in less than an hour or pass blood clots bigger than a golf ball. These may be signs that you are bleeding too much.    Blood clots in the legs or lungs    After you give birth, your body naturally clots its blood to help prevent blood loss. Sometimes this increased clotting can happen in other areas of the body, like the legs or lungs. This can block your blood flow and be very dangerous.     Call your provider if you:  Have a red, swollen spot on the back of your leg that is warm or painful when you touch it.   Are coughing up blood.     Infection    Call your provider if you have any of these symptoms:  Fever of 100.4 F (38 C) or higher.  Pain or redness around your stitches if you had an incision.   Any yellow, white, or green fluid coming from places where you had stitches or surgery.    Mood Problems (postpartum depression)    Many people feel sad or have mood changes after having a baby. But for some people, these mood swings are worse.     Call your provider right away if you feel so anxious or nervous that you can't care for yourself or your baby.    Preeclampsia (high blood pressure)    Even if you  didn't have high blood pressure when you were pregnant, you are at risk for the high blood pressure disease called preeclampsia. This risk can last up to 12 weeks after giving birth.     Call your provider if you have:   Pain on your right side under your rib cage  Sudden swelling in the hands and face    Remember: You know your body. If something doesn't feel right, get medical help.     For informational purposes only. Not to replace the advice of your health care provider. Copyright 2020 [facility]. All rights reserved. Clinically reviewed by Veda Brown, RNC-OB, MSN. [employer] [id number] - Rev 02/23.

## 2023-08-04 ENCOUNTER — HOSPITAL ENCOUNTER (EMERGENCY)
Facility: CLINIC | Age: 28
Discharge: HOME | End: 2023-08-04
Attending: EMERGENCY MEDICINE | Admitting: EMERGENCY MEDICINE
Payer: COMMERCIAL

## 2023-08-04 ENCOUNTER — NURSE TRIAGE (OUTPATIENT)
Dept: NURSING | Facility: CLINIC | Age: 28
End: 2023-08-04

## 2023-08-04 ENCOUNTER — APPOINTMENT (OUTPATIENT)
Dept: ULTRASOUND IMAGING | Facility: CLINIC | Age: 28
End: 2023-08-04
Attending: EMERGENCY MEDICINE
Payer: COMMERCIAL

## 2023-08-04 ENCOUNTER — TELEPHONE (OUTPATIENT)
Dept: MIDWIFE SERVICES | Facility: CLINIC | Age: 28
End: 2023-08-04

## 2023-08-04 VITALS
DIASTOLIC BLOOD PRESSURE: 75 MMHG | OXYGEN SATURATION: 100 % | SYSTOLIC BLOOD PRESSURE: 113 MMHG | HEART RATE: 76 BPM | RESPIRATION RATE: 18 BRPM | TEMPERATURE: 97.9 F

## 2023-08-04 LAB
ERYTHROCYTE [DISTWIDTH] IN BLOOD BY AUTOMATED COUNT: 20.9 % (ref 10–15)
HCG INTACT+B SERPL-ACNC: 69 MIU/ML
HCT VFR BLD AUTO: 27 % (ref 35–47)
HGB BLD-MCNC: 8.3 G/DL (ref 11.7–15.7)
HOLD SPECIMEN: NORMAL
MCH RBC QN AUTO: 21.5 PG (ref 26.5–33)
MCHC RBC AUTO-ENTMCNC: 30.7 G/DL (ref 31.5–36.5)
MCV RBC AUTO: 70 FL (ref 78–100)
PLATELET # BLD AUTO: 273 10E3/UL (ref 150–450)
RBC # BLD AUTO: 3.86 10E6/UL (ref 3.8–5.2)
WBC # BLD AUTO: 5.5 10E3/UL (ref 4–11)

## 2023-08-04 PROCEDURE — 36415 COLL VENOUS BLD VENIPUNCTURE: CPT | Performed by: EMERGENCY MEDICINE

## 2023-08-04 PROCEDURE — 85027 COMPLETE CBC AUTOMATED: CPT | Performed by: EMERGENCY MEDICINE

## 2023-08-04 PROCEDURE — 99284 EMERGENCY DEPT VISIT MOD MDM: CPT | Mod: 25

## 2023-08-04 PROCEDURE — 84702 CHORIONIC GONADOTROPIN TEST: CPT | Performed by: EMERGENCY MEDICINE

## 2023-08-04 PROCEDURE — 76856 US EXAM PELVIC COMPLETE: CPT

## 2023-08-04 RX ORDER — METHYLERGONOVINE MALEATE 0.2 MG/1
0.2 TABLET ORAL EVERY 8 HOURS
Qty: 9 TABLET | Refills: 0 | Status: SHIPPED | OUTPATIENT
Start: 2023-08-04 | End: 2023-08-07

## 2023-08-04 ASSESSMENT — ACTIVITIES OF DAILY LIVING (ADL)
ADLS_ACUITY_SCORE: 35
ADLS_ACUITY_SCORE: 35

## 2023-08-04 NOTE — ED PROVIDER NOTES
History     Chief Complaint:  Postpartum bleeding  The history is provided by the patient. A  was used ([place]an).      Karen Loya is a  28 year old female who presents via EMS with postpartum vaginal bleeding. She delivered vaginally 6 days ago.  Since then, she has had abdominal cramping pain and vaginal bleeding with clots.  This was more than she experienced in previous pregnancies.    Independent Historian:   None - Patient Only    Review of External Notes:   I reviewed the postpartum discharge summary from 23.  She delivered on the .  Hgb was 8.9    Medications:    Colace    Past Medical History:    Iron deficiency anemia    Physical Exam   Patient Vitals for the past 24 hrs:   BP Temp Temp src Pulse Resp SpO2   23 0606 -- -- -- -- -- 100 %   23 0515 108/68 -- -- 67 -- 99 %   23 0430 116/77 -- -- 61 -- 99 %   23 0415 113/78 -- -- 65 -- 99 %   23 0345 109/67 -- -- 82 -- 99 %   23 0330 99/48 -- -- 79 -- 100 %   23 0315 112/80 -- -- 71 -- 100 %   23 0257 112/64 97.9  F (36.6  C) Oral 81 18 99 %      Physical Exam  Nursing note and vitals reviewed.  Constitutional: Cooperative.   Cardiovascular: Normal rate, regular rhythm and normal heart sounds.  No murmur.  Pulmonary/Chest: Effort normal and breath sounds normal. No respiratory distress. No wheezes  Abdominal: Soft. Normal appearance and bowel sounds are normal. No distension. There is mild suprapubic tenderness. There is no rigidity and no guarding.   Neurological: Alert.  Oriented x3  Skin: Skin is warm and dry.   Psychiatric: Normal mood and affect.      Emergency Department Course     Imaging:  US Pelvis Complete without Transvaginal   Final Result   IMPRESSION:   1.  Anteverted uterus. Echogenic material within the endometrium, worrisome for retained products of conception.      2.  Right ovary is normal. Left ovary obscured due to bowel gas.      3.  No free fluid in the  dependent pelvis.      Report per radiology    Laboratory:  Labs Ordered and Resulted from Time of ED Arrival to Time of ED Departure   CBC WITH PLATELETS - Abnormal       Result Value    WBC Count 5.5      RBC Count 3.86      Hemoglobin 8.3 (*)     Hematocrit 27.0 (*)     MCV 70 (*)     MCH 21.5 (*)     MCHC 30.7 (*)     RDW 20.9 (*)     Platelet Count 273     HCG QUANTITATIVE PREGNANCY - Abnormal    hCG Quantitative 69 (*)         Interventions:  Medications - No data to display     Assessments:  0327 Initial assessment. I gathered history and examined the patient as noted above.   607 I rechecked the patient and explained findings.     Independent Interpretation (X-rays, CTs, rhythm strip):  None    Consultations/Discussion of Management or Tests:  529 I consulted with Adelina Fung CNM OB/Gyn.  She recommended I speak with Dr. Hummel.  558 I consulted with Dr. Alves, OB/Gyn.  He recommended Methergine and discharge    Social Determinants of Health affecting care:   None    Disposition:  The patient was discharged to home.     Impression & Plan      Medical Decision Makin-year-old G4, P4 female status post vaginal delivery on  who presents with passage of clots and lower abdominal cramping.  Work-up here raises the possibility of retained products of conception with a detectable quantitative hCG and ultrasound findings as above.  I discussed the case with the on-call obstetrician who is recommended Methergine 3 times daily for 3 days.  Patient's hemoglobin is stable at 8.3 down from 8.9 at discharge.  No indication for D&C at this time after discussion with the obstetrics physician.  Patient to follow-up in clinic on Monday      Diagnosis:    ICD-10-CM    1. Postpartum hemorrhage, unspecified type  O72.1            Discharge Medications:  New Prescriptions    METHYLERGONOVINE (METHERGINE) 0.2 MG TABLET    Take 1 tablet (200 mcg) by mouth every 8 hours for 3 days          Scribe Disclosure:  I  Parisa Myers, am serving as a scribe at 3:29 AM on 8/4/2023 to document services personally performed by Eliseo Guthrie MD based on my observations and the provider's statements to me.     8/4/2023   Eliseo Guthrie MD Amdahl, John, MD  08/04/23 0637

## 2023-08-04 NOTE — ED TRIAGE NOTES
Pt arrives via EMS, gave birth on 7/29, since then patient reports abdominal cramping that feel like contractions and moderate vaginal bleeding with clots. ABCs intact, A&Ox4,  on way, patient is [place] speaking.

## 2023-08-04 NOTE — TELEPHONE ENCOUNTER
Reason for Call:  Appointment Request    Patient requesting this type of appt:  Hospital/ED Follow-Up     Requested provider: Clementina Smith or other appropriate provider    Reason patient unable to be scheduled: Not within requested timeframe    When does patient want to be seen/preferred time: Same day    Comments: Patient stated she had an ER visit today 8/4/2023 and after doing an ultrasound, she was told there was placenta left in her uterus. She was advised to be seen as as possible. Patient is hoping to be worked in for an appointment today. Please advise. FNA referred.    Okay to leave a detailed message?: Yes at Home number on file [phone] (home)    Call taken on 8/4/2023 at 7:07 AM by Teresa Meehan

## 2023-08-04 NOTE — ED NOTES
"Pt discharge per MD order. Pt is alert and oriented x 3 to her own ability, not appear in acute distress. VS. Stable, Cardiopulmonary status stable. Ambulatory with steady gait. Still in pain, request \"pain med through IV\" \"Taking that thing out\" discussed the tx plan and potential more cramping with Rx, pt expressed understanding.  Discharge and follow-up instruction given to patient with  present, pt has no learning barrier and demonstrates understanding. Pt stable to be discharge.   "

## 2023-08-04 NOTE — TELEPHONE ENCOUNTER
Pt was seen in the ED this am and states that she was told that some of the babies placenta may still be inside of her after the birth of her baby     Pt is having a lot of abdominal pain - states that she is unable to walk     Pt rates abdominal pain 10/10    Per disposition: Go to ED Now     Care advice given per protocol and when to call back. Pt verbalized understanding and agrees to plan of care.    Ayaka Nielson RN  [facility] Nurse Advisor  6:48 AM 8/4/2023          Reason for Disposition   SEVERE abdominal pain (e.g., excruciating)    Additional Information   Negative: Passed out (i.e., fainted, collapsed and was not responding)   Negative: Shock suspected (e.g., cold/pale/clammy skin, too weak to stand, low BP, rapid pulse)   Negative: Sounds like a life-threatening emergency to the triager   Negative: Chest pain   Negative: Pain is mainly in upper abdomen (if needed ask: 'is it mainly above the belly button?')   Negative: Abdominal pain and pregnant < 20 weeks   Negative: Abdominal pain and pregnant 20 or more weeks    Protocols used: Abdominal Pain - Female-A-[place]

## 2023-08-07 ENCOUNTER — TELEPHONE (OUTPATIENT)
Dept: MIDWIFE SERVICES | Facility: CLINIC | Age: 28
End: 2023-08-07

## 2023-08-07 ENCOUNTER — OFFICE VISIT (OUTPATIENT)
Dept: MIDWIFE SERVICES | Facility: CLINIC | Age: 28
End: 2023-08-07
Payer: COMMERCIAL

## 2023-08-07 VITALS — DIASTOLIC BLOOD PRESSURE: 58 MMHG | SYSTOLIC BLOOD PRESSURE: 108 MMHG | WEIGHT: 196 LBS | BODY MASS INDEX: 32.62 KG/M2

## 2023-08-07 DIAGNOSIS — O02.9 PRODUCTS OF CONCEPTION, ABNORMAL: Primary | ICD-10-CM

## 2023-08-07 PROCEDURE — 99213 OFFICE O/P EST LOW 20 MIN: CPT | Performed by: ADVANCED PRACTICE MIDWIFE

## 2023-08-07 NOTE — NURSING NOTE
"Chief Complaint   Patient presents with    Consult    Follow Up     ED follow up appt.  Still having pain. Was told placenta still retained.       Initial /58   Wt 88.9 kg (196 lb)   LMP 10/12/2022   Breastfeeding Yes   BMI 32.62 kg/m   Estimated body mass index is 32.62 kg/m  as calculated from the following:    Height as of 12/15/21: 1.651 m (5' 5\").    Weight as of this encounter: 88.9 kg (196 lb).  BP completed using cuff size: regular    Questioned patient about current smoking habits.  Pt. has never smoked.          Slight bleeding and pain still after delivery.  Following up from ED.    Tammy Floyd LPN on 2023 at 2:19 PM                "

## 2023-08-07 NOTE — TELEPHONE ENCOUNTER
Pt was seen today for bleeding after deliver.  Pt was instructed by HALINA Peterson to follow up after her US this week.  Kristen would like pt to be seen Friday this week or Monday next week.  Pt will call back with which appt she wants.    Tammy Floyd LPN on 8/7/2023 at 3:38 PM

## 2023-08-07 NOTE — PROGRESS NOTES
SUBJECTIVE:                                                   Karen Loya is a 28 year old who presents to clinic today for the following health issue(s):  Patient presents with:  Consult  Follow Up: ED follow up appt.  Still having pain. Was told placenta still retained.      HPI:  Pt here for follow-up related to retained products of conception. Is having regular lochia at this time but is still having cramping and pain. She did not take the Methergine that was prescribed. No fever, urinating, and passing gas. She delivered on 7/26/23, infant is doing well.      Problem list and histories reviewed & adjusted, as indicated.  Additional history: as documented.    Patient Active Problem List   Diagnosis    History of pre-eclampsia in prior pregnancy, currently pregnant    Supervision of high risk pregnancy in third trimester    Obesity affecting pregnancy in third trimester    History of third degree perineal laceration    Vitamin D deficiency    Anemia of mother in pregnancy, antepartum, third trimester    Poor fetal growth affecting management of mother in third trimester, single or unspecified fetus    Post-dates pregnancy    Postpartum pain    Anemia, iron deficiency    Circumvallate placenta    Labor and delivery, indication for care     No past surgical history on file.   Social History     Tobacco Use    Smoking status: Never    Smokeless tobacco: Never   Substance Use Topics    Alcohol use: Never      No data available              Current Outpatient Medications   Medication Sig    acetaminophen (TYLENOL) 325 MG tablet Take 2 tablets (650 mg) by mouth every 4 hours as needed for mild pain or fever (greater than or equal to 38  C /100.4  F (oral) or 38.5  C/ 101.4  F (core).)    docusate sodium (COLACE) 100 MG capsule Take 1 capsule (100 mg) by mouth daily    ibuprofen (ADVIL/MOTRIN) 800 MG tablet Take 1 tablet (800 mg) by mouth every 6 hours as needed for other (cramping)    ferrous gluconate (FERGON)  324 (38 Fe) MG tablet Take 1 tablet (324 mg) by mouth daily (with breakfast) (Patient not taking: Reported on 8/7/2023)    methylergonovine (METHERGINE) 0.2 MG tablet Take 1 tablet (200 mcg) by mouth every 8 hours for 3 days (Patient not taking: Reported on 8/7/2023)    Prenatal Vit-Fe Fumarate-FA (PRENATAL MULTIVITAMIN W/IRON) 27-0.8 MG tablet Take 1 tablet by mouth daily (Patient not taking: Reported on 8/7/2023)     No current facility-administered medications for this visit.     No Known Allergies    OBJECTIVE:     /58   Wt 88.9 kg (196 lb)   LMP 10/12/2022   Breastfeeding Yes   BMI 32.62 kg/m    Body mass index is 32.62 kg/m .    PHYSICAL EXAM:  Constitutional:  Appearance: Well nourished, well developed alert, in no acute distress  Chest:  Respiratory Effort:  Breathing unlabored.   Abdomen: LQS TTT, tone normal without rigidity or guarding, no masses present, umbilicus without lesions  Psychiatric:  Mentation appears fatigued    In-Clinic Test Results:  No results found for this or any previous visit (from the past 24 hour(s)).    ASSESSMENT/PLAN:                                                        ICD-10-CM    1. Products of conception, abnormal  O02.9 US Pelvic Complete with Transvaginal          PLAN:  Stressed that patient needs to  Methergine today and take it as directed; reviewed that this will decrease pain  Reviewed warning s/sx and when to return to ED  F/U pelvic US this week after Methergine      LAXMI Del Cid CNM

## 2023-08-07 NOTE — NURSING NOTE
"Chief Complaint   Patient presents with    Consult    Follow Up     ED follow up appt.  Still having pain. Was told placenta still retained.       Initial /58   Wt 88.9 kg (196 lb)   LMP 10/12/2022   Breastfeeding Yes   BMI 32.62 kg/m   Estimated body mass index is 32.62 kg/m  as calculated from the following:    Height as of 12/15/21: 1.651 m (5' 5\").    Weight as of this encounter: 88.9 kg (196 lb).      Helped schedule US appt for tomorrow at 9 am in [facility].  Reminded her to  medication at pharmacy for bleeding.  Called her to schedule a follow up appt with LAWRENCE on Friday 10am or 2pm.  She will call back to confirm which appt.    Tammy Floyd LPN on 8/7/2023 at 3:35 PM             "

## 2023-08-08 ENCOUNTER — ANCILLARY PROCEDURE (OUTPATIENT)
Dept: ULTRASOUND IMAGING | Facility: CLINIC | Age: 28
End: 2023-08-08
Payer: COMMERCIAL

## 2023-08-08 DIAGNOSIS — O02.9 PRODUCTS OF CONCEPTION, ABNORMAL: ICD-10-CM

## 2023-08-08 PROCEDURE — 76856 US EXAM PELVIC COMPLETE: CPT | Performed by: OBSTETRICS & GYNECOLOGY

## 2023-08-08 NOTE — TELEPHONE ENCOUNTER
Lets get results of ultrasound first, and then decide if she needs follow up. She already has an appointment with me this Friday. If she has not finished her methergine and had her ultrasound before then, the Friday appointment should be rescheduled.    Thank you,    LAXMI Sanchez, REEM

## 2023-08-08 NOTE — TELEPHONE ENCOUNTER
Clementina mentions getting another ultrasound after finishing methergine.  (See result note)  Do you guys want both an ultrasound and appt to f/u?    Chasity CORTES RN BSN

## 2023-08-11 ENCOUNTER — TELEPHONE (OUTPATIENT)
Dept: MIDWIFE SERVICES | Facility: CLINIC | Age: 28
End: 2023-08-11

## 2023-08-11 DIAGNOSIS — O02.9 PRODUCTS OF CONCEPTION, ABNORMAL: Primary | ICD-10-CM

## 2023-08-11 NOTE — TELEPHONE ENCOUNTER
Call to pt via [facility] , pt states she has 2 methergine pills left that she will take today, stressed the importance of this and that she will need an US to make sure all the products of conception are gone. Pt agrees to plan and transferred to .    ALLEN Recinos

## 2023-08-11 NOTE — TELEPHONE ENCOUNTER
Please call Karen and confirm that she has completed her full course of methergine (one tablet 3x/day for three days, all nine pills). If she has finished her methergine, she needs a follow up ultrasound to confirm that she has passed all products of conception. I have ordered the ultrasound.  If she has not taken her pills, she needs to be schedule with an MD for follow up.

## 2023-08-16 ENCOUNTER — ANCILLARY PROCEDURE (OUTPATIENT)
Dept: ULTRASOUND IMAGING | Facility: CLINIC | Age: 28
End: 2023-08-16
Attending: ADVANCED PRACTICE MIDWIFE
Payer: COMMERCIAL

## 2023-08-16 DIAGNOSIS — O02.9 PRODUCTS OF CONCEPTION, ABNORMAL: ICD-10-CM

## 2023-08-16 PROCEDURE — 76856 US EXAM PELVIC COMPLETE: CPT | Performed by: OBSTETRICS & GYNECOLOGY

## 2023-08-17 ENCOUNTER — APPOINTMENT (OUTPATIENT)
Dept: INTERPRETER SERVICES | Facility: CLINIC | Age: 28
End: 2023-08-17
Payer: COMMERCIAL

## 2023-11-29 NOTE — PROGRESS NOTES
[facility] PROGRESS NOTE    SUBJECTIVE:  Karen is requesting a cervical exam. IV access was lost so pt was not on pitocin for about 30 minutes.     OBJECTIVE:  /57 (BP Location: Left arm, Patient Position: Semi-Parker's, Cuff Size: Adult Large)   Temp 97.8  F (36.6  C) (Oral)   Resp 18   Wt 97.5 kg (215 lb)   LMP 10/12/2022   BMI 35.78 kg/m      Fetal heart tones: Baseline 120   Variability: moderate  Accelerations: present  Decelerations: absent    Contractions: Pt is tia every 2-3 minutes, lasting 100 seconds and palpates strong    Cervix: 7/ 90% / -1, Vtx  ROM: clear fluid    Pitocin- 7 mu/min.  Antibiotics- PCN q4hrs, adequate GBS prophylaxis obtained     Cervical ripening: N/A    Labor Events  23: 0950 Cervix 2.5/50/-3  1241: Started pitocin  1815: Pitocin at 10 units, SVE 2/75/-2  2130: Pitocin at 14 units, SVE 3/75/-2  0030: Pitocin at 14 units, SVE 6/90/-2, SROM  0130: Pitocin at 7 units, SVE 7/90/-1    ASSESSMENT:  IUP @ 41w3d IOL for postdates   GBS- positive  Cat I FHTs  IV access reestablished, pitocin restarted      PLAN:   Encouraged position changes to promote fetal descent   Prophylactic antibiotic for + GBS status  Anticipate   Labor induction with Pitocin  reevaluate in 2-4 hours/PRN    Adelina Fung CNM    no

## 2024-05-21 ENCOUNTER — OFFICE VISIT (OUTPATIENT)
Dept: URGENT CARE | Facility: URGENT CARE | Age: 29
End: 2024-05-21
Payer: COMMERCIAL

## 2024-05-21 VITALS
HEART RATE: 71 BPM | WEIGHT: 192 LBS | SYSTOLIC BLOOD PRESSURE: 108 MMHG | BODY MASS INDEX: 31.95 KG/M2 | TEMPERATURE: 98.7 F | DIASTOLIC BLOOD PRESSURE: 60 MMHG | OXYGEN SATURATION: 100 %

## 2024-05-21 DIAGNOSIS — H00.012 HORDEOLUM EXTERNUM OF RIGHT LOWER EYELID: Primary | ICD-10-CM

## 2024-05-21 DIAGNOSIS — H60.502 ACUTE OTITIS EXTERNA OF LEFT EAR, UNSPECIFIED TYPE: ICD-10-CM

## 2024-05-21 PROCEDURE — 99214 OFFICE O/P EST MOD 30 MIN: CPT | Performed by: FAMILY MEDICINE

## 2024-05-21 RX ORDER — ERYTHROMYCIN 5 MG/G
0.5 OINTMENT OPHTHALMIC 3 TIMES DAILY
Qty: 3.5 G | Refills: 0 | Status: SHIPPED | OUTPATIENT
Start: 2024-05-21 | End: 2024-05-28

## 2024-05-21 RX ORDER — NEOMYCIN SULFATE, POLYMYXIN B SULFATE AND HYDROCORTISONE 10; 3.5; 1 MG/ML; MG/ML; [USP'U]/ML
3 SUSPENSION/ DROPS AURICULAR (OTIC) 4 TIMES DAILY
Qty: 10 ML | Refills: 0 | Status: SHIPPED | OUTPATIENT
Start: 2024-05-21 | End: 2024-05-31

## 2024-05-21 NOTE — ADDENDUM NOTE
Addended by: JASWANT BLACK JR. on: 5/21/2024 12:49 PM     Modules accepted: Orders, Level of Service

## 2024-05-21 NOTE — PROGRESS NOTES
EPICSPMNEYESUBJECTIVE:Chief Complaint:   Chief Complaint   Patient presents with    Urgent Care     Right eye has stye  lower lid for 2 months.something fell in her ear left side on Friday she was wearing a scarf with beads and it is painful      History of Present Illness: Karen Loya is a 29 year old female who presents complaining of tender bump on eyelid for few days.  Onset/timing: gradual.   Associated Signs and Symptoms: discomfort   Treatment measures tried include: none   Contact wearer : No    Past Medical History:   Diagnosis Date    Iron deficiency anemia      No Known Allergies  Social History     Tobacco Use    Smoking status: Never    Smokeless tobacco: Never   Substance Use Topics    Alcohol use: Never       ROS:  no fevers  no photophobia, vision change  SKIN: no eythema    OBJECTIVE:  /60   Pulse 71   Temp 98.7  F (37.1  C) (Tympanic)   Wt 87.1 kg (192 lb)   LMP 04/05/2024 (Approximate)   SpO2 100%   BMI 31.95 kg/m     General: no acute distress  Eye exam: tender enlarged lump eyelid.  PERRLA, no photophobia, conjunctiva clear  Left ear       ICD-10-CM    1. Hordeolum externum of right lower eyelid  H00.012 erythromycin (ROMYCIN) 5 MG/GM ophthalmic ointment     Adult Eye  Referral      2. Acute otitis externa of left ear, unspecified type  H60.502 neomycin-polymyxin-hydrocortisone (CORTISPORIN) 3.5-23985-1 otic suspension        warms packs, f/u Opthalmology if persists as some sty's need I&D.

## 2024-08-13 ENCOUNTER — OFFICE VISIT (OUTPATIENT)
Dept: URGENT CARE | Facility: URGENT CARE | Age: 29
End: 2024-08-13
Payer: COMMERCIAL

## 2024-08-13 ENCOUNTER — ANCILLARY PROCEDURE (OUTPATIENT)
Dept: GENERAL RADIOLOGY | Facility: CLINIC | Age: 29
End: 2024-08-13
Attending: NURSE PRACTITIONER
Payer: COMMERCIAL

## 2024-08-13 VITALS
TEMPERATURE: 99 F | WEIGHT: 189 LBS | HEART RATE: 74 BPM | OXYGEN SATURATION: 98 % | SYSTOLIC BLOOD PRESSURE: 101 MMHG | BODY MASS INDEX: 31.45 KG/M2 | DIASTOLIC BLOOD PRESSURE: 69 MMHG

## 2024-08-13 DIAGNOSIS — M25.472 LEFT ANKLE SWELLING: ICD-10-CM

## 2024-08-13 DIAGNOSIS — M25.572 ACUTE LEFT ANKLE PAIN: ICD-10-CM

## 2024-08-13 DIAGNOSIS — M25.572 ACUTE LEFT ANKLE PAIN: Primary | ICD-10-CM

## 2024-08-13 LAB
BASOPHILS # BLD AUTO: 0 10E3/UL (ref 0–0.2)
BASOPHILS NFR BLD AUTO: 1 %
D DIMER PPP FEU-MCNC: 0.38 UG/ML FEU (ref 0–0.5)
EOSINOPHIL # BLD AUTO: 0.3 10E3/UL (ref 0–0.7)
EOSINOPHIL NFR BLD AUTO: 5 %
ERYTHROCYTE [DISTWIDTH] IN BLOOD BY AUTOMATED COUNT: 18.6 % (ref 10–15)
ERYTHROCYTE [SEDIMENTATION RATE] IN BLOOD BY WESTERGREN METHOD: 22 MM/HR (ref 0–20)
HCT VFR BLD AUTO: 35 % (ref 35–47)
HGB BLD-MCNC: 11.4 G/DL (ref 11.7–15.7)
IMM GRANULOCYTES # BLD: 0 10E3/UL
IMM GRANULOCYTES NFR BLD: 0 %
LYMPHOCYTES # BLD AUTO: 2.2 10E3/UL (ref 0.8–5.3)
LYMPHOCYTES NFR BLD AUTO: 36 %
MCH RBC QN AUTO: 25.6 PG (ref 26.5–33)
MCHC RBC AUTO-ENTMCNC: 32.6 G/DL (ref 31.5–36.5)
MCV RBC AUTO: 79 FL (ref 78–100)
MONOCYTES # BLD AUTO: 0.7 10E3/UL (ref 0–1.3)
MONOCYTES NFR BLD AUTO: 11 %
NEUTROPHILS # BLD AUTO: 2.9 10E3/UL (ref 1.6–8.3)
NEUTROPHILS NFR BLD AUTO: 47 %
PLATELET # BLD AUTO: 267 10E3/UL (ref 150–450)
RBC # BLD AUTO: 4.46 10E6/UL (ref 3.8–5.2)
URATE SERPL-MCNC: 5.3 MG/DL (ref 2.4–5.7)
WBC # BLD AUTO: 6.2 10E3/UL (ref 4–11)

## 2024-08-13 PROCEDURE — 85025 COMPLETE CBC W/AUTO DIFF WBC: CPT | Performed by: NURSE PRACTITIONER

## 2024-08-13 PROCEDURE — 84550 ASSAY OF BLOOD/URIC ACID: CPT | Performed by: NURSE PRACTITIONER

## 2024-08-13 PROCEDURE — 73610 X-RAY EXAM OF ANKLE: CPT | Mod: TC | Performed by: RADIOLOGY

## 2024-08-13 PROCEDURE — 99213 OFFICE O/P EST LOW 20 MIN: CPT | Performed by: NURSE PRACTITIONER

## 2024-08-13 PROCEDURE — 85379 FIBRIN DEGRADATION QUANT: CPT | Performed by: NURSE PRACTITIONER

## 2024-08-13 PROCEDURE — 36415 COLL VENOUS BLD VENIPUNCTURE: CPT | Performed by: NURSE PRACTITIONER

## 2024-08-13 PROCEDURE — 85652 RBC SED RATE AUTOMATED: CPT | Performed by: NURSE PRACTITIONER

## 2024-08-13 ASSESSMENT — PAIN SCALES - GENERAL: PAINLEVEL: EXTREME PAIN (8)

## 2024-08-13 NOTE — PATIENT INSTRUCTIONS
Try wearing compression hose.  Put them on in the morning when you arise and take them off before going to bed each night.    If symptoms persist follow-up with primary care or consider seeing orthopedics.

## 2024-08-13 NOTE — PROGRESS NOTES
Chief Complaint   Patient presents with    Urgent Care     Left ankle pain/swelling starting 1.5 months ago.          ICD-10-CM    1. Acute left ankle pain  M25.572 XR Ankle Left G/E 3 Views     ESR: Erythrocyte sedimentation rate     Uric acid     CBC with platelets and differential     ESR: Erythrocyte sedimentation rate     Uric acid     CBC with platelets and differential      2. Left ankle swelling  M25.472 XR Ankle Left G/E 3 Views     ESR: Erythrocyte sedimentation rate     Uric acid     CBC with platelets and differential     ESR: Erythrocyte sedimentation rate     Uric acid     CBC with platelets and differential     D dimer, quantitative      Suggested the use of compression stockings.  Good support shoes.  If symptoms persist may consider following up with orthopedics.      Results for orders placed or performed in visit on 08/13/24 (from the past 24 hour(s))   ESR: Erythrocyte sedimentation rate   Result Value Ref Range    Erythrocyte Sedimentation Rate 22 (H) 0 - 20 mm/hr   CBC with platelets and differential    Narrative    The following orders were created for panel order CBC with platelets and differential.  Procedure                               Abnormality         Status                     ---------                               -----------         ------                     CBC with platelets and d...[[id number]]  Abnormal            Final result                 Please view results for these tests on the individual orders.   CBC with platelets and differential   Result Value Ref Range    WBC Count 6.2 4.0 - 11.0 10e3/uL    RBC Count 4.46 3.80 - 5.20 10e6/uL    Hemoglobin 11.4 (L) 11.7 - 15.7 g/dL    Hematocrit 35.0 35.0 - 47.0 %    MCV 79 78 - 100 fL    MCH 25.6 (L) 26.5 - 33.0 pg    MCHC 32.6 31.5 - 36.5 g/dL    RDW 18.6 (H) 10.0 - 15.0 %    Platelet Count 267 150 - 450 10e3/uL    % Neutrophils 47 %    % Lymphocytes 36 %    % Monocytes 11 %    % Eosinophils 5 %    % Basophils 1 %    % Immature  Granulocytes 0 %    Absolute Neutrophils 2.9 1.6 - 8.3 10e3/uL    Absolute Lymphocytes 2.2 0.8 - 5.3 10e3/uL    Absolute Monocytes 0.7 0.0 - 1.3 10e3/uL    Absolute Eosinophils 0.3 0.0 - 0.7 10e3/uL    Absolute Basophils 0.0 0.0 - 0.2 10e3/uL    Absolute Immature Granulocytes 0.0 <=0.4 10e3/uL   D dimer, quantitative   Result Value Ref Range    D-Dimer Quantitative 0.38 0.00 - 0.50 ug/mL FEU    Narrative    This D-dimer assay is intended for use in conjunction with a clinical pretest probability assessment model to exclude pulmonary embolism (PE) and deep venous thrombosis (DVT) in outpatients suspected of PE or DVT. The cut-off value is 0.50 ug/mL FEU.     X-ray of left ankle shows no acute fractures or dislocations as read by this provider.    John Loya is an 29 year old female who presents to clinic today for pain and swelling in the left ankle for 1 and half months.  She denies any injury or trauma.  She denies have any symptoms at any point previously.  She does not work outside the home and is not on her feet all day.  The swelling is not better in the morning.  She denies any fever.      Objective    /69 (BP Location: Left arm, Patient Position: Sitting, Cuff Size: Adult Regular)   Pulse 74   Temp 99  F (37.2  C) (Tympanic)   Wt 85.7 kg (189 lb)   SpO2 98%   BMI 31.45 kg/m    Nurses notes and VS have been reviewed.    Physical Exam       GENERAL APPEARANCE: healthy appearing, alert     MS: extremities normal- no gross deformities noted; normal muscle tone, except for the left ankle which has nonpitting edema bilaterally, worse on the lateral malleolus, slight tenderness with palpation over the lateral malleolus, no erythema, limited range of motion due to pain     SKIN: no suspicious lesions or rashes     NEURO: Normal strength and tone, mentation intact and speech normal      Soraya Jiménez APRN, CNP  [facility]    The use of Dragon/[employer] dictation services  may have been used to construct the content in this note; any grammatical or spelling errors are non-intentional. Please contact the author of this note directly if you are in need of any clarification.

## 2024-09-22 ENCOUNTER — HOSPITAL ENCOUNTER (EMERGENCY)
Facility: CLINIC | Age: 29
Discharge: HOME | End: 2024-09-22
Attending: EMERGENCY MEDICINE | Admitting: EMERGENCY MEDICINE
Payer: COMMERCIAL

## 2024-09-22 ENCOUNTER — APPOINTMENT (OUTPATIENT)
Dept: GENERAL RADIOLOGY | Facility: CLINIC | Age: 29
End: 2024-09-22
Attending: EMERGENCY MEDICINE
Payer: COMMERCIAL

## 2024-09-22 VITALS
BODY MASS INDEX: 31.55 KG/M2 | HEIGHT: 65 IN | SYSTOLIC BLOOD PRESSURE: 92 MMHG | HEART RATE: 63 BPM | TEMPERATURE: 97.6 F | DIASTOLIC BLOOD PRESSURE: 63 MMHG | WEIGHT: 189.38 LBS | OXYGEN SATURATION: 98 % | RESPIRATION RATE: 16 BRPM

## 2024-09-22 DIAGNOSIS — M86.9 OSTEITIS PUBIS (H): ICD-10-CM

## 2024-09-22 DIAGNOSIS — M85.38 OSTEITIS CONDENSANS ILII: ICD-10-CM

## 2024-09-22 LAB
ANION GAP SERPL CALCULATED.3IONS-SCNC: 12 MMOL/L (ref 7–15)
BASOPHILS # BLD AUTO: 0 10E3/UL (ref 0–0.2)
BASOPHILS NFR BLD AUTO: 1 %
BUN SERPL-MCNC: 12.2 MG/DL (ref 6–20)
CALCIUM SERPL-MCNC: 8.6 MG/DL (ref 8.8–10.4)
CHLORIDE SERPL-SCNC: 102 MMOL/L (ref 98–107)
CK SERPL-CCNC: 57 U/L (ref 26–192)
CREAT SERPL-MCNC: 0.44 MG/DL (ref 0.51–0.95)
EGFRCR SERPLBLD CKD-EPI 2021: >90 ML/MIN/1.73M2
EOSINOPHIL # BLD AUTO: 0.4 10E3/UL (ref 0–0.7)
EOSINOPHIL NFR BLD AUTO: 5 %
ERYTHROCYTE [DISTWIDTH] IN BLOOD BY AUTOMATED COUNT: 18.1 % (ref 10–15)
GLUCOSE SERPL-MCNC: 92 MG/DL (ref 70–99)
HCG SERPL QL: NEGATIVE
HCO3 SERPL-SCNC: 22 MMOL/L (ref 22–29)
HCT VFR BLD AUTO: 36.1 % (ref 35–47)
HGB BLD-MCNC: 11.5 G/DL (ref 11.7–15.7)
IMM GRANULOCYTES # BLD: 0 10E3/UL
IMM GRANULOCYTES NFR BLD: 0 %
LYMPHOCYTES # BLD AUTO: 2.5 10E3/UL (ref 0.8–5.3)
LYMPHOCYTES NFR BLD AUTO: 35 %
MCH RBC QN AUTO: 25.8 PG (ref 26.5–33)
MCHC RBC AUTO-ENTMCNC: 31.9 G/DL (ref 31.5–36.5)
MCV RBC AUTO: 81 FL (ref 78–100)
MONOCYTES # BLD AUTO: 0.8 10E3/UL (ref 0–1.3)
MONOCYTES NFR BLD AUTO: 10 %
NEUTROPHILS # BLD AUTO: 3.6 10E3/UL (ref 1.6–8.3)
NEUTROPHILS NFR BLD AUTO: 49 %
NRBC # BLD AUTO: 0 10E3/UL
NRBC BLD AUTO-RTO: 0 /100
PLATELET # BLD AUTO: 227 10E3/UL (ref 150–450)
POTASSIUM SERPL-SCNC: 3.8 MMOL/L (ref 3.4–5.3)
RBC # BLD AUTO: 4.45 10E6/UL (ref 3.8–5.2)
SODIUM SERPL-SCNC: 136 MMOL/L (ref 135–145)
WBC # BLD AUTO: 7.3 10E3/UL (ref 4–11)

## 2024-09-22 PROCEDURE — 84703 CHORIONIC GONADOTROPIN ASSAY: CPT | Performed by: EMERGENCY MEDICINE

## 2024-09-22 PROCEDURE — 82550 ASSAY OF CK (CPK): CPT | Performed by: EMERGENCY MEDICINE

## 2024-09-22 PROCEDURE — 250N000011 HC RX IP 250 OP 636: Performed by: EMERGENCY MEDICINE

## 2024-09-22 PROCEDURE — 36415 COLL VENOUS BLD VENIPUNCTURE: CPT | Performed by: EMERGENCY MEDICINE

## 2024-09-22 PROCEDURE — 85025 COMPLETE CBC W/AUTO DIFF WBC: CPT | Performed by: EMERGENCY MEDICINE

## 2024-09-22 PROCEDURE — 250N000013 HC RX MED GY IP 250 OP 250 PS 637: Performed by: EMERGENCY MEDICINE

## 2024-09-22 PROCEDURE — 73522 X-RAY EXAM HIPS BI 3-4 VIEWS: CPT

## 2024-09-22 PROCEDURE — 80048 BASIC METABOLIC PNL TOTAL CA: CPT | Performed by: EMERGENCY MEDICINE

## 2024-09-22 PROCEDURE — 96374 THER/PROPH/DIAG INJ IV PUSH: CPT

## 2024-09-22 PROCEDURE — 99284 EMERGENCY DEPT VISIT MOD MDM: CPT | Mod: 25

## 2024-09-22 PROCEDURE — 72100 X-RAY EXAM L-S SPINE 2/3 VWS: CPT

## 2024-09-22 RX ORDER — KETOROLAC TROMETHAMINE 15 MG/ML
15 INJECTION, SOLUTION INTRAMUSCULAR; INTRAVENOUS ONCE
Status: COMPLETED | OUTPATIENT
Start: 2024-09-22 | End: 2024-09-22

## 2024-09-22 RX ORDER — ACETAMINOPHEN 500 MG
1000 TABLET ORAL ONCE
Status: COMPLETED | OUTPATIENT
Start: 2024-09-22 | End: 2024-09-22

## 2024-09-22 RX ADMIN — ACETAMINOPHEN 1000 MG: 500 TABLET, FILM COATED ORAL at 19:06

## 2024-09-22 RX ADMIN — KETOROLAC TROMETHAMINE 15 MG: 15 INJECTION, SOLUTION INTRAMUSCULAR; INTRAVENOUS at 19:06

## 2024-09-22 ASSESSMENT — ACTIVITIES OF DAILY LIVING (ADL)
ADLS_ACUITY_SCORE: 33
ADLS_ACUITY_SCORE: 35
ADLS_ACUITY_SCORE: 33
ADLS_ACUITY_SCORE: 35

## 2024-09-22 ASSESSMENT — COLUMBIA-SUICIDE SEVERITY RATING SCALE - C-SSRS
2. HAVE YOU ACTUALLY HAD ANY THOUGHTS OF KILLING YOURSELF IN THE PAST MONTH?: NO
6. HAVE YOU EVER DONE ANYTHING, STARTED TO DO ANYTHING, OR PREPARED TO DO ANYTHING TO END YOUR LIFE?: NO
1. IN THE PAST MONTH, HAVE YOU WISHED YOU WERE DEAD OR WISHED YOU COULD GO TO SLEEP AND NOT WAKE UP?: NO

## 2024-09-22 NOTE — ED TRIAGE NOTES
Pt comes in with leg and hip pain for a full year since the birth of her last child.  Pt states the pain has gotten worse and wishes to be assessed.  No trauma.  Pt states she is not pregnant currently     Triage Assessment (Adult)       Row Name 09/22/24 9256          Triage Assessment    Airway WDL WDL        Respiratory WDL    Respiratory WDL WDL        Cardiac WDL    Cardiac WDL WDL

## 2024-09-23 NOTE — DISCHARGE INSTRUCTIONS
Discharge Instructions  Back Pain  You were seen today for back pain. Back pain can have many causes, but most will get better without surgery or other specific treatment. Sometimes there is a herniated ( slipped ) disc. We do not usually do MRI scans to look for these right away, since most herniated discs will get better on their own with time.  Today, we did not find any evidence that your back pain was caused by a serious condition. However, sometimes symptoms develop over time and cannot be found during an emergency visit, so it is very important that you follow up with your primary provider.  Generally, every Emergency Department visit should have a follow-up clinic visit with either a primary or a specialty clinic/provider. Please follow-up as instructed by your emergency provider today.    Return to the Emergency Department if:  You develop a fever with your back pain.   You have weakness or change in sensation in one or both legs.  You lose control of your bowels or bladder, or cannot empty your bladder (cannot pee).  Your pain gets much worse.     Follow-up with your provider:  Unless your pain has completely gone away, please make an appointment with your provider within one week. Most of the routine care for back pain is available in a clinic and not the Emergency Department. You may need further management of your back pain, such as more pain medication, imaging such as an X-ray or MRI, or physical therapy.    What can I do to help myself?  Remain Active -- People are often afraid that they will hurt their back further or delay recovery by remaining active, but this is one of the best things you can do for your back. In fact, staying in bed for a long time to rest is not recommended. Studies have shown that people with low back pain recover faster when they remain active. Movement helps to bring blood flow to the muscles and relieve muscle spasms as well as preventing loss of muscle strength.  Heat --  Spoke with daughter and she will check with aide on the best time to bring patient in.  Offered Friday 9/22/23 at 9:20   Using a heating pad can help with low back pain during the first few weeks. Do not sleep with a heating pad, as you can be burned.   Pain medications - You may take a pain medication such as Tylenol  (acetaminophen), Advil , Motrin  (ibuprofen) or Aleve  (naproxen).  If you were given a prescription for medicine here today, be sure to read all of the information (including the package insert) that comes with your prescription.  This will include important information about the medicine, its side effects, and any warnings that you need to know about.  The pharmacist who fills the prescription can provide more information and answer questions you may have about the medicine.  If you have questions or concerns that the pharmacist cannot address, please call or return to the Emergency Department.   Remember that you can always come back to the Emergency Department if you are not able to see your regular provider in the amount of time listed above, if you get any new symptoms, or if there is anything that worries you.    Discharge Instructions  Extremity Injury    You were seen today for an injury to an extremity (arm, hand, leg, or foot). You may have a bruise, strain, or fracture (broken bone).    Generally, every Emergency Department visit should have a follow-up clinic visit with either a primary or a specialty clinic/provider. Please follow-up as instructed by your emergency provider today.  Return to the Emergency Department right away if:  Your pain seems to change or get worse or there is pain in a new area that wasn t evaluated today.  Your extremity becomes pale, cool, blue, or numb or tingling past the injury.  You have more drainage, redness or pain in the area of the cut or abrasion.  You have pain that you cannot control with the medicine recommended or prescribed here, or you have pain that seems too much for your injury.  Your child (who is injured) will not stop crying or is much more fussy than  normal.  You have new symptoms or anything that worries you.    What to Expect:  Your swelling and pain may be worse the day after your injury, but should not be severe and should start getting better after that. You should not have new symptoms and your pain should not get worse.  You may start to get a bruise over the injured area or below the injured area (bruising can follow gravity).  Your movement and strength should get better with time.  Some injuries may not show up until after you have left the Emergency Department so it is important to follow-up as directed.  Your injury may prevent you from working.  Follow-up with your regular provider to get a work release note.  Pain medications or your injury may make it unsafe to drive or operate machinery.    Home Care:  RICE: Rest, Ice, Compression, Elevation  Rest: Rest your injured area for at least 1-2 days. After that you may start using your extremity again as long as there is not too much pain.   Ice: Apply ice your injured area for 15 minutes at a time, at least 3 times a day. Use a cloth between the ice bag and your skin to prevent frostbite. Do not sleep with an ice pack or heating pad on, since this can cause burns or skin injury.  Compression: You may use an elastic bandage (Ace  Wrap) if it makes you more comfortable. Wrap it just tight enough to provide light compression, like a new pair of socks feels. Loosen the bandage if you have swelling past the bandage.  Elevation: Raise the injured area above the level of your heart as much as possible in the first 1-2 days.    Use Tylenol  (acetaminophen), Motrin (ibuprofen), or Advil  (ibuprofen) for your pain unless you have an allergy or are told not to use these medications by your provider.  Take the medications as instructed on the package. Tylenol  (acetaminophen) is in many prescription medicines and non-prescription medicines--check all of your medicines to be sure you aren t taking more than 3000 mg  per day.  Please follow any other instructions that were discussed with you by your provider.    Stretching/Exercises:  You may have been provided with instructions for stretching or exercises. If your injury was to your arm or shoulder and your provider put you in a sling or an immobilizer, it is important that you take off your immobilizer within 3 days and stretch/move your shoulder, unless your provider specifically tells you to not move your shoulder.  This is to prevent further injury such as a  frozen shoulder .     If you were given a prescription for medicine here today, be sure to read all of the information (including the package insert) that comes with your prescription.  This will include important information about the medicine, its side effects, and any warnings that you need to know about.  The pharmacist who fills the prescription can provide more information and answer questions you may have about the medicine.  If you have questions or concerns that the pharmacist cannot address, please call or return to the Emergency Department.     Remember that you can always come back to the Emergency Department if you are not able to see your regular provider in the amount of time listed above, if you get any new symptoms, or if there is anything that worries you.

## 2024-09-23 NOTE — ED PROVIDER NOTES
"  Emergency Department Note      History of Present Illness     Chief Complaint   Leg Pain      HPI   Karen Loya is a 29 year old female who presents for evaluation of low back and groin pain.  Symptoms actually been present for over 1 year since she had a baby.  Pain is located in her lower back and wraps around her hips into her groins.  The pains go down both her legs.  She has no new trauma.  No weakness or numbness in her legs.  No bladder or bowel incontinence.  No upper extremity symptoms.  No fevers or chills.  No abdominal pain.    Independent Historian   None    Review of External Notes       Past Medical History     Medical History and Problem List   Past Medical History:   Diagnosis Date    Iron deficiency anemia        Medications   acetaminophen (TYLENOL) 325 MG tablet  docusate sodium (COLACE) 100 MG capsule  ferrous gluconate (FERGON) 324 (38 Fe) MG tablet  ibuprofen (ADVIL/MOTRIN) 800 MG tablet  Prenatal Vit-Fe Fumarate-FA (PRENATAL MULTIVITAMIN W/IRON) 27-0.8 MG tablet        Surgical History   No past surgical history on file.    Physical Exam     Patient Vitals for the past 24 hrs:   BP Temp Temp src Pulse Resp SpO2 Height Weight   09/22/24 2306 92/63 97.6  F (36.4  C) Oral 63 16 98 % -- --   09/22/24 1717 102/62 97.7  F (36.5  C) Temporal 73 16 98 % 1.651 m (5' 5\") 85.9 kg (189 lb 6 oz)     Physical Exam  Constitutional: Well appearing.  HEENT: Atraumatic.  Moist mucous membranes.  Neck: Soft.  Supple.  Cardiac: Regular rate and rhythm.  No murmur or rub.  Respiratory: Clear to auscultation bilaterally.  No respiratory distress.  No wheezing, rhonchi, or rales.  Abdomen: Soft and nontender.  No guarding.  Nondistended.  Musculoskeletal: No edema.  Normal range of motion.  Neurologic: Alert and oriented x3.  Normal tone and bulk.  5/5 strength in bilateral upper and lower extremities.  Sensation to light touch intact throughout.  Normal gait.  Skin: No rashes.  No edema.  Psych: Normal " affect.  Normal behavior.              Diagnostics     Lab Results   Labs Ordered and Resulted from Time of ED Arrival to Time of ED Departure   BASIC METABOLIC PANEL - Abnormal       Result Value    Sodium 136      Potassium 3.8      Chloride 102      Carbon Dioxide (CO2) 22      Anion Gap 12      Urea Nitrogen 12.2      Creatinine 0.44 (*)     GFR Estimate >90      Calcium 8.6 (*)     Glucose 92     CBC WITH PLATELETS AND DIFFERENTIAL - Abnormal    WBC Count 7.3      RBC Count 4.45      Hemoglobin 11.5 (*)     Hematocrit 36.1      MCV 81      MCH 25.8 (*)     MCHC 31.9      RDW 18.1 (*)     Platelet Count 227      % Neutrophils 49      % Lymphocytes 35      % Monocytes 10      % Eosinophils 5      % Basophils 1      % Immature Granulocytes 0      NRBCs per 100 WBC 0      Absolute Neutrophils 3.6      Absolute Lymphocytes 2.5      Absolute Monocytes 0.8      Absolute Eosinophils 0.4      Absolute Basophils 0.0      Absolute Immature Granulocytes 0.0      Absolute NRBCs 0.0     CK TOTAL - Normal    CK 57     HCG QUALITATIVE PREGNANCY - Normal    hCG Serum Qualitative Negative         Imaging   XR Pelvis and Hip Bilateral 2 Views   Final Result   IMPRESSION: Mild widening of the pubic symphysis and sclerosis in the pubic bone bilaterally, 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 There is no evidence    of fracture or osteonecrosis.      Lumbar spine XR, 2-3 views   Final Result   IMPRESSION: No fracture. Normal vertebral heights and alignment. Normal disc spaces and facets for age. Pedicles are intact. Sacrum and sacroiliac joints are without acute fracture. There is some sclerosis around both SI joints. Normal extraspinal    structures.            Independent Interpretation   None    ED Course      Medications Administered   Medications   acetaminophen (TYLENOL) tablet 1,000 mg (1,000 mg Oral $Given 9/22/24 6900)    ketorolac (TORADOL) injection 15 mg (15 mg Intravenous $Given 9/22/24 8818)       Procedures   Procedures     Discussion of Management   None    ED Course        Additional Documentation  None    Medical Decision Making / Diagnosis     CMS Diagnoses: None    MIPS       None    MDM   Karen Loya is a 29 year old female who is afebrile and hemodynamically stable.  Neurovascular intact in lower extremities with no evidence of acute emergent condition such as cauda equina syndrome, epidural abscess, or other that require emergent imaging such as an MRI.  X-ray demonstrated chronic SI dysfunction and pubic symphysis dysfunction.  Discussed results with her and that certainly could explain her symptoms.  She has no fever or infectious symptoms to suggest infection and this appears chronic.  Lab workup is unrevealing.  We discussed plan for discharge home with close orthopedic and primary care follow-up and she is in agreement understanding.  Strict return precautions were given.  No emergent life-threatening condition today found that would necessitate hospitalization.  Her questions were answered and she was in no distress at time of discharge.    Disposition   The patient was discharged.     Diagnosis     ICD-10-CM    1. Osteitis pubis (H)  M86.9       2. 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

## 2024-09-23 NOTE — ED NOTES
Pt discharged home with AVS. Pt looks well, agreeable to plan of care and pt PIV removed. Left amb with good gait to exit. No concerns. Cheerful disposition.